# Patient Record
Sex: MALE | Race: BLACK OR AFRICAN AMERICAN | NOT HISPANIC OR LATINO | Employment: FULL TIME | URBAN - METROPOLITAN AREA
[De-identification: names, ages, dates, MRNs, and addresses within clinical notes are randomized per-mention and may not be internally consistent; named-entity substitution may affect disease eponyms.]

---

## 2020-08-22 ENCOUNTER — HOSPITAL ENCOUNTER (EMERGENCY)
Facility: CLINIC | Age: 35
Discharge: HOME | End: 2020-08-22
Attending: NURSE PRACTITIONER | Admitting: NURSE PRACTITIONER

## 2020-08-22 VITALS
WEIGHT: 190 LBS | OXYGEN SATURATION: 98 % | TEMPERATURE: 99.5 F | SYSTOLIC BLOOD PRESSURE: 134 MMHG | BODY MASS INDEX: 26.6 KG/M2 | RESPIRATION RATE: 16 BRPM | HEIGHT: 71 IN | HEART RATE: 79 BPM | DIASTOLIC BLOOD PRESSURE: 98 MMHG

## 2020-08-22 DIAGNOSIS — M54.41 RIGHT-SIDED LOW BACK PAIN WITH RIGHT-SIDED SCIATICA: ICD-10-CM

## 2020-08-22 DIAGNOSIS — M79.604 PAIN OF RIGHT LEG: ICD-10-CM

## 2020-08-22 PROCEDURE — 99283 EMERGENCY DEPT VISIT LOW MDM: CPT

## 2020-08-22 RX ORDER — PREDNISONE 20 MG/1
60 TABLET ORAL DAILY
Qty: 15 TABLET | Refills: 0 | Status: SHIPPED | OUTPATIENT
Start: 2020-08-22 | End: 2020-08-27

## 2020-08-22 ASSESSMENT — MIFFLIN-ST. JEOR: SCORE: 1823.96

## 2020-08-22 ASSESSMENT — ENCOUNTER SYMPTOMS
FLANK PAIN: 1
NUMBNESS: 1
MYALGIAS: 1
ARTHRALGIAS: 1
WEAKNESS: 1
FEVER: 0
CHILLS: 0

## 2020-08-22 NOTE — ED AVS SNAPSHOT
Emergency Department  [address] [place] [address]  Phone:  [phone]  Fax:  [phone]                                    Blanca Zee   MRN: [id number]    Department:   Emergency Department   Date of Visit:  8/22/2020           After Visit Summary Signature Page    I have received my discharge instructions, and my questions have been answered. I have discussed any challenges I see with this plan with the nurse or doctor.    ..........................................................................................................................................  Patient/Patient Representative Signature      ..........................................................................................................................................  Patient Representative Print Name and Relationship to Patient    ..................................................               ................................................  Date                                   Time    ..........................................................................................................................................  Reviewed by Signature/Title    ...................................................              ..............................................  Date                                               Time          22EPIC Rev 08/18

## 2020-08-22 NOTE — ED PROVIDER NOTES
"  History     Chief Complaint:  Leg Pain    HPI   Blanca Zee is a 35 year old male who presents with right sided hip pain radiating to his right groin and leg for the past five days. He endorses associated leg weakness and numbness in the toes. He says pain medication temporarily helps for six hours. His temperature in triage was 99.5 F but was 98.4 F upon evaluation. He denies any fever, incontinence, or chills.    Allergies:  NKDA      Medications:    The patient is not currently taking any prescribed medications.    Past Medical History:    History reviewed.  No pertinent past medical history.    Past Surgical History:    The patient does not have any pertinent past surgical history.     Family History:    History reviewed. No pertinent family history.     Social History:  Smoking status: no  Smokeless tobacco: no  Alcohol use: no  Drug use: no  Marital Status:  Single [1]     Review of Systems   Constitutional: Negative for chills and fever.   Genitourinary: Positive for flank pain.   Musculoskeletal: Positive for arthralgias and myalgias.   Neurological: Positive for weakness and numbness.   All other systems reviewed and are negative.      Physical Exam     Patient Vitals for the past 24 hrs:   BP Temp Temp src Pulse Resp SpO2 Height Weight   08/22/20 1324 (!) 134/98 99.5  F (37.5  C) Oral 79 16 98 % 1.803 m (5' 11\") 86.2 kg (190 lb)       Physical Exam  Physical Exam   Constitutional: Pt appears well-developed and well-nourished. Non toxic appearing.   Head: Head moves freely with normal range of motion.   ENT: Oropharynx is clear and moist.   Eyes: Conjunctivae pink. EOMs intact.   Neck: Normal range of motion.    Cardiovascular: Regular rate and rhythm. Normal heart sounds. No concerning murmur. Intact distal pulses: radial pulses 2+ on the right, 2+ on the left. Pedal pulses 2+ on the right, 2+ on the left.   Pulmonary/Chest: No respiratory distress.  Breath sounds normal.   Abdominal: Soft. Non-tender. " No rebound, no guarding. No CVA tenderness.  Musculoskeletal: Distal capillary refill and sensation intact. Lower extremity strength 5/5. Patellar and achilles reflexes 2+. positive straight leg raise on the right and no crossover.  No lumbar spinal erythema, edema or heat to touch. Tender to palpation over the right sciatic notch.   Neurological: Oriented to person, place, and time. No focal deficits. No saddle anesthesia.   Skin: Skin is warm.     Emergency Department Course   Emergency Department Course:  Past medical records, nursing notes, and vitals reviewed.  1351: I performed an exam of the patient and obtained history, as documented above.     I reviewed the results with the Patient and answered all related questions prior to discharge.     Findings and plan explained to the Patient. Patient discharged home with instructions regarding supportive care, medications, and reasons to return. The importance of close follow-up was reviewed.   Impression & Plan     Medical Decision Making:  Blanca Zee is a 35 year old male with right lower back pain radiating down his leg. No urinary symptoms, no CVA tenderness, this is unlikely pain related to UTI, pyelonephritis or renal colic/stone. Pt has radiating pain with right SLR, no bladder or bowel dysfunction, normal reflexes, with normal motor strength and function of lower extremities. No saddle anesthesia. Pt is afebrile and non-toxic appearing with no bony tenderness, erythema or heat to touch. These findings do not correlate with diskitis, cord compression, cauda equina syndrome or epidural abscess. Symptoms and exam consistent with right sided lumbar radiculopathy.  We discussed stress steroids, reasons to return here and follow up with [facility] if not improved. He is amenable to plan.     Diagnosis:    ICD-10-CM   1. Right-sided low back pain with right-sided sciatica  M54.41   2. Pain of right leg  M79.604     Disposition:  Discharged to home.    Discharge  Medications:  New Prescriptions    PREDNISONE (DELTASONE) 20 MG TABLET    Take 3 tablets (60 mg) by mouth daily for 5 days     Li Shaw  8/22/2020    EMERGENCY DEPARTMENT    Scribe Disclosure:  I, Li Shaw, am serving as a scribe at 1:46 PM on 8/22/2020 to document services personally performed by Jasmine Ocampo CNP based on my observations and the provider's statements to me.          Jasmine Ocampo, BRIANNA CNP  08/22/20 [id number]

## 2020-08-22 NOTE — DISCHARGE INSTRUCTIONS
Take your first dose of Prednisone 60mg (3 tabs) as soon as you get it filled today. Take your next dose tomorrow morning and once a day in the mornings. You will be on this for 5 days.

## 2020-08-23 ENCOUNTER — HOSPITAL ENCOUNTER (EMERGENCY)
Facility: CLINIC | Age: 35
Discharge: HOME | End: 2020-08-23
Attending: EMERGENCY MEDICINE | Admitting: EMERGENCY MEDICINE

## 2020-08-23 VITALS
RESPIRATION RATE: 18 BRPM | DIASTOLIC BLOOD PRESSURE: 92 MMHG | SYSTOLIC BLOOD PRESSURE: 139 MMHG | OXYGEN SATURATION: 98 % | TEMPERATURE: 97.8 F | HEART RATE: 111 BPM

## 2020-08-23 DIAGNOSIS — M54.41 ACUTE RIGHT-SIDED LOW BACK PAIN WITH RIGHT-SIDED SCIATICA: ICD-10-CM

## 2020-08-23 PROCEDURE — 99283 EMERGENCY DEPT VISIT LOW MDM: CPT

## 2020-08-23 PROCEDURE — 25000132 ZZH RX MED GY IP 250 OP 250 PS 637: Performed by: EMERGENCY MEDICINE

## 2020-08-23 RX ORDER — CYCLOBENZAPRINE HCL 10 MG
10 TABLET ORAL ONCE
Status: COMPLETED | OUTPATIENT
Start: 2020-08-23 | End: 2020-08-23

## 2020-08-23 RX ORDER — LIDOCAINE 4 G/G
1 PATCH TOPICAL ONCE
Status: DISCONTINUED | OUTPATIENT
Start: 2020-08-23 | End: 2020-08-23 | Stop reason: HOSPADM

## 2020-08-23 RX ORDER — HYDROCODONE BITARTRATE AND ACETAMINOPHEN 5; 325 MG/1; MG/1
1-2 TABLET ORAL EVERY 4 HOURS PRN
Qty: 15 TABLET | Refills: 0 | Status: SHIPPED | OUTPATIENT
Start: 2020-08-23 | End: 2021-03-01

## 2020-08-23 RX ORDER — CYCLOBENZAPRINE HCL 10 MG
10 TABLET ORAL 3 TIMES DAILY PRN
Qty: 20 TABLET | Refills: 0 | Status: SHIPPED | OUTPATIENT
Start: 2020-08-23 | End: 2020-08-30

## 2020-08-23 RX ORDER — HYDROCODONE BITARTRATE AND ACETAMINOPHEN 5; 325 MG/1; MG/1
2 TABLET ORAL ONCE
Status: COMPLETED | OUTPATIENT
Start: 2020-08-23 | End: 2020-08-23

## 2020-08-23 RX ADMIN — HYDROCODONE BITARTRATE AND ACETAMINOPHEN 2 TABLET: 5; 325 TABLET ORAL at 12:03

## 2020-08-23 RX ADMIN — LIDOCAINE 1 PATCH: 560 PATCH PERCUTANEOUS; TOPICAL; TRANSDERMAL at 12:03

## 2020-08-23 RX ADMIN — CYCLOBENZAPRINE 10 MG: 10 TABLET, FILM COATED ORAL at 12:03

## 2020-08-23 ASSESSMENT — ENCOUNTER SYMPTOMS
FEVER: 0
MYALGIAS: 1
BACK PAIN: 1

## 2020-08-23 NOTE — ED AVS SNAPSHOT
Emergency Department  [address] [place] [address]  Phone:  [phone]  Fax:  [phone]                                    Blanca Zee   MRN: [id number]    Department:   Emergency Department   Date of Visit:  8/23/2020           After Visit Summary Signature Page    I have received my discharge instructions, and my questions have been answered. I have discussed any challenges I see with this plan with the nurse or doctor.    ..........................................................................................................................................  Patient/Patient Representative Signature      ..........................................................................................................................................  Patient Representative Print Name and Relationship to Patient    ..................................................               ................................................  Date                                   Time    ..........................................................................................................................................  Reviewed by Signature/Title    ...................................................              ..............................................  Date                                               Time          22EPIC Rev 08/18

## 2020-08-23 NOTE — DISCHARGE INSTRUCTIONS
You should also use ibuprofen and Tylenol when you are not taking the Norco that you are prescribed, as that has Tylenol in it.  You should also use over-the-counter Lidoderm patches.  You are also being provided the contact information for a spine specialist at [facility] as well as the contact information for a family practice clinic.

## 2020-08-23 NOTE — ED PROVIDER NOTES
History     Chief Complaint:  Back Pain     HPI   Blanca Zee is a 35 year old male who presents with his friend/roommate to the ED for evaluation of right lower back pain. On review of patient s record, he was seen at [facility] ED yesterday for right lower back pain radiating down into his groin and leg for the past 5 days. Patient was discharged home with a 5-day course of 20mg Prednisone. Upon evaluation, the patient notes the pain has worsened. Standing and ambulating worsens the pain. He took Prednisone without alleviation of pain. He has been taking Ibuprofen and Tylenol as well. The patient denies any fever, urinary/bowel incontinence, or other symptoms/complaints.       Allergies:  No known drug allergies    Medications:    Prednisone     Past Medical History:    The patient does not have any past pertinent medical history.    Past Surgical History:    History reviewed. No pertinent surgical history.    Family History:    History reviewed. No pertinent family history.     Social History:  Smoking status: Never smoker    Substance use: Not currently   Alcohol use: Not currently  Presents to ED with friend/roommate    Marital Status:  Single [1]     Review of Systems   Constitutional: Negative for fever.   Musculoskeletal: Positive for back pain and myalgias.   All other systems reviewed and are negative.    Physical Exam     Patient Vitals for the past 24 hrs:   BP Temp Temp src Pulse Resp SpO2   08/23/20 1109 (!) 139/92 97.8  F (36.6  C) Temporal 111 18 98 %     Physical Exam  Nursing note and vitals reviewed.  Constitutional:  Oriented to person, place, and time. Cooperative. Appears uncomfortable.   HENT:   Nose:    Nose normal.   Mouth/Throat:   Mucous membranes are normal.   Eyes:    Conjunctivae normal and EOM are normal.      Pupils are equal, round, and reactive to light.   Neck:    Trachea normal.   Cardiovascular:  Normal rate, regular rhythm, normal heart sounds and normal pulses. No murmur  heard.  Pulmonary/Chest:  Effort normal and breath sounds normal.   Abdominal:   Soft. Normal appearance and bowel sounds are normal.      There is no tenderness.      There is no rebound and no CVA tenderness.   Musculoskeletal:  Extremities atraumatic x 4. Tenderness to palpation in the right low back region.  Lymphadenopathy:  No cervical adenopathy.   Neurological:   Alert and oriented to person, place, and time. Normal strength.      No cranial nerve deficit or sensory deficit. GCS eye subscore is 4. GCS verbal subscore is 5. GCS motor subscore is 6. 5/5 strength in all muscle groups of the lower extremities. DTRs equal and symmetric in the lower extremities. Subjective decreased sensation to light touch to right lower leg. Straight leg raise reproduces pain.   Skin:    Skin is intact. No rash noted.   Psychiatric:   Normal mood and affect.    Emergency Department Course     Interventions:  1203: Lidocaine 4% 1 patch Transdermal  1203: Norco 5-325mg 2 tablets PO  1203: Flexeril 10mg PO    Emergency Department Course:  Past medical records, nursing notes, and vitals reviewed.    1129: I performed an exam of the patient as documented above.     1203: Patient was provided medications as noted above.    Findings and plan explained to the patient. Patient discharged home with instructions regarding supportive care, medications, and reasons to return. The importance of close follow-up was reviewed. The patient was prescribed Norco and Flexeril.     I personally reviewed the laboratory results with the patient and answered all related questions prior to discharge.     Impression & Plan      Medical Decision Making:  This is a 35-year-old male who came in for further evaluation of ongoing and somewhat worsening radicular pain in the right low back region.  He does not have any worrisome symptoms or exam findings for cauda equina syndrome or discitis, and therefore I do not feel that he warrants emergent imaging with CT  or MRI at this time.  However he clearly is quite uncomfortable.  Therefore he was provided the above interventions here, and I will send him home with prescriptions for Flexeril and Norco as well.  I also recommended he buy some over-the-counter Lidoderm patches.  He has been provided follow-up with [facility] again, as he did not realize that was provided to him yesterday.  He should return with any concerns or worsening symptoms as well.    Diagnosis:    ICD-10-CM   1. Acute right-sided low back pain with right-sided sciatica  M54.41     Disposition: Patient discharged to home with friend/roommate     Discharge Medications:   Details   cyclobenzaprine (FLEXERIL) 10 MG tablet Take 1 tablet (10 mg) by mouth 3 times daily as needed for muscle spasms, Disp-20 tablet,R-0, Local Print      HYDROcodone-acetaminophen (NORCO) 5-325 MG tablet Take 1-2 tablets by mouth every 4 hours as needed for pain, Disp-15 tablet,R-0, Local Print     Anastacia Mccormick  8/23/2020    EMERGENCY DEPARTMENT    Scribe Disclosure:  I, Anastacia Mccormick, am serving as a scribe at 11:29 AM on 8/23/2020 to document services personally performed by Alton Nesbitt MD based on my observations and the provider's statements to me.        Alton Nesbitt MD  08/23/20 [id number]

## 2021-03-01 ENCOUNTER — OFFICE VISIT (OUTPATIENT)
Dept: FAMILY MEDICINE | Facility: CLINIC | Age: 36
End: 2021-03-01
Payer: COMMERCIAL

## 2021-03-01 VITALS
HEART RATE: 90 BPM | WEIGHT: 195 LBS | BODY MASS INDEX: 27.3 KG/M2 | SYSTOLIC BLOOD PRESSURE: 122 MMHG | TEMPERATURE: 97.2 F | DIASTOLIC BLOOD PRESSURE: 78 MMHG | OXYGEN SATURATION: 99 % | HEIGHT: 71 IN

## 2021-03-01 DIAGNOSIS — Z23 NEED FOR PROPHYLACTIC VACCINATION AND INOCULATION AGAINST INFLUENZA: ICD-10-CM

## 2021-03-01 DIAGNOSIS — R74.8 ELEVATED ALKALINE PHOSPHATASE LEVEL: ICD-10-CM

## 2021-03-01 DIAGNOSIS — Z13.220 SCREENING FOR LIPID DISORDERS: ICD-10-CM

## 2021-03-01 DIAGNOSIS — Z13.1 SCREENING FOR DIABETES MELLITUS: ICD-10-CM

## 2021-03-01 DIAGNOSIS — Z00.00 ENCOUNTER FOR ROUTINE ADULT HEALTH EXAMINATION WITHOUT ABNORMAL FINDINGS: Primary | ICD-10-CM

## 2021-03-01 DIAGNOSIS — R73.01 ELEVATED FASTING GLUCOSE: ICD-10-CM

## 2021-03-01 PROCEDURE — 90471 IMMUNIZATION ADMIN: CPT | Performed by: NURSE PRACTITIONER

## 2021-03-01 PROCEDURE — 36415 COLL VENOUS BLD VENIPUNCTURE: CPT | Performed by: NURSE PRACTITIONER

## 2021-03-01 PROCEDURE — 99385 PREV VISIT NEW AGE 18-39: CPT | Mod: 25 | Performed by: NURSE PRACTITIONER

## 2021-03-01 PROCEDURE — 90686 IIV4 VACC NO PRSV 0.5 ML IM: CPT | Performed by: NURSE PRACTITIONER

## 2021-03-01 PROCEDURE — 80053 COMPREHEN METABOLIC PANEL: CPT | Performed by: NURSE PRACTITIONER

## 2021-03-01 PROCEDURE — 80061 LIPID PANEL: CPT | Performed by: NURSE PRACTITIONER

## 2021-03-01 ASSESSMENT — MIFFLIN-ST. JEOR: SCORE: 1841.64

## 2021-03-01 NOTE — PROGRESS NOTES
SUBJECTIVE:   CC: Blanca Zee is an 36 year old male who presents for preventative health visit.       Patient has been advised of split billing requirements and indicates understanding: Yes  Healthy Habits:    Getting at least 3 servings of Calcium per day:  Yes    Bi-annual eye exam:  NO    Dental care twice a year:  Yes    Sleep apnea or symptoms of sleep apnea:  None    Diet:  Regular (no restrictions)    Frequency of exercise:  2-3 days/week    Duration of exercise:  30-45 minutes    Taking medications regularly:  Not Applicable    Barriers to taking medications:  Not applicable    Medication side effects:  Not applicable    PHQ-2 Total Score:    Additional concerns today:  No      Today's PHQ-2 Score:   PHQ-2 ( 1999 Pfizer) 3/1/2021   Q1: Little interest or pleasure in doing things 0   Q2: Feeling down, depressed or hopeless 0   PHQ-2 Score 0       Abuse: Current or Past(Physical, Sexual or Emotional)- No  Do you feel safe in your environment? Yes    Have you ever done Advance Care Planning? (For example, a Health Directive, POLST, or a discussion with a medical provider or your loved ones about your wishes): No, advance care planning information given to patient to review.  Patient declined advance care planning discussion at this time.    Social History     Tobacco Use     Smoking status: Never Smoker     Smokeless tobacco: Never Used   Substance Use Topics     Alcohol use: Not Currently     If you drink alcohol do you typically have >3 drinks per day or >7 drinks per week? No    Alcohol Use 3/1/2021   Prescreen: >3 drinks/day or >7 drinks/week? No       Last PSA: No results found for: PSA    Reviewed orders with patient. Reviewed health maintenance and updated orders accordingly - Yes  Lab work is in process    Reviewed and updated as needed this visit by clinical staff  Tobacco  Allergies  Meds  Problems  Med Hx  Surg Hx  Fam Hx          Reviewed and updated as needed this visit by  "Provider  Tobacco  Allergies  Meds  Problems  Med Hx  Surg Hx  Fam Hx             Review of Systems  CONSTITUTIONAL: NEGATIVE for fever, chills, change in weight  INTEGUMENTARY/SKIN: NEGATIVE for worrisome rashes, moles or lesions  EYES: NEGATIVE for vision changes or irritation  ENT: NEGATIVE for ear, mouth and throat problems  RESP: NEGATIVE for significant cough or SOB  CV: NEGATIVE for chest pain, palpitations or peripheral edema  GI: NEGATIVE for nausea, abdominal pain, heartburn, or change in bowel habits   male: negative for dysuria, hematuria, decreased urinary stream, erectile dysfunction, urethral discharge  MUSCULOSKELETAL: NEGATIVE for significant arthralgias or myalgia  NEURO: NEGATIVE for weakness, dizziness or paresthesias  PSYCHIATRIC: NEGATIVE for changes in mood or affect    OBJECTIVE:   /78 (BP Location: Right arm, Cuff Size: Adult Regular)   Pulse 90   Temp 97.2  F (36.2  C) (Tympanic)   Ht 1.803 m (5' 11\")   Wt 88.5 kg (195 lb)   SpO2 99%   BMI 27.20 kg/m      Physical Exam  GENERAL: healthy, alert and no distress  EYES: Eyes grossly normal to inspection, PERRL and conjunctivae and sclerae normal  HENT: ear canals and TM's normal, nose and mouth without ulcers or lesions  NECK: no adenopathy, no asymmetry, masses, or scars and thyroid normal to palpation  RESP: lungs clear to auscultation - no rales, rhonchi or wheezes  CV: regular rate and rhythm, normal S1 S2, no S3 or S4, no murmur, click or rub, no peripheral edema and peripheral pulses strong  ABDOMEN: soft, nontender, no hepatosplenomegaly, no masses and bowel sounds normal  MS: no gross musculoskeletal defects noted, no edema  SKIN: no suspicious lesions or rashes  NEURO: Normal strength and tone, mentation intact and speech normal  PSYCH: mentation appears normal, affect normal/bright    Diagnostic Test Results:  Labs reviewed in Epic  No results found for this or any previous visit (from the past 24 " "hour(s)).    ASSESSMENT/PLAN:   Blanca was seen today for physical and imm/inj.    Diagnoses and all orders for this visit:    Encounter for routine adult health examination without abnormal findings    Screening for diabetes mellitus  -     Comprehensive metabolic panel    Screening for lipid disorders  -     Lipid panel reflex to direct LDL Fasting    Need for prophylactic vaccination and inoculation against influenza  -     INFLUENZA VACCINE IM > 6 MONTHS VALENT IIV4 [46342]        Patient has been advised of split billing requirements and indicates understanding: Yes  COUNSELING:   Reviewed preventive health counseling, as reflected in patient instructions    Estimated body mass index is 27.2 kg/m  as calculated from the following:    Height as of this encounter: 1.803 m (5' 11\").    Weight as of this encounter: 88.5 kg (195 lb).     Weight management plan: Discussed healthy diet and exercise guidelines    He reports that he has never smoked. He has never used smokeless tobacco.      Counseling Resources:  ATP IV Guidelines  Pooled Cohorts Equation Calculator  FRAX Risk Assessment  ICSI Preventive Guidelines  Dietary Guidelines for Americans, 2010  [place]'s MyPlate  ASA Prophylaxis  Lung CA Screening    Kali Lima NP  [facility]  "

## 2021-03-02 ENCOUNTER — TELEPHONE (OUTPATIENT)
Dept: FAMILY MEDICINE | Facility: CLINIC | Age: 36
End: 2021-03-02

## 2021-03-02 LAB
ALBUMIN SERPL-MCNC: 4.2 G/DL (ref 3.4–5)
ALP SERPL-CCNC: 188 U/L (ref 40–150)
ALT SERPL W P-5'-P-CCNC: 22 U/L (ref 0–70)
ANION GAP SERPL CALCULATED.3IONS-SCNC: 6 MMOL/L (ref 3–14)
AST SERPL W P-5'-P-CCNC: 15 U/L (ref 0–45)
BILIRUB SERPL-MCNC: 0.5 MG/DL (ref 0.2–1.3)
BUN SERPL-MCNC: 10 MG/DL (ref 7–30)
CALCIUM SERPL-MCNC: 9.8 MG/DL (ref 8.5–10.1)
CHLORIDE SERPL-SCNC: 102 MMOL/L (ref 94–109)
CHOLEST SERPL-MCNC: 245 MG/DL
CO2 SERPL-SCNC: 28 MMOL/L (ref 20–32)
CREAT SERPL-MCNC: 0.84 MG/DL (ref 0.66–1.25)
GFR SERPL CREATININE-BSD FRML MDRD: >90 ML/MIN/{1.73_M2}
GLUCOSE SERPL-MCNC: 213 MG/DL (ref 70–99)
HDLC SERPL-MCNC: 31 MG/DL
LDLC SERPL CALC-MCNC: 142 MG/DL
NONHDLC SERPL-MCNC: 214 MG/DL
POTASSIUM SERPL-SCNC: 4 MMOL/L (ref 3.4–5.3)
PROT SERPL-MCNC: 8 G/DL (ref 6.8–8.8)
SODIUM SERPL-SCNC: 136 MMOL/L (ref 133–144)
TRIGL SERPL-MCNC: 360 MG/DL

## 2021-03-02 NOTE — TELEPHONE ENCOUNTER
Patient given result message from Kali Lima NP.  Chart states that patient does not need , but patient began asking questions about results indicating that he did not understand message.  Triage to call the patient back with [facility]  per patient request. My Woods RN

## 2021-03-02 NOTE — TELEPHONE ENCOUNTER
"----- Message from Kali Lima NP sent at 3/2/2021 12:34 PM CST -----  Please call Rios and relate the following:    I have reviewed your labs.     - It is important to know for sure if you were fasting for these labs. If not, you likely have diabetes. Your fasting blood sugar was very high. I will reorder a fasting blood sugar, as well as a hemoglobin A1c to confirm this. Please schedule a lab only visit to have these drawn. No food or drink (with the exception of clear water) for at least 10 hours before the labs. I will follow up after I see the results.     - Your triglycerides are a bit high. Lifestyle measures proven to assist with naturally reducing triglycerides include: Achieving and maintaining a healthy weight, increasing your activity level (at least 150 minutes of moderate-intensity exercise per week), and making sound dietary choices like consuming fewer alcoholic beverages (especially beer) and simple sugars / starches. This is because excess blood sugar is incorporated into triglycerides in the liver.     - Your total cholesterol and LDL (aka \"bad\") cholesterol are higher than what we would like to see. Over time, this could place you at higher risk for cardiovascular disease (\"heart disease\"), stroke, and peripheral vascular disease. We are not yet to the point at which a medication is indicated, but this warrants some attention and intervention in the form of lifestyle modifications. Lifestyle modifications proven to assist with lowering these parameters naturally include: Achieving and maintaining a healthy weight, increasing your activity level (at least 150 minutes of moderate-intensity exercise per week), making sound dietary choices (consuming less saturated fat, more fiber and whole grains, and fewer calories overall), and considering the use of an omega-3 fatty acid supplement (like fish oil).     - Your HDL (\"good\") cholesterol is a little low. Increasing your exercise level is one " way to increase this parameter.     - Your kidney function and electrolytes are normal.     - One of your liver enzymes was mildly elevated. This can occur for a number of reasons. I think we should recheck this when you come back for your other labs. I will put the order in. If it is still elevated, we can make a plan for further workup.     I will reach out once I see the results of these fasting labs.     Thanks,    BRIANNA Burgos, CNP

## 2021-03-03 NOTE — TELEPHONE ENCOUNTER
Called the patient with assistance Elizabeth  [phone].  Patient given entire result message with assistance of Elizabeth. Reminded patient of fasting lab appt next Tuesday.  My Woods RN

## 2021-03-09 ENCOUNTER — APPOINTMENT (OUTPATIENT)
Dept: INTERPRETER SERVICES | Facility: CLINIC | Age: 36
End: 2021-03-09
Payer: COMMERCIAL

## 2021-03-09 ENCOUNTER — TELEPHONE (OUTPATIENT)
Dept: FAMILY MEDICINE | Facility: CLINIC | Age: 36
End: 2021-03-09

## 2021-03-09 DIAGNOSIS — R74.8 ELEVATED ALKALINE PHOSPHATASE LEVEL: ICD-10-CM

## 2021-03-09 DIAGNOSIS — R73.01 ELEVATED FASTING GLUCOSE: ICD-10-CM

## 2021-03-09 LAB
ALBUMIN SERPL-MCNC: 4 G/DL (ref 3.4–5)
ALP SERPL-CCNC: 153 U/L (ref 40–150)
ALT SERPL W P-5'-P-CCNC: 29 U/L (ref 0–70)
ANION GAP SERPL CALCULATED.3IONS-SCNC: 3 MMOL/L (ref 3–14)
AST SERPL W P-5'-P-CCNC: 32 U/L (ref 0–45)
BILIRUB SERPL-MCNC: 0.4 MG/DL (ref 0.2–1.3)
BUN SERPL-MCNC: 7 MG/DL (ref 7–30)
CALCIUM SERPL-MCNC: 9.7 MG/DL (ref 8.5–10.1)
CHLORIDE SERPL-SCNC: 107 MMOL/L (ref 94–109)
CO2 SERPL-SCNC: 28 MMOL/L (ref 20–32)
CREAT SERPL-MCNC: 0.9 MG/DL (ref 0.66–1.25)
GFR SERPL CREATININE-BSD FRML MDRD: >90 ML/MIN/{1.73_M2}
GLUCOSE SERPL-MCNC: 162 MG/DL (ref 70–99)
HBA1C MFR BLD: 10.2 % (ref 0–5.6)
POTASSIUM SERPL-SCNC: 4.6 MMOL/L (ref 3.4–5.3)
PROT SERPL-MCNC: 7.9 G/DL (ref 6.8–8.8)
SODIUM SERPL-SCNC: 138 MMOL/L (ref 133–144)

## 2021-03-09 PROCEDURE — 80053 COMPREHEN METABOLIC PANEL: CPT | Performed by: NURSE PRACTITIONER

## 2021-03-09 PROCEDURE — 83036 HEMOGLOBIN GLYCOSYLATED A1C: CPT | Performed by: NURSE PRACTITIONER

## 2021-03-09 PROCEDURE — 36415 COLL VENOUS BLD VENIPUNCTURE: CPT | Performed by: NURSE PRACTITIONER

## 2021-03-09 NOTE — TELEPHONE ENCOUNTER
----- Message from Kali Lima NP sent at 3/9/2021 12:41 PM CST -----  Please call and let Rios know that his labs show he has pretty significant Type 2 Diabetes (A1c 10.2%). We should act fast to bring his blood sugar down. I would like him to schedule another visit, so we can discuss this in-person, as there will be a couple medications ordered and we will be setting him up with diabetes education.     Thanks,    Josiah

## 2021-03-09 NOTE — TELEPHONE ENCOUNTER
Patient given result message from Kali Lima NP. Scheduled the patient for this Thursday 3/11 for initiation of therapy diabetes. My Woods RN

## 2021-03-11 ENCOUNTER — OFFICE VISIT (OUTPATIENT)
Dept: FAMILY MEDICINE | Facility: CLINIC | Age: 36
End: 2021-03-11
Payer: COMMERCIAL

## 2021-03-11 VITALS
TEMPERATURE: 97.3 F | WEIGHT: 195 LBS | OXYGEN SATURATION: 97 % | BODY MASS INDEX: 27.2 KG/M2 | DIASTOLIC BLOOD PRESSURE: 74 MMHG | SYSTOLIC BLOOD PRESSURE: 120 MMHG | HEART RATE: 68 BPM

## 2021-03-11 DIAGNOSIS — E78.5 HYPERLIPIDEMIA LDL GOAL <70: ICD-10-CM

## 2021-03-11 DIAGNOSIS — E11.9 TYPE 2 DIABETES MELLITUS WITHOUT COMPLICATION, WITHOUT LONG-TERM CURRENT USE OF INSULIN (H): Primary | ICD-10-CM

## 2021-03-11 PROCEDURE — 99214 OFFICE O/P EST MOD 30 MIN: CPT | Performed by: NURSE PRACTITIONER

## 2021-03-11 PROCEDURE — 99207 PR FOOT EXAM NO CHARGE: CPT | Performed by: NURSE PRACTITIONER

## 2021-03-11 RX ORDER — METFORMIN HCL 500 MG
TABLET, EXTENDED RELEASE 24 HR ORAL
Qty: 325 TABLET | Refills: 1 | Status: SHIPPED | OUTPATIENT
Start: 2021-03-11 | End: 2021-09-01

## 2021-03-11 RX ORDER — FLURBIPROFEN SODIUM 0.3 MG/ML
SOLUTION/ DROPS OPHTHALMIC
Qty: 100 EACH | Refills: 3 | Status: SHIPPED | OUTPATIENT
Start: 2021-03-11 | End: 2021-10-20

## 2021-03-11 RX ORDER — GLUCOSAMINE HCL/CHONDROITIN SU 500-400 MG
CAPSULE ORAL
Qty: 100 EACH | Refills: 3 | Status: SHIPPED | OUTPATIENT
Start: 2021-03-11

## 2021-03-11 RX ORDER — ATORVASTATIN CALCIUM 10 MG/1
10 TABLET, FILM COATED ORAL DAILY
Qty: 90 TABLET | Refills: 3 | Status: SHIPPED | OUTPATIENT
Start: 2021-03-11 | End: 2022-04-20

## 2021-03-11 RX ORDER — LANCETS
EACH MISCELLANEOUS
Qty: 100 EACH | Refills: 6 | Status: SHIPPED | OUTPATIENT
Start: 2021-03-11

## 2021-03-11 RX ORDER — LISINOPRIL 5 MG/1
5 TABLET ORAL DAILY
Qty: 90 TABLET | Refills: 3 | Status: SHIPPED | OUTPATIENT
Start: 2021-03-11 | End: 2022-02-25

## 2021-03-11 NOTE — PROGRESS NOTES
Assessment & Plan     Type 2 diabetes mellitus without complication, without long-term current use of insulin (H)  Comment: Spent a great deal of time discussing the pathophysiology, natural history, and treatment of type 2 diabetes (with the help of an ). Ultimately, we decided to start long acting and insulin and metformin and have him check his fasting BG every morning. He will titrate his insulin dose by 3 units every three days until he hits his goal FBG of . He will also start seeing diabetes education and start working hard on diet and exercise measures aimed at reducing his hyperglycemia. Will also start statin (LDL high) and lisinopril. No evidence of classic complications at this point. Will have him return for lab recheck 3 months after his therapy is optimized (full-dose metformin and stable insulin dose).   - insulin glargine (LANTUS SOLOSTAR) 100 UNIT/ML pen  Dispense: 30 mL; Refill: 3  - insulin pen needle (ULTICARE MINI) 31G X 6 MM miscellaneous  Dispense: 100 each; Refill: 3  - alcohol swab prep pads  Dispense: 100 each; Refill: 3  - metFORMIN (GLUCOPHAGE-XR) 500 MG 24 hr tablet  Dispense: 325 tablet; Refill: 1  - AMBULATORY ADULT DIABETES EDUCATOR REFERRAL  - blood glucose monitoring (NO BRAND SPECIFIED) meter device kit  Dispense: 1 kit; Refill: 0  - blood glucose (NO BRAND SPECIFIED) test strip  Dispense: 100 strip; Refill: 6  - blood glucose calibration (NO BRAND SPECIFIED) solution  Dispense: 1 Bottle; Refill: 3  - thin (NO BRAND SPECIFIED) lancets  Dispense: 100 each; Refill: 6  - atorvastatin (LIPITOR) 10 MG tablet  Dispense: 90 tablet; Refill: 3  - Hemoglobin A1c  - Albumin Random Urine Quantitative with Creat Ratio  - lisinopril (ZESTRIL) 5 MG tablet  Dispense: 90 tablet; Refill: 3  - FOOT EXAM    Hyperlipidemia LDL goal <70  - atorvastatin (LIPITOR) 10 MG tablet  Dispense: 90 tablet; Refill: 3        See Patient Instructions    Return in about 3 months (around  6/11/2021) for Lab Work.    Kali Lima NP  [facility] KELLY Rios is a 36 year old who presents for the following health issues       Diabetes Follow-up  Recently diagnosed has not started treatment     How often are you checking your blood sugar? Not at all     Do you have any of these symptoms? (Select all that apply)  No numbness or tingling in feet.  No redness, sores or blisters on feet.  No complaints of excessive thirst.  No reports of blurry vision.  No significant changes to weight.      BP Readings from Last 2 Encounters:   03/11/21 120/74   03/01/21 122/78     Hemoglobin A1C (%)   Date Value   03/09/2021 10.2 (H)     LDL Cholesterol Calculated (mg/dL)   Date Value   03/01/2021 142 (H)         How many servings of fruits and vegetables do you eat daily?  0-1    On average, how many sweetened beverages do you drink each day (Examples: soda, juice, sweet tea, etc.  Do NOT count diet or artificially sweetened beverages)?   0    How many days per week do you exercise enough to make your heart beat faster? Life time 4 days per week    How many minutes a day do you exercise enough to make your heart beat faster? 30 - 60    How many days per week do you miss taking your medication? NA    HPI: Blanca presents today for follow up of labs drawn at his physical. His fasting blood sugar was found to be very high at that time. He was called and asked to return for repeat fasting BG and A1c check. These labs were drawn and corroborated the diagnosis of type 2 diabetes (A1c10.2%). Strangely, he endorses no symptoms. He is here today to discuss what these results mean and to start therapy.     Review of Systems   Constitutional, HEENT, cardiovascular, GI, , neuro, skin, endocrine systems are negative, except as otherwise noted.      Objective    /74   Pulse 68   Temp 97.3  F (36.3  C) (Tympanic)   Wt 88.5 kg (195 lb)   SpO2 97%   BMI 27.20 kg/m    Body mass index is  27.2 kg/m .  Physical Exam   GENERAL: healthy, alert and no distress  NECK: no adenopathy, no asymmetry, masses, or scars and thyroid normal to palpation  RESP: lungs clear to auscultation - no rales, rhonchi or wheezes  CV: regular rate and rhythm, normal S1 S2, no S3 or S4, no murmur, click or rub, no peripheral edema and peripheral pulses strong  NEURO: Normal strength and tone, mentation intact and speech normal  Diabetic foot exam: normal DP and PT pulses, no trophic changes or ulcerative lesions and normal sensory exam    Orders Only on 03/09/2021   Component Date Value Ref Range Status     Sodium 03/09/2021 138  133 - 144 mmol/L Final     Potassium 03/09/2021 4.6  3.4 - 5.3 mmol/L Final     Chloride 03/09/2021 107  94 - 109 mmol/L Final     Carbon Dioxide 03/09/2021 28  20 - 32 mmol/L Final     Anion Gap 03/09/2021 3  3 - 14 mmol/L Final     Glucose 03/09/2021 162* 70 - 99 mg/dL Final     Urea Nitrogen 03/09/2021 7  7 - 30 mg/dL Final     Creatinine 03/09/2021 0.90  0.66 - 1.25 mg/dL Final     GFR Estimate 03/09/2021 >90  >60 mL/min/[1.73_m2] Final    Comment: Non  GFR Calc  Starting 12/18/2018, serum creatinine based estimated GFR (eGFR) will be   calculated using the Chronic Kidney Disease Epidemiology Collaboration   (CKD-EPI) equation.       GFR Estimate If Black 03/09/2021 >90  >60 mL/min/[1.73_m2] Final    Comment:  GFR Calc  Starting 12/18/2018, serum creatinine based estimated GFR (eGFR) will be   calculated using the Chronic Kidney Disease Epidemiology Collaboration   (CKD-EPI) equation.       Calcium 03/09/2021 9.7  8.5 - 10.1 mg/dL Final     Bilirubin Total 03/09/2021 0.4  0.2 - 1.3 mg/dL Final     Albumin 03/09/2021 4.0  3.4 - 5.0 g/dL Final     Protein Total 03/09/2021 7.9  6.8 - 8.8 g/dL Final     Alkaline Phosphatase 03/09/2021 153* 40 - 150 U/L Final     ALT 03/09/2021 29  0 - 70 U/L Final     AST 03/09/2021 32  0 - 45 U/L Final     Hemoglobin A1C 03/09/2021  10.2* 0 - 5.6 % Final    Comment: Normal <5.7% Prediabetes 5.7-6.4%  Diabetes 6.5% or higher - adopted from ADA   consensus guidelines.

## 2021-03-11 NOTE — PATIENT INSTRUCTIONS
1. Schedule with diabetes educator (they will call you).    2. Start insulin injections and titrate this based on your morning blood sugar ( is goal)    3. Start metformin    4. Start atorvastatin and lisinopril    5. Return for labs in 3 months

## 2021-04-01 ENCOUNTER — PATIENT OUTREACH (OUTPATIENT)
Dept: EDUCATION SERVICES | Facility: CLINIC | Age: 36
End: 2021-04-01
Attending: NURSE PRACTITIONER
Payer: COMMERCIAL

## 2021-04-01 DIAGNOSIS — E11.9 TYPE 2 DIABETES MELLITUS WITHOUT COMPLICATION, WITHOUT LONG-TERM CURRENT USE OF INSULIN (H): ICD-10-CM

## 2021-04-01 PROCEDURE — G0108 DIAB MANAGE TRN  PER INDIV: HCPCS | Mod: 95

## 2021-04-01 NOTE — PROGRESS NOTES
"Diabetes Self-Management Education & Support  Type of Service: Telephone Visit    How would patient like to obtain AVS? Mail a copy    Presents for: Initial Assessment for new diagnosis    SUBJECTIVE/OBJECTIVE:  Presents for: Initial Assessment for new diagnosis  Accompanied by: Self  Diabetes education in the past 24mo: No  Focus of Visit: Healthy Eating, Taking Medication  Diabetes type: Type 2  Date of diagnosis: 3/9/2021  Disease course: Improving  How confident are you filling out medical forms by yourself:: Not Assessed  Transportation concerns: No  Difficulty affording diabetes medication?: No  Difficulty affording diabetes testing supplies?: No  Other concerns:: English as a second language, Language barrier  Cultural Influences/Ethnic Background:  [place]ian    Diabetes Symptoms & Complications:  Fatigue: No  Neuropathy: No  Polydipsia: No  Polyphagia: No  Polyuria: Yes  Visual change: No  Slow healing wounds: No       Patient Problem List and Family Medical History reviewed for relevant medical history, current medical status, and diabetes risk factors.    Vitals:  There were no vitals taken for this visit.  Estimated body mass index is 27.2 kg/m  as calculated from the following:    Height as of 3/1/21: 1.803 m (5' 11\").    Weight as of 3/11/21: 88.5 kg (195 lb).   Last 3 BP:   BP Readings from Last 3 Encounters:   03/11/21 120/74   03/01/21 122/78   08/23/20 (!) 139/92       History   Smoking Status     Never Smoker   Smokeless Tobacco     Never Used       Labs:  Lab Results   Component Value Date    A1C 10.2 03/09/2021     Lab Results   Component Value Date     03/09/2021     Lab Results   Component Value Date     03/01/2021     HDL Cholesterol   Date Value Ref Range Status   03/01/2021 31 (L) >39 mg/dL Final   ]  GFR Estimate   Date Value Ref Range Status   03/09/2021 >90 >60 mL/min/[1.73_m2] Final     Comment:     Non  GFR Calc  Starting 12/18/2018, serum creatinine based " estimated GFR (eGFR) will be   calculated using the Chronic Kidney Disease Epidemiology Collaboration   (CKD-EPI) equation.       GFR Estimate If Black   Date Value Ref Range Status   03/09/2021 >90 >60 mL/min/[1.73_m2] Final     Comment:      GFR Calc  Starting 12/18/2018, serum creatinine based estimated GFR (eGFR) will be   calculated using the Chronic Kidney Disease Epidemiology Collaboration   (CKD-EPI) equation.       Lab Results   Component Value Date    CR 0.90 03/09/2021     No results found for: MICROALBUMIN    Healthy Eating:  Healthy Eating Assessed Today: Yes  Cultural/Sabianism diet restrictions?: Yes(Plans to participate in Ramadan 4/13-5/13)  Meal planning/habits: Avoiding sweets  Meals include: Breakfast, Lunch, Evening Snack  Breakfast: cereal (oatmeal)cookes 1/2c est- has with milk -  Lunch: rice (3 big spoons) with fish, spinach and beets, and fruits like banana and apple blend and drink -  Dinner: sometime skips or small meal- Chips OR milk OR 1 pc bread  Beverages: Water, Tea  Has patient met with a dietitian in the past?: No    Was having injera for breakfast before    Camel meat, goat, cow, fish, chicken - ate frequently before  Beverages- once per day- any juice, tea a lot with sugar, coke and during the day-     Will celebrate Ramadan this year April 12-5/12     Currently taking 18-22 units Lantus - taking it in the morning     Being Active:  Being Active Assessed Today: No    Monitoring:  Monitoring Assessed Today: Yes  Times checking blood sugar at home (number): 2  Times checking blood sugar at home (per): Day  Blood glucose trend: Decreasing    fasting and after breakfast or lunch    Fasting 120    After 140-150 - highest has [id number]      Taking Medications:  Diabetes Medication(s)     Biguanides       metFORMIN (GLUCOPHAGE-XR) 500 MG 24 hr tablet    Take 1 tablet (500 mg) by mouth daily (with dinner) for 7 days, THEN 1 tablet (500 mg) 2 times daily (with meals) for 7  days, THEN 2 tablets (1,000 mg) 2 times daily (with meals).    Insulin       insulin glargine (LANTUS SOLOSTAR) 100 UNIT/ML pen    Inject 16 units at bedtime. Increase dose by 3 units every 3 days until morning / fasting blood sugar is . Call if needing more than 30 units.          Taking Medication Assessed Today: Yes  Current Treatments: Insulin Injections, Oral Medication (taken by mouth)  Dose schedule: Pre-breakfast  Given by: Patient  Injection/Infusion sites: Abdomen  Problems taking diabetes medications regularly?: No  Diabetes medication side effects?: No    Problem Solving:  Problem Solving Assessed Today: No  Is the patient at risk for hypoglycemia?: Yes(especially during Ramadan)    Reducing Risks:  CAD Risks: Diabetes Mellitus, Male sex    Healthy Coping:  Emotional response to diabetes: Ready to learn, Concern for health and well-being, Confidence diabetes can be controlled  Informal Support system:: Friends  Stage of change: ACTION (Actively working towards change)  Support resources: None, Websites  Patient Activation Measure Survey Score:  No flowsheet data found.    Diabetes knowledge and skills assessment:   Patient is knowledgeable in diabetes management concepts related to: Monitoring    Patient needs further education on the following diabetes management concepts: Healthy Eating, Taking Medication and Planning for Ramadan    Based on learning assessment above, most appropriate setting for further diabetes education would be: Individual setting.      INTERVENTIONS:    Education provided today on:  AADE Self-Care Behaviors:  Healthy Eating: carbohydrate counting, consistency in amount, composition, and timing of food intake, portion control, plate planning method, label reading and example meals with 60g total carbohydrate.     Monitoring: log and interpret results, individual blood glucose targets and frequency of monitoring  Taking Medication: action of prescribed medication, drawing up,  administering and storing injectable diabetes medications, side effects of prescribed medications and discussed how it will impact Ramadan.  Recommended taking Lantus at evening meal during Ramadan vs. In the morning.  Discussed risks of lows with insulin during the day.  Requested patient write down readings for 1 week then follow up to discuss estimated insulin plan for Ramadan.     Opportunities for ongoing education and support in diabetes-self management were discussed.    Pt verbalized understanding of concepts discussed and recommendations provided today.       Education Materials Provided:  BG Log Sheet, My Plate Planner and [place] Carb counting guide      ASSESSMENT:  Patient's blood sugars are lower.  He is taking 18-22 units of Lantus in the morning, which likely works well now.  On 4/13 he will start Ramadan, and fast during the day eating only AM and PM.  Patient will be at risk of low blood sugars, and with metformin doses increasing he my need less insulin.    He was previously drinking many sugared beverages and had large portions of carbohydrates at meals.  He will likely be able to reduce portions once Ramadan is over.     Patient's most recent   Lab Results   Component Value Date    A1C 10.2 03/09/2021    is not meeting goal of <7.0    PLAN  See Patient Instructions for co-developed, patient-stated behavior change goals.  AVS printed and provided to patient today. See Follow-Up section for recommended follow-up.    CDE phone call 4/9 to review blood sugars and provide insulin dose advice going into Ramadan.    Mckenzie Ramirez MS, RD, LD, CDE    Time Spent: 60 minutes  Encounter Type: Individual    Any diabetes medication dose changes were made via the CDE Protocol and Collaborative Practice Agreement with the patient's primary care provider. A copy of this encounter was shared with the provider.

## 2021-04-09 ENCOUNTER — VIRTUAL VISIT (OUTPATIENT)
Dept: EDUCATION SERVICES | Facility: CLINIC | Age: 36
End: 2021-04-09
Payer: COMMERCIAL

## 2021-04-09 DIAGNOSIS — E11.9 TYPE 2 DIABETES MELLITUS WITHOUT COMPLICATION, WITHOUT LONG-TERM CURRENT USE OF INSULIN (H): Primary | ICD-10-CM

## 2021-04-09 PROCEDURE — 98968 PH1 ASSMT&MGMT NQHP 21-30: CPT | Mod: 95

## 2021-04-09 NOTE — LETTER
4/9/2021         RE: Blanca Zee  [id number]  [place] [address]        Dear Colleague,    Thank you for referring your patient, Blanca Zee, to the [facility]. Please see a copy of my visit note below.    Diabetes Follow-up  Type of Service: Telephone Visit    How would patient like to obtain AVS?none    Subjective/Objective:    CDE called Blanca Zee to review blood sugars prior to start of Ramadan. Last date of communication was: 4/1/21.    Called out to patient with .    Diabetes is being managed with   Diabetes Medications   Diabetes Medication(s)     Biguanides       metFORMIN (GLUCOPHAGE-XR) 500 MG 24 hr tablet    Take 1 tablet (500 mg) by mouth daily (with dinner) for 7 days, THEN 1 tablet (500 mg) 2 times daily (with meals) for 7 days, THEN 2 tablets (1,000 mg) 2 times daily (with meals).    Insulin       insulin glargine (LANTUS SOLOSTAR) 100 UNIT/ML pen    Inject 16 units at bedtime. Increase dose by 3 units every 3 days until morning / fasting blood sugar is . Call if needing more than 30 units.        Patient taking 18-0-0-0    BG/Food Log:   Date Breakfast     Before After   4/5 115 140   4/6 120 138   4/7 120 140   4/8 123 143   4/9 109      One after lunch reading of 112.    Assessment:  Blood sugars in target at current insulin dose, no change.  Recommend moving injection to before dinner once Ramadan starts to prevent daytime lows.  He may need a slightly lower dose if he has afternoon lows mid day with fasting.     Fasting blood glucose: 100% in target.  After breakfast glucose: 100% in target.    Plan/Response:  Discussed moving Lantus to dinner/evening vs. Breakfast.  Explained why and how when a low is likely to occur.  Advised patient to carry his meter and low blood sugar treatment on him during Ramadan.  Advised him he may need to take 0-0-0-16 units if mid day lows continue.     Answered patients questions about  symptoms of highs and lows, if diabetes can be cured, and how to tell if his pains are diabetes or non diabetes related.    Provided CDE triage line for questions.    CDE follow up call 21    Recommend moving Lantus timin-0-0-0 --> 0-0-0-18      Mckenzie Ramirez MS, RD, LD, CDE  Total Time: 25 minutes    Any diabetes medication dose changes were made via the CDE Protocol and Collaborative Practice Agreement with the patient's primary care provider. A copy of this encounter was shared with the provider.

## 2021-04-09 NOTE — PROGRESS NOTES
Diabetes Follow-up  Type of Service: Telephone Visit    How would patient like to obtain AVS?none    Subjective/Objective:    CDE called Blanca Zee to review blood sugars prior to start of adan. Last date of communication was: 21.    Called out to patient with .    Diabetes is being managed with   Diabetes Medications   Diabetes Medication(s)     Biguanides       metFORMIN (GLUCOPHAGE-XR) 500 MG 24 hr tablet    Take 1 tablet (500 mg) by mouth daily (with dinner) for 7 days, THEN 1 tablet (500 mg) 2 times daily (with meals) for 7 days, THEN 2 tablets (1,000 mg) 2 times daily (with meals).    Insulin       insulin glargine (LANTUS SOLOSTAR) 100 UNIT/ML pen    Inject 16 units at bedtime. Increase dose by 3 units every 3 days until morning / fasting blood sugar is . Call if needing more than 30 units.        Patient taking 18-0-0-0    BG/Food Log:   Date Breakfast     Before After    115 140   4/6 120 138   /7 120 140   /8 123 143   /9 109      One after lunch reading of 112.    Assessment:  Blood sugars in target at current insulin dose, no change.  Recommend moving injection to before dinner once Ramadan starts to prevent daytime lows.  He may need a slightly lower dose if he has afternoon lows mid day with fasting.     Fasting blood glucose: 100% in target.  After breakfast glucose: 100% in target.    Plan/Response:  Discussed moving Lantus to dinner/evening vs. Breakfast.  Explained why and how when a low is likely to occur.  Advised patient to carry his meter and low blood sugar treatment on him during Ramadan.  Advised him he may need to take 0-0-0-16 units if mid day lows continue.     Answered patients questions about symptoms of highs and lows, if diabetes can be cured, and how to tell if his pains are diabetes or non diabetes related.    Provided CDE triage line for questions.    CDE follow up call 21    Recommend moving Lantus timin-0-0-0 --> 0-0-0-18      Mckenzie  James MCDANIEL, RD, LD, CDE  Total Time: 25 minutes    Any diabetes medication dose changes were made via the CDE Protocol and Collaborative Practice Agreement with the patient's primary care provider. A copy of this encounter was shared with the provider.

## 2021-06-23 ENCOUNTER — VIRTUAL VISIT (OUTPATIENT)
Dept: EDUCATION SERVICES | Facility: CLINIC | Age: 36
End: 2021-06-23
Payer: COMMERCIAL

## 2021-06-23 DIAGNOSIS — E11.9 TYPE 2 DIABETES MELLITUS WITHOUT COMPLICATION, WITHOUT LONG-TERM CURRENT USE OF INSULIN (H): Primary | ICD-10-CM

## 2021-06-23 PROCEDURE — G0108 DIAB MANAGE TRN  PER INDIV: HCPCS | Mod: 95

## 2021-06-23 NOTE — PROGRESS NOTES
[id number]    Reports he is doing well.    When I drink a lot of water then it goes low    Taking diabetes medications?   yes:     Diabetes Medication(s)     Biguanides       metFORMIN (GLUCOPHAGE-XR) 500 MG 24 hr tablet    Take 1 tablet (500 mg) by mouth daily (with dinner) for 7 days, THEN 1 tablet (500 mg) 2 times daily (with meals) for 7 days, THEN 2 tablets (1,000 mg) 2 times daily (with meals).    Insulin       insulin glargine (LANTUS SOLOSTAR) 100 UNIT/ML pen    Inject 16 units at bedtime. Increase dose by 3 units every 3 days until morning / fasting blood sugar is . Call if needing more than 30 units.        3-4    Fasting- 120 130 lowest 111  Other tests during the day- before meals usually- 125-127- then if I eat food and check after- 97-98- onetime it was 87 - when I feel it is getting low then I eat something sweet     Wt Readings from Last 10 Encounters:   03/11/21 88.5 kg (195 lb)   03/01/21 88.5 kg (195 lb)   08/22/20 86.2 kg (190 lb)   02/03/11 82.6 kg (182 lb)   05/28/10 80.7 kg (178 lb)   05/26/10 81.2 kg (179 lb)   05/21/10 81.6 kg (179 lb 12.8 oz)   04/13/10 80.3 kg (177 lb)

## 2021-06-23 NOTE — PROGRESS NOTES
"  Used  [address]    Type of Service: Telephone Visit    How would patient like to obtain AVS? Jong    Diabetes Self-Management Education & Support    Presents for: Follow-up    SUBJECTIVE/OBJECTIVE:  Presents for: Follow-up  Accompanied by: Self  Diabetes education in the past 24mo: Yes  Focus of Visit: Monitoring, Taking Medication  Diabetes type: Type 2  Date of diagnosis: 2020  Disease course: Improving  How confident are you filling out medical forms by yourself:: Not Assessed  Diabetes management related comments/concerns: I stopped the insulin because I was getting low  Transportation concerns: No  Difficulty affording diabetes medication?: No  Difficulty affording diabetes testing supplies?: No  Other concerns:: Language barrier  Cultural Influences/Ethnic Background:  [place]    Diabetes Symptoms & Complications:  Fatigue: No  Neuropathy: No  Polydipsia: No  Polyphagia: No  Polyuria: No  Visual change: No  Slow healing wounds: No       Patient Problem List and Family Medical History reviewed for relevant medical history, current medical status, and diabetes risk factors.    Vitals:  There were no vitals taken for this visit.  Estimated body mass index is 27.2 kg/m  as calculated from the following:    Height as of 3/1/21: 1.803 m (5' 11\").    Weight as of 3/11/21: 88.5 kg (195 lb).   Last 3 BP:   BP Readings from Last 3 Encounters:   03/11/21 120/74   03/01/21 122/78   08/23/20 (!) 139/92       History   Smoking Status     Never Smoker   Smokeless Tobacco     Never Used       Labs:  Lab Results   Component Value Date    A1C 10.2 03/09/2021     Lab Results   Component Value Date     03/09/2021     Lab Results   Component Value Date     03/01/2021     HDL Cholesterol   Date Value Ref Range Status   03/01/2021 31 (L) >39 mg/dL Final   ]  GFR Estimate   Date Value Ref Range Status   03/09/2021 >90 >60 mL/min/[1.73_m2] Final     Comment:     Non  GFR Calc  Starting " 12/18/2018, serum creatinine based estimated GFR (eGFR) will be   calculated using the Chronic Kidney Disease Epidemiology Collaboration   (CKD-EPI) equation.       GFR Estimate If Black   Date Value Ref Range Status   03/09/2021 >90 >60 mL/min/[1.73_m2] Final     Comment:      GFR Calc  Starting 12/18/2018, serum creatinine based estimated GFR (eGFR) will be   calculated using the Chronic Kidney Disease Epidemiology Collaboration   (CKD-EPI) equation.       Lab Results   Component Value Date    CR 0.90 03/09/2021     No results found for: MICROALBUMIN    Healthy Eating:  Healthy Eating Assessed Today: Yes  Cultural/Confucianism diet restrictions?: No  Meal planning/habits: Smaller portions, Low carb, Avoiding sweets  Meals include: Breakfast, Lunch, Dinner  Beverages: Water  Has patient met with a dietitian in the past?: Yes    Being Active:  Being Active Assessed Today: No    Monitoring:  Monitoring Assessed Today: Yes  Did patient bring glucose meter to appointment? : No  Times checking blood sugar at home (number): 2  Times checking blood sugar at home (per): Day  Blood glucose trend: Decreasing    Testing 3-4 times per day.     Fasting- 120- 130 lowest 111  Other tests during the day- before meals usually- 125-127- then if I eat food and check after- 97-98- onetime it was 87 - when I feel it is getting low then I eat something sweet     Taking Medications:  Diabetes Medication(s)     Biguanides       metFORMIN (GLUCOPHAGE-XR) 500 MG 24 hr tablet    Take 1 tablet (500 mg) by mouth daily (with dinner) for 7 days, THEN 1 tablet (500 mg) 2 times daily (with meals) for 7 days, THEN 2 tablets (1,000 mg) 2 times daily (with meals).    Insulin       insulin glargine (LANTUS SOLOSTAR) 100 UNIT/ML pen    Inject 16 units at bedtime. Increase dose by 3 units every 3 days until morning / fasting blood sugar is . Call if needing more than 30 units.    Has stopped insulin.          Taking Medication  Assessed Today: Yes  Current Treatments: Oral Medication (taken by mouth)  Problems taking diabetes medications regularly?: No  Diabetes medication side effects?: Yes(was feeling low with insulin)    Problem Solving:  Problem Solving Assessed Today: Yes  Is the patient at risk for hypoglycemia?: No(now that he has stopped insulin)    Reducing Risks:  Has dilated eye exam at least once a year?: No  Sees dentist every 6 months?: No  Feet checked by healthcare provider in the last year?: No    Healthy Coping:  Emotional response to diabetes: Ready to learn  Informal Support system:: Family  Stage of change: MAINTENANCE (Working to maintain change, with risk of relapse)  Support resources: Websites  Patient Activation Measure Survey Score:  No flowsheet data found.    Diabetes knowledge and skills assessment:   Patient is knowledgeable in diabetes management concepts related to: Healthy Eating and Being Active    Patient needs further education on the following diabetes management concepts: Monitoring, Taking Medication and Reducing Risks    Based on learning assessment above, most appropriate setting for further diabetes education would be: Individual setting.      INTERVENTIONS:    Education provided today on:  AADE Self-Care Behaviors:  Diabetes Pathophysiology  Healthy Eating: consistency in amount, composition, and timing of food intake and trying to drink electrolyte beverages OR have some salty food when weather is hot vs just drinking water.  Praised patient for food changes so far lifestyle changes are sustainable.    Monitoring: log and interpret results, individual blood glucose targets, frequency of monitoring and discussed decreased risk of lows now that he is off insulin.     Taking Medication: action of prescribed medication and confirmed stopping insulin was excellent choice.     Reducing Risks: major complications of diabetes, prevention, early diagnostic measures and treatment of complications, foot  care, appropriate dental care, annual eye exam, aspirin therapy and A1C - goals, relating to blood glucose levels, how often to check    Opportunities for ongoing education and support in diabetes-self management were discussed.    Pt verbalized understanding of concepts discussed and recommendations provided today.       Education Materials Provided:  No new materials provided today      ASSESSMENT:  Reports he is doing well, he sometimes will feel low when he drinks too much water.  Stopped the insulin (3 weeks ago)- due to feeling shaky and was feeling weak-still taking max metformin.  Reports really changing lifestyle and eating much healthier, and eliminated sweet beverages.  Current blood sugars are within target range    He is due for labs and pcp follow up.      Patient's most recent   Lab Results   Component Value Date    A1C 10.2 03/09/2021    is not meeting goal of <7.0    PLAN  See Patient Instructions for co-developed, patient-stated behavior change goals.  AVS printed and provided to patient today. See Follow-Up section for recommended follow-up.    Continue testing blood sugars and taking metformin.    Patient is to call to schedule lab appointment and follow up with PCP.    Recommended eye and dental exam.  Encouraged brushing and flossing daily.    Mckenzie Ramirez MS, RD, LD, CDE  Time Spent: 38 minutes  Encounter Type: Individual    Any diabetes medication dose changes were made via the CDE Protocol and Collaborative Practice Agreement with the patient's primary care provider. A copy of this encounter was shared with the provider.

## 2021-07-22 ENCOUNTER — TRANSFERRED RECORDS (OUTPATIENT)
Dept: HEALTH INFORMATION MANAGEMENT | Facility: CLINIC | Age: 36
End: 2021-07-22

## 2021-07-22 LAB — RETINOPATHY: NEGATIVE

## 2021-07-27 ENCOUNTER — LAB (OUTPATIENT)
Dept: LAB | Facility: CLINIC | Age: 36
End: 2021-07-27
Payer: COMMERCIAL

## 2021-07-27 DIAGNOSIS — E11.9 TYPE 2 DIABETES MELLITUS WITHOUT COMPLICATION, WITHOUT LONG-TERM CURRENT USE OF INSULIN (H): ICD-10-CM

## 2021-07-27 LAB — HBA1C MFR BLD: 7 % (ref 0–5.6)

## 2021-07-27 PROCEDURE — 36415 COLL VENOUS BLD VENIPUNCTURE: CPT

## 2021-07-27 PROCEDURE — 82043 UR ALBUMIN QUANTITATIVE: CPT

## 2021-07-27 PROCEDURE — 83036 HEMOGLOBIN GLYCOSYLATED A1C: CPT

## 2021-07-28 ENCOUNTER — TELEPHONE (OUTPATIENT)
Dept: FAMILY MEDICINE | Facility: CLINIC | Age: 36
End: 2021-07-28

## 2021-07-28 LAB
CREAT UR-MCNC: 44 MG/DL
MICROALBUMIN UR-MCNC: <5 MG/DL
MICROALBUMIN/CREAT UR: NORMAL MG/G{CREAT}

## 2021-07-28 NOTE — TELEPHONE ENCOUNTER
Called patient with [facility] .     Left non detailed voice message for pt to call back and speak with RN.   Gabi Cheatham RN

## 2021-07-28 NOTE — TELEPHONE ENCOUNTER
Call back from patient. Connected to [facility]  (ID# [id number]). Relayed results below. He verbalizes understanding.    He questions when he should follow up for office visit and/or labs? Routing to provider to review/advise.

## 2021-07-28 NOTE — TELEPHONE ENCOUNTER
----- Message from Kali Lima NP sent at 7/28/2021  2:37 PM CDT -----  Please call Rios and let him know that his A1c came down beautifully with his diet and exercise changes and his metformin regimen (I see that he stopped his long acting insulin awhile back). At this point, he is still a little above where we would want him to be as far as A1c, so let's keep everything the same (1000 mg metformin twice a day).     Thanks,    Josiah

## 2021-07-29 NOTE — TELEPHONE ENCOUNTER
Please call Rios back and let him know that we can see him in the office in 3 months, and we can check his A1c again at that time. I will refill his metformin (2 tablets twice a day). Continue seeing diabetes education as before.    Thanks,    Josiah

## 2021-08-30 DIAGNOSIS — E11.9 TYPE 2 DIABETES MELLITUS WITHOUT COMPLICATION, WITHOUT LONG-TERM CURRENT USE OF INSULIN (H): ICD-10-CM

## 2021-09-01 RX ORDER — METFORMIN HCL 500 MG
TABLET, EXTENDED RELEASE 24 HR ORAL
Qty: 360 TABLET | Refills: 0 | Status: SHIPPED | OUTPATIENT
Start: 2021-09-01 | End: 2022-01-31

## 2021-10-20 ENCOUNTER — OFFICE VISIT (OUTPATIENT)
Dept: FAMILY MEDICINE | Facility: CLINIC | Age: 36
End: 2021-10-20
Payer: COMMERCIAL

## 2021-10-20 VITALS
BODY MASS INDEX: 26.46 KG/M2 | RESPIRATION RATE: 10 BRPM | WEIGHT: 189 LBS | SYSTOLIC BLOOD PRESSURE: 132 MMHG | TEMPERATURE: 98.1 F | DIASTOLIC BLOOD PRESSURE: 84 MMHG | HEIGHT: 71 IN | HEART RATE: 72 BPM | OXYGEN SATURATION: 100 %

## 2021-10-20 DIAGNOSIS — Z11.59 NEED FOR HEPATITIS C SCREENING TEST: ICD-10-CM

## 2021-10-20 DIAGNOSIS — Z23 NEED FOR VACCINATION: ICD-10-CM

## 2021-10-20 DIAGNOSIS — Z23 NEED FOR PROPHYLACTIC VACCINATION AND INOCULATION AGAINST INFLUENZA: ICD-10-CM

## 2021-10-20 DIAGNOSIS — Z11.4 SCREENING FOR HIV (HUMAN IMMUNODEFICIENCY VIRUS): ICD-10-CM

## 2021-10-20 DIAGNOSIS — K14.5 TONGUE, FISSURED: ICD-10-CM

## 2021-10-20 DIAGNOSIS — E11.9 TYPE 2 DIABETES MELLITUS WITHOUT COMPLICATION, WITHOUT LONG-TERM CURRENT USE OF INSULIN (H): Primary | ICD-10-CM

## 2021-10-20 LAB
FOLATE SERPL-MCNC: 11.4 NG/ML
HBA1C MFR BLD: 7.3 % (ref 0–5.6)
HCV AB SERPL QL IA: NONREACTIVE
HIV 1+2 AB+HIV1 P24 AG SERPL QL IA: NONREACTIVE
VIT B12 SERPL-MCNC: 363 PG/ML (ref 193–986)

## 2021-10-20 PROCEDURE — 36415 COLL VENOUS BLD VENIPUNCTURE: CPT | Performed by: INTERNAL MEDICINE

## 2021-10-20 PROCEDURE — 86803 HEPATITIS C AB TEST: CPT | Performed by: INTERNAL MEDICINE

## 2021-10-20 PROCEDURE — 90471 IMMUNIZATION ADMIN: CPT | Performed by: INTERNAL MEDICINE

## 2021-10-20 PROCEDURE — 90686 IIV4 VACC NO PRSV 0.5 ML IM: CPT | Performed by: INTERNAL MEDICINE

## 2021-10-20 PROCEDURE — 90732 PPSV23 VACC 2 YRS+ SUBQ/IM: CPT | Performed by: INTERNAL MEDICINE

## 2021-10-20 PROCEDURE — 83036 HEMOGLOBIN GLYCOSYLATED A1C: CPT | Performed by: INTERNAL MEDICINE

## 2021-10-20 PROCEDURE — 82746 ASSAY OF FOLIC ACID SERUM: CPT | Performed by: INTERNAL MEDICINE

## 2021-10-20 PROCEDURE — 87389 HIV-1 AG W/HIV-1&-2 AB AG IA: CPT | Performed by: INTERNAL MEDICINE

## 2021-10-20 PROCEDURE — 90714 TD VACC NO PRESV 7 YRS+ IM: CPT | Performed by: INTERNAL MEDICINE

## 2021-10-20 PROCEDURE — 90472 IMMUNIZATION ADMIN EACH ADD: CPT | Performed by: INTERNAL MEDICINE

## 2021-10-20 PROCEDURE — 99214 OFFICE O/P EST MOD 30 MIN: CPT | Mod: 25 | Performed by: INTERNAL MEDICINE

## 2021-10-20 PROCEDURE — 82607 VITAMIN B-12: CPT | Performed by: INTERNAL MEDICINE

## 2021-10-20 ASSESSMENT — MIFFLIN-ST. JEOR: SCORE: 1814.43

## 2021-10-20 ASSESSMENT — PAIN SCALES - GENERAL: PAINLEVEL: NO PAIN (0)

## 2021-10-20 NOTE — PATIENT INSTRUCTIONS
"As discussed, will check your labs and do further recommendations.     ==================    American Diabetes Association (ADA) nutritional guidelines, which do not give specific total dietary compositional targets except for the following recommendations [1] that are in large part similar to the recommendations for the general population (see \"Healthy diet in adults\"):  ?A diet that includes carbohydrates from fruits, vegetables, whole grains, legumes, and low-fat milk is encouraged.  The ideal amount of carbohydrate intake is uncertain. However, monitoring carbohydrate intake (carbohydrate counting or experience-based estimation) is important in patients with diabetes, as carbohydrate intake directly determines postprandial blood glucose, and appropriate insulin adjustment for identified quantities of carbohydrate is one of the most important factors that can improve glycemic control.  ?A variety of eating patterns (Mediterranean, low fat, low carbohydrate, vegetarian) are acceptable.  ?Fat quality is more important than fat quantity. Trans fats contribute to coronary heart disease, while mono- and polyunsaturated fats (eg, those found in fish, olive oil, nuts) are relatively protective. Saturated fatty acids and different food sources of saturated fat have divergent effects on cardiovascular and metabolic health. Trans fatty acid consumption should be kept as low as possible.  ?Protein intake goals should be individualized but not lower than 0.8 g/kg body weight per day (the recommended daily allowance). Patients should be encouraged to substitute lean meats, fish, eggs, beans, peas, soy products, and nuts and seeds for red meat.  An automatic reduction of dietary protein intake (eg, 15 to 19 percent of calories) below usual protein intake in patients who develop diabetic kidney disease is not recommended. The role of dietary protein restriction is uncertain, particularly in view of problems with compliance in " patients already being treated with saturated fat and simple carbohydrate restriction. Furthermore, it is uncertain if a low-protein diet is significantly additive to other measures aimed at reducing cardiovascular risk and preserving renal function, such as angiotensin-converting enzyme (ACE) inhibition and aggressive control of blood pressure and blood glucose.  The usual daily intake of protein should be approximately 10 to 20 percent of total caloric intake. Higher levels of dietary protein intake (>20 percent of calories from protein or >1.3 g/kg/day) have been associated with increased albuminuria, more rapid kidney function loss, and cardiovascular disease (CVD) mortality and therefore should be avoided [69].  ?Fiber intake should be at least 14 grams per 1000 calories daily; higher fiber intake may improve glycemic control.  ?A reduced sodium intake of 2300 mg per day with a diet high in fruits, vegetables, and low-fat dairy products is prudent and has demonstrated beneficial effects on blood pressure.  ?Sugar-sweetened beverages should be avoided in order to control glycemia, weight, and reduce risk for CVD and fatty liver. Consumption of foods with added sugar that have the capacity to displace healthier, more nutrient-dense food choices should be minimized. Care should be taken to avoid excess calories from sucrose; however, foods containing sucrose may be substituted for other carbohydrates or covered with insulin or insulin secretagogue medications.  ?Sugar alcohols and non-nutritive sweeteners are safe when consumed within daily levels established by the US Food and Drug Administration (FDA). When calculating carbohydrate content of foods, one-half of the sugar alcohol content can be counted in the total carbohydrate content of the food. Use of sugar alcohols needs to be balanced with their potential to cause gastrointestinal side effects in sensitive individuals.        AVOID THESE FOODS WITH HIGH  GLYCEMIC INDEX      Dietary glycemic indices and glycemic load for the top 20 carbohydrate-contributing foods in the Nurses' Health Study in 1984  Foods Glycemic index*, % Carbohydrate per serving, g Glycemic load per serving   1. Cooked potatoes (mashed or baked)  102 37 38   2. White bread 100 13 13   3. Cold breakfast cereal Varies by cereal Varies by cereal Varies by cereal   4. Dark bread 102 12 12   5. Orange juice 75 20 15   6. Banana 88 27 24   7. White rice 102 45 46   8. Pizza 86 78 68   9. Pasta 71 40 28   10. English muffins 84 26 22   11. Fruit punch 95 44 42   12. Cola 90 39 35   13. Apple 55 21 12   14. Skim milk 46 11 5   15. Pancake 119 56 67   16. Table sugar 84 4 3   17. Jam 91 13 12   18. Cranberry juice 105 19 20   19. French fries 95 35 33   20. Candy 99 28 28

## 2021-10-20 NOTE — PROGRESS NOTES
"  Assessment and Plan  1. Type 2 diabetes mellitus without complication, without long-term current use of insulin (H)  Improving, Last A1C in 7/2021 at 7% improved from 3/2021 at 10.2. Currently on medications Metformin XR 1000 mg BID as seen in 3/2021. Due for HM . Pt checks  to 154 which was highest averaging 130's .Previously on Insulin Lantus 16 units which patient has not been using since 5/2021 due to hypoglycemia and diabetic education after which he is only on Metformin. ( he does endorses that he was currently taking only once daily sometimes when his BG remaining low on some days )   - HEMOGLOBIN A1C; Future    2. Tongue, fissured  New problem, no pain or tenderness . Will check Vitamin levels and treat if abnormal. To take OTC Multivitamin emphasized.   - Vitamin B12; Future  - Folate; Future    3. Screening for HIV (human immunodeficiency virus)  - HIV Antigen Antibody Combo; Future    4. Need for hepatitis C screening test  - Hepatitis C Screen Reflex to HCV RNA Quant and Genotype; Future    5. Need for prophylactic vaccination and inoculation against influenza  - INFLUENZA VACCINE IM > 6 MONTHS VALENT IIV4 (AFLURIA/FLUZONE)    6. Need for vaccination  - Pneumococcal vaccine 23 valent PPSV23  (Pneumovax) [49494]  - TD PRESERV FREE, IM (7+ YRS) (DECAVAC/TENIVAC)       Patient Instructions     As discussed, will check your labs and do further recommendations.     ==================    American Diabetes Association (ADA) nutritional guidelines, which do not give specific total dietary compositional targets except for the following recommendations [1] that are in large part similar to the recommendations for the general population (see \"Healthy diet in adults\"):  ?A diet that includes carbohydrates from fruits, vegetables, whole grains, legumes, and low-fat milk is encouraged.  The ideal amount of carbohydrate intake is uncertain. However, monitoring carbohydrate intake (carbohydrate counting or " experience-based estimation) is important in patients with diabetes, as carbohydrate intake directly determines postprandial blood glucose, and appropriate insulin adjustment for identified quantities of carbohydrate is one of the most important factors that can improve glycemic control.  ?A variety of eating patterns (Mediterranean, low fat, low carbohydrate, vegetarian) are acceptable.  ?Fat quality is more important than fat quantity. Trans fats contribute to coronary heart disease, while mono- and polyunsaturated fats (eg, those found in fish, olive oil, nuts) are relatively protective. Saturated fatty acids and different food sources of saturated fat have divergent effects on cardiovascular and metabolic health. Trans fatty acid consumption should be kept as low as possible.  ?Protein intake goals should be individualized but not lower than 0.8 g/kg body weight per day (the recommended daily allowance). Patients should be encouraged to substitute lean meats, fish, eggs, beans, peas, soy products, and nuts and seeds for red meat.  An automatic reduction of dietary protein intake (eg, 15 to 19 percent of calories) below usual protein intake in patients who develop diabetic kidney disease is not recommended. The role of dietary protein restriction is uncertain, particularly in view of problems with compliance in patients already being treated with saturated fat and simple carbohydrate restriction. Furthermore, it is uncertain if a low-protein diet is significantly additive to other measures aimed at reducing cardiovascular risk and preserving renal function, such as angiotensin-converting enzyme (ACE) inhibition and aggressive control of blood pressure and blood glucose.  The usual daily intake of protein should be approximately 10 to 20 percent of total caloric intake. Higher levels of dietary protein intake (>20 percent of calories from protein or >1.3 g/kg/day) have been associated with increased albuminuria,  more rapid kidney function loss, and cardiovascular disease (CVD) mortality and therefore should be avoided [69].  ?Fiber intake should be at least 14 grams per 1000 calories daily; higher fiber intake may improve glycemic control.  ?A reduced sodium intake of 2300 mg per day with a diet high in fruits, vegetables, and low-fat dairy products is prudent and has demonstrated beneficial effects on blood pressure.  ?Sugar-sweetened beverages should be avoided in order to control glycemia, weight, and reduce risk for CVD and fatty liver. Consumption of foods with added sugar that have the capacity to displace healthier, more nutrient-dense food choices should be minimized. Care should be taken to avoid excess calories from sucrose; however, foods containing sucrose may be substituted for other carbohydrates or covered with insulin or insulin secretagogue medications.  ?Sugar alcohols and non-nutritive sweeteners are safe when consumed within daily levels established by the US Food and Drug Administration (FDA). When calculating carbohydrate content of foods, one-half of the sugar alcohol content can be counted in the total carbohydrate content of the food. Use of sugar alcohols needs to be balanced with their potential to cause gastrointestinal side effects in sensitive individuals.        AVOID THESE FOODS WITH HIGH GLYCEMIC INDEX      Dietary glycemic indices and glycemic load for the top 20 carbohydrate-contributing foods in the Nurses' Health Study in 1984  Foods Glycemic index*, % Carbohydrate per serving, g Glycemic load per serving   1. Cooked potatoes (mashed or baked)  102 37 38   2. White bread 100 13 13   3. Cold breakfast cereal Varies by cereal Varies by cereal Varies by cereal   4. Dark bread 102 12 12   5. Orange juice 75 20 15   6. Banana 88 27 24   7. White rice 102 45 46   8. Pizza 86 78 68   9. Pasta 71 40 28   10. English muffins 84 26 22   11. Fruit punch 95 44 42   12. Cola 90 39 35   13. Apple 55  21 12   14. Skim milk 46 11 5   15. Pancake 119 56 67   16. Table sugar 84 4 3   17. Jam 91 13 12   18. Cranberry juice 105 19 20   19. French fries 95 35 33   20. Candy 99 28 28           Return in about 3 months (around 1/20/2022), or if symptoms worsen or fail to improve, for diabetes.    Melissa Lam MD  [facility] KELLY Rios is a 36 year old who presents for the following health issues       HPI     Diabetes Follow-up      How often are you checking your blood sugar? 2-3 times daily before meals    What concerns do you have today about your diabetes? None     Do you have any of these symptoms? (Select all that apply)  No numbness or tingling in feet.  No redness, sores or blisters on feet.  No complaints of excessive thirst.  No reports of blurry vision.  No significant changes to weight.    Patient wanted to note there is a sore like feeling on center of tongue.       BP Readings from Last 2 Encounters:   10/20/21 132/84   03/11/21 120/74     Hemoglobin A1C (%)   Date Value   07/27/2021 7.0 (H)   03/09/2021 10.2 (H)     LDL Cholesterol Calculated (mg/dL)   Date Value   03/01/2021 142 (H)                 How many servings of fruits and vegetables do you eat daily?  2-3    On average, how many sweetened beverages do you drink each day (Examples: soda, juice, sweet tea, etc.  Do NOT count diet or artificially sweetened beverages)?   0    How many days per week do you exercise enough to make your heart beat faster? 3-4 times     How many minutes a day do you exercise enough to make your heart beat faster? 1 hr    How many days per week do you miss taking your medication? 0      Pt is new to me, last seen PCP Clarence in 3/2021 for diabetes follow up.     No Known Allergies     History reviewed. No pertinent past medical history.    History reviewed. No pertinent surgical history.    History reviewed. No pertinent family history.    Social History     Tobacco Use      "Smoking status: Never Smoker     Smokeless tobacco: Never Used   Substance Use Topics     Alcohol use: Not Currently        Current Outpatient Medications   Medication     alcohol swab prep pads     atorvastatin (LIPITOR) 10 MG tablet     blood glucose (NO BRAND SPECIFIED) test strip     blood glucose calibration (NO BRAND SPECIFIED) solution     blood glucose monitoring (NO BRAND SPECIFIED) meter device kit     lisinopril (ZESTRIL) 5 MG tablet     metFORMIN (GLUCOPHAGE-XR) 500 MG 24 hr tablet     thin (NO BRAND SPECIFIED) lancets     No current facility-administered medications for this visit.        Review of Systems   Constitutional, HEENT, cardiovascular, pulmonary, GI, , musculoskeletal, neuro, skin, endocrine and psych systems are negative, except as otherwise noted.      Objective    /84   Pulse 72   Temp 98.1  F (36.7  C) (Tympanic)   Resp 10   Ht 1.803 m (5' 11\")   Wt 85.7 kg (189 lb)   SpO2 100%   BMI 26.36 kg/m    Body mass index is 26.36 kg/m .  Physical Exam   GENERAL: healthy, alert and no distress  NECK: no adenopathy, no asymmetry, masses, or scars and thyroid normal to palpation  RESP: lungs clear to auscultation - no rales, rhonchi or wheezes  CV: regular rate and rhythm, normal S1 S2, no S3 or S4, no murmur, click or rub, no peripheral edema and peripheral pulses strong  ABDOMEN: soft, nontender, no hepatosplenomegaly, no masses and bowel sounds normal  MS: no gross musculoskeletal defects noted, no edema      "

## 2021-10-21 ENCOUNTER — TELEPHONE (OUTPATIENT)
Dept: FAMILY MEDICINE | Facility: CLINIC | Age: 36
End: 2021-10-21

## 2021-10-21 ENCOUNTER — APPOINTMENT (OUTPATIENT)
Dept: INTERPRETER SERVICES | Facility: CLINIC | Age: 36
End: 2021-10-21
Payer: COMMERCIAL

## 2021-12-10 NOTE — TELEPHONE ENCOUNTER
----- Message from Melissa Lam MD sent at 10/20/2021 10:42 PM CDT -----  Your A1C has mildly worsened compared to the past. Please make sure to take Metformin twice a day as prescribed instead of once daily which you are taking sometimes. Will recheck in 3 months again and add new medication if its still abnormal at that time.     All your other labs normal, you may see some highlighted which do not have Clinical significance.     Please let me know if you have any questions.  Melissa Lam MD on 10/20/2021 at 10:36 PM   
Called the patient with assistance of [facility] .  Patient given result message from Dr. Lam.  Patient states that he has cracks in his tongue. Patient does not have any pain.    Patient is asking if Dr. Lam ordered any vitamins? Informed that none ordered.   My Woods RN        
spontaneous

## 2022-02-25 DIAGNOSIS — E11.9 TYPE 2 DIABETES MELLITUS WITHOUT COMPLICATION, WITHOUT LONG-TERM CURRENT USE OF INSULIN (H): ICD-10-CM

## 2022-02-25 RX ORDER — LISINOPRIL 5 MG/1
5 TABLET ORAL DAILY
Qty: 90 TABLET | Refills: 2 | Status: SHIPPED | OUTPATIENT
Start: 2022-02-25 | End: 2023-02-14

## 2022-03-21 DIAGNOSIS — E11.9 TYPE 2 DIABETES MELLITUS WITHOUT COMPLICATION, WITHOUT LONG-TERM CURRENT USE OF INSULIN (H): ICD-10-CM

## 2022-03-23 NOTE — TELEPHONE ENCOUNTER
Prescription approved per [facility] Refill Protocol.    Charlotte CESPEDES RN  EP Triage

## 2022-04-19 DIAGNOSIS — E78.5 HYPERLIPIDEMIA LDL GOAL <70: ICD-10-CM

## 2022-04-19 DIAGNOSIS — E11.9 TYPE 2 DIABETES MELLITUS WITHOUT COMPLICATION, WITHOUT LONG-TERM CURRENT USE OF INSULIN (H): ICD-10-CM

## 2022-04-19 NOTE — TELEPHONE ENCOUNTER
Routing refill request to provider for review/approval because:  Labs not current:  lipids  Jasmine Marie BSN, RN

## 2022-04-20 RX ORDER — ATORVASTATIN CALCIUM 10 MG/1
10 TABLET, FILM COATED ORAL DAILY
Qty: 90 TABLET | Refills: 3 | Status: SHIPPED | OUTPATIENT
Start: 2022-04-20 | End: 2022-09-01

## 2022-07-18 DIAGNOSIS — E11.9 TYPE 2 DIABETES MELLITUS WITHOUT COMPLICATION, WITHOUT LONG-TERM CURRENT USE OF INSULIN (H): ICD-10-CM

## 2022-07-20 RX ORDER — METFORMIN HCL 500 MG
1000 TABLET, EXTENDED RELEASE 24 HR ORAL 2 TIMES DAILY WITH MEALS
Qty: 360 TABLET | Refills: 0 | Status: SHIPPED | OUTPATIENT
Start: 2022-07-20 | End: 2022-10-19

## 2022-07-20 NOTE — TELEPHONE ENCOUNTER
Due for office visit, only 90-day supply submitted.      Please call and inform pt to schedule a/an an annual exam with PCP.

## 2022-07-20 NOTE — TELEPHONE ENCOUNTER
Routing refill request to provider for review/approval because:    Creatinine   Date Value Ref Range Status   03/09/2021 0.90 0.66 - 1.25 mg/dL Final     .  Lab Results   Component Value Date    A1C 7.3 10/20/2021    A1C 7.0 07/27/2021    A1C 10.2 03/09/2021       Last office vist: 10/21/21    Pended 90 day supply & 0 refills. Please Review.    Next office visit: none- routing to TC to assist with scheduling.       Patrick Romo RN  [facility]

## 2022-08-30 ENCOUNTER — OFFICE VISIT (OUTPATIENT)
Dept: FAMILY MEDICINE | Facility: CLINIC | Age: 37
End: 2022-08-30
Payer: COMMERCIAL

## 2022-08-30 VITALS
RESPIRATION RATE: 16 BRPM | BODY MASS INDEX: 26.14 KG/M2 | TEMPERATURE: 97.4 F | HEART RATE: 90 BPM | HEIGHT: 72 IN | OXYGEN SATURATION: 98 % | SYSTOLIC BLOOD PRESSURE: 110 MMHG | DIASTOLIC BLOOD PRESSURE: 72 MMHG | WEIGHT: 193 LBS

## 2022-08-30 DIAGNOSIS — Z00.00 ROUTINE GENERAL MEDICAL EXAMINATION AT A HEALTH CARE FACILITY: Primary | ICD-10-CM

## 2022-08-30 DIAGNOSIS — Z13.220 SCREENING FOR HYPERLIPIDEMIA: ICD-10-CM

## 2022-08-30 DIAGNOSIS — E11.9 TYPE 2 DIABETES MELLITUS WITHOUT COMPLICATION, WITHOUT LONG-TERM CURRENT USE OF INSULIN (H): ICD-10-CM

## 2022-08-30 DIAGNOSIS — Z23 HIGH PRIORITY FOR 2019-NCOV VACCINE: ICD-10-CM

## 2022-08-30 LAB — HBA1C MFR BLD: 7.5 % (ref 0–5.6)

## 2022-08-30 PROCEDURE — 80061 LIPID PANEL: CPT | Performed by: INTERNAL MEDICINE

## 2022-08-30 PROCEDURE — 82043 UR ALBUMIN QUANTITATIVE: CPT | Performed by: INTERNAL MEDICINE

## 2022-08-30 PROCEDURE — 91305 COVID-19,PF,PFIZER (12+ YRS): CPT | Performed by: INTERNAL MEDICINE

## 2022-08-30 PROCEDURE — 99395 PREV VISIT EST AGE 18-39: CPT | Mod: 25 | Performed by: INTERNAL MEDICINE

## 2022-08-30 PROCEDURE — 36415 COLL VENOUS BLD VENIPUNCTURE: CPT | Performed by: INTERNAL MEDICINE

## 2022-08-30 PROCEDURE — 0054A COVID-19,PF,PFIZER (12+ YRS): CPT | Performed by: INTERNAL MEDICINE

## 2022-08-30 PROCEDURE — 99214 OFFICE O/P EST MOD 30 MIN: CPT | Mod: 25 | Performed by: INTERNAL MEDICINE

## 2022-08-30 PROCEDURE — 83036 HEMOGLOBIN GLYCOSYLATED A1C: CPT | Performed by: INTERNAL MEDICINE

## 2022-08-30 PROCEDURE — 80053 COMPREHEN METABOLIC PANEL: CPT | Performed by: INTERNAL MEDICINE

## 2022-08-30 ASSESSMENT — ENCOUNTER SYMPTOMS
ABDOMINAL PAIN: 0
HEMATURIA: 0
ARTHRALGIAS: 0
WEAKNESS: 0
MYALGIAS: 0
SORE THROAT: 0
NERVOUS/ANXIOUS: 0
FREQUENCY: 0
DIZZINESS: 0
HEARTBURN: 0
SHORTNESS OF BREATH: 0
PARESTHESIAS: 0
HEMATOCHEZIA: 0
DIARRHEA: 0
COUGH: 0
PALPITATIONS: 0
NAUSEA: 0
DYSURIA: 0
EYE PAIN: 0
CHILLS: 0
JOINT SWELLING: 0
CONSTIPATION: 0
HEADACHES: 0
FEVER: 0

## 2022-08-30 ASSESSMENT — PAIN SCALES - GENERAL: PAINLEVEL: NO PAIN (0)

## 2022-08-30 ASSESSMENT — PATIENT HEALTH QUESTIONNAIRE - PHQ9
SUM OF ALL RESPONSES TO PHQ QUESTIONS 1-9: 0
SUM OF ALL RESPONSES TO PHQ QUESTIONS 1-9: 0
10. IF YOU CHECKED OFF ANY PROBLEMS, HOW DIFFICULT HAVE THESE PROBLEMS MADE IT FOR YOU TO DO YOUR WORK, TAKE CARE OF THINGS AT HOME, OR GET ALONG WITH OTHER PEOPLE: NOT DIFFICULT AT ALL

## 2022-08-30 NOTE — PROGRESS NOTES
SUBJECTIVE:   CC: Blanca Zee is an 37 year old male who presents for preventative health visit.       Patient has been advised of split billing requirements and indicates understanding: Yes  Healthy Habits:     Getting at least 3 servings of Calcium per day:  Yes    Bi-annual eye exam:  Yes    Dental care twice a year:  Yes    Sleep apnea or symptoms of sleep apnea:  None    Diet:  Low fat/cholesterol, Diabetic and Vegetarian/vegan    Frequency of exercise:  2-3 days/week    Duration of exercise:  45-60 minutes    Taking medications regularly:  Yes    Medication side effects:  None    PHQ-2 Total Score: 0    Additional concerns today:  No      Today's PHQ-2 Score:   PHQ-2 ( 1999 [employer]) 8/30/2022   Q1: Little interest or pleasure in doing things 0   Q2: Feeling down, depressed or hopeless 0   PHQ-2 Score 0   PHQ-2 Total Score (12-17 Years)- Positive if 3 or more points; Administer PHQ-A if positive -   Q1: Little interest or pleasure in doing things Nearly every day   Q2: Feeling down, depressed or hopeless Nearly every day   PHQ-2 Score 6       Abuse: Current or Past(Physical, Sexual or Emotional)- No  Do you feel safe in your environment? Yes    Social History     Tobacco Use     Smoking status: Never Smoker     Smokeless tobacco: Never Used   Substance Use Topics     Alcohol use: Not Currently         Alcohol Use 8/30/2022   Prescreen: >3 drinks/day or >7 drinks/week? No   Prescreen: >3 drinks/day or >7 drinks/week? -       Last PSA: No results found for: PSA    Reviewed orders with patient. Reviewed health maintenance and updated orders accordingly - Yes  Lab work is in process  Labs reviewed in EPIC    Reviewed and updated as needed this visit by clinical staff   Tobacco  Allergies  Meds  Problems  Med Hx  Surg Hx  Fam Hx            Reviewed and updated as needed this visit by Provider   Tobacco  Allergies  Meds  Problems  Med Hx  Surg Hx  Fam Hx           History reviewed. No pertinent past  medical history.   History reviewed. No pertinent surgical history.    Review of Systems   Constitutional: Negative for chills and fever.   HENT: Negative for congestion, ear pain, hearing loss and sore throat.    Eyes: Negative for pain and visual disturbance.   Respiratory: Negative for cough and shortness of breath.    Cardiovascular: Negative for chest pain, palpitations and peripheral edema.   Gastrointestinal: Negative for abdominal pain, constipation, diarrhea, heartburn, hematochezia and nausea.   Genitourinary: Negative for dysuria, frequency, genital sores, hematuria, impotence, penile discharge and urgency.   Musculoskeletal: Negative for arthralgias, joint swelling and myalgias.   Skin: Negative for rash.   Neurological: Negative for dizziness, weakness, headaches and paresthesias.   Psychiatric/Behavioral: Negative for mood changes. The patient is not nervous/anxious.      CONSTITUTIONAL: NEGATIVE for fever, chills, change in weight  INTEGUMENTARY/SKIN: NEGATIVE for worrisome rashes, moles or lesions  EYES: NEGATIVE for vision changes or irritation  ENT: NEGATIVE for ear, mouth and throat problems  RESP: NEGATIVE for significant cough or SOB  CV: NEGATIVE for chest pain, palpitations or peripheral edema  GI: NEGATIVE for nausea, abdominal pain, heartburn, or change in bowel habits   male: negative for dysuria, hematuria, decreased urinary stream, erectile dysfunction, urethral discharge  MUSCULOSKELETAL: NEGATIVE for significant arthralgias or myalgia  NEURO: NEGATIVE for weakness, dizziness or paresthesias  PSYCHIATRIC: NEGATIVE for changes in mood or affect    OBJECTIVE:   /72 (BP Location: Right arm, Patient Position: Sitting, Cuff Size: Adult Regular)   Pulse 90   Temp 97.4  F (36.3  C) (Tympanic)   Resp 16   Ht 1.829 m (6')   Wt 87.5 kg (193 lb)   SpO2 98%   BMI 26.18 kg/m      Physical Exam  GENERAL: healthy, alert and no distress  EYES: Eyes grossly normal to inspection, PERRL and  conjunctivae and sclerae normal  HENT: ear canals and TM's normal, nose and mouth without ulcers or lesions  NECK: no adenopathy, no asymmetry, masses, or scars and thyroid normal to palpation  RESP: lungs clear to auscultation - no rales, rhonchi or wheezes  CV: regular rate and rhythm, normal S1 S2, no S3 or S4, no murmur, click or rub, no peripheral edema and peripheral pulses strong  ABDOMEN: soft, nontender, no hepatosplenomegaly, no masses and bowel sounds normal  MS: no gross musculoskeletal defects noted, no edema  SKIN: no suspicious lesions or rashes  NEURO: Normal strength and tone, mentation intact and speech normal  PSYCH: mentation appears normal, affect normal/bright    Diagnostic Test Results:  Labs reviewed in Epic    ASSESSMENT/PLAN:     Assessment and Plan  1. Routine general medical examination at a health care facility  Last seen pt in 10/2021 for diabetes follow up. He is here for physical. Last A1C at that time 7.3% on on medications Metformin XR 1000 mg BID . Pt does endorses that he has been using his left over Lantus given by previous providers in the past around once a month when the BG is high at home. Discussed on using only the prescribed medications at this time for better management as we have already discontinued Lantus which was in the past given patient borderline Diabetes and was never started on PO diabetes medications at all.   - Comprehensive metabolic panel (BMP + Alb, Alk Phos, ALT, AST, Total. Bili, TP); Future  - HEMOGLOBIN A1C; Future  - Adult Eye  Referral; Future  - Albumin Random Urine Quantitative with Creat Ratio; Future  - Comprehensive metabolic panel (BMP + Alb, Alk Phos, ALT, AST, Total. Bili, TP); Future    2. Type 2 diabetes mellitus without complication, without long-term current use of insulin (H)  Discussed on adding second oral medication if A1C above goal. Placed referral to Ophthalmology for diabetes eye exam.   UPDATE - A1C is mildly worsened  compared to the past. I have added low dose Glipizide as discussed  - HEMOGLOBIN A1C; Future  - Adult Eye  Referral; Future  - Albumin Random Urine Quantitative with Creat Ratio; Future  - Comprehensive metabolic panel (BMP + Alb, Alk Phos, ALT, AST, Total. Bili, TP); Future  - glipiZIDE (GLUCOTROL XL) 2.5 MG 24 hr tablet; Take 1 tablet (2.5 mg) by mouth daily  Dispense: 90 tablet; Refill: 1    3. Screening for hyperlipidemia  - Lipid panel reflex to direct LDL Non-fasting; Future    4. High priority for 2019-nCoV vaccine  - COVID-19,PF,PFIZER (12+ Yrs GRAY LABEL)     Patient Instructions   As discussed , please do fasting lab work ordered.   Please use Metformin as prescribed and we can always additional oral medications for improvement.     ========    Preventive Health Recommendations  Male Ages 26 - 39    Yearly exam:             See your health care provider every year in order to  o   Review health changes.   o   Discuss preventive care.    o   Review your medicines if your doctor has prescribed any.    You should be tested each year for STDs (sexually transmitted diseases), if you re at risk.     After age 35, talk to your provider about cholesterol testing. If you are at risk for heart disease, have your cholesterol tested at least every 5 years.     If you are at risk for diabetes, you should have a diabetes test (fasting glucose).  Shots: Get a flu shot each year. Get a tetanus shot every 10 years.     Nutrition:    Eat at least 5 servings of fruits and vegetables daily.     Eat whole-grain bread, whole-wheat pasta and brown rice instead of white grains and rice.     Get adequate Calcium and Vitamin D.     Lifestyle    Exercise for at least 150 minutes a week (30 minutes a day, 5 days a week). This will help you control your weight and prevent disease.     Limit alcohol to one drink per day.     No smoking.     Wear sunscreen to prevent skin cancer.     See your dentist every six months for an  exam and cleaning.       Return in about 6 months (around 2/28/2023), or if symptoms worsen or fail to improve, for If symptoms persist, Follow up of last visit, diabetes.    Melissa Lam MD  [facility]      Patient has been advised of split billing requirements and indicates understanding: Yes    COUNSELING:   Reviewed preventive health counseling, as reflected in patient instructions  Special attention given to:        Regular exercise       Healthy diet/nutrition       Vision screening       Hearing screening       Alcohol Use     Estimated body mass index is 26.18 kg/m  as calculated from the following:    Height as of this encounter: 1.829 m (6').    Weight as of this encounter: 87.5 kg (193 lb).     Weight management plan: Discussed healthy diet and exercise guidelines    He reports that he has never smoked. He has never used smokeless tobacco.      Counseling Resources:  ATP IV Guidelines  Pooled Cohorts Equation Calculator  FRAX Risk Assessment  ICSI Preventive Guidelines  Dietary Guidelines for Americans, 2010  USDA's MyPlate  ASA Prophylaxis  Lung CA Screening    Melissa Lam MD  [facility]  Answers for HPI/ROS submitted by the patient on 8/30/2022  If you checked off any problems, how difficult have these problems made it for you to do your work, take care of things at home, or get along with other people?: Not difficult at all  PHQ9 TOTAL SCORE: 0

## 2022-08-30 NOTE — PATIENT INSTRUCTIONS
As discussed , please do fasting lab work ordered.   Please use Metformin as prescribed and we can always additional oral medications for improvement.     ========    Preventive Health Recommendations  Male Ages 26 - 39    Yearly exam:             See your health care provider every year in order to  o   Review health changes.   o   Discuss preventive care.    o   Review your medicines if your doctor has prescribed any.  You should be tested each year for STDs (sexually transmitted diseases), if you re at risk.   After age 35, talk to your provider about cholesterol testing. If you are at risk for heart disease, have your cholesterol tested at least every 5 years.   If you are at risk for diabetes, you should have a diabetes test (fasting glucose).  Shots: Get a flu shot each year. Get a tetanus shot every 10 years.     Nutrition:  Eat at least 5 servings of fruits and vegetables daily.   Eat whole-grain bread, whole-wheat pasta and brown rice instead of white grains and rice.   Get adequate Calcium and Vitamin D.     Lifestyle  Exercise for at least 150 minutes a week (30 minutes a day, 5 days a week). This will help you control your weight and prevent disease.   Limit alcohol to one drink per day.   No smoking.   Wear sunscreen to prevent skin cancer.   See your dentist every six months for an exam and cleaning.

## 2022-08-31 ENCOUNTER — TELEPHONE (OUTPATIENT)
Dept: FAMILY MEDICINE | Facility: CLINIC | Age: 37
End: 2022-08-31

## 2022-08-31 LAB
ALBUMIN SERPL-MCNC: 4.3 G/DL (ref 3.4–5)
ALP SERPL-CCNC: 123 U/L (ref 40–150)
ALT SERPL W P-5'-P-CCNC: 25 U/L (ref 0–70)
ANION GAP SERPL CALCULATED.3IONS-SCNC: 11 MMOL/L (ref 3–14)
AST SERPL W P-5'-P-CCNC: 23 U/L (ref 0–45)
BILIRUB SERPL-MCNC: 0.6 MG/DL (ref 0.2–1.3)
BUN SERPL-MCNC: 6 MG/DL (ref 7–30)
CALCIUM SERPL-MCNC: 9.5 MG/DL (ref 8.5–10.1)
CHLORIDE BLD-SCNC: 102 MMOL/L (ref 94–109)
CHOLEST SERPL-MCNC: 160 MG/DL
CO2 SERPL-SCNC: 24 MMOL/L (ref 20–32)
CREAT SERPL-MCNC: 0.92 MG/DL (ref 0.66–1.25)
CREAT UR-MCNC: 82 MG/DL
FASTING STATUS PATIENT QL REPORTED: YES
GFR SERPL CREATININE-BSD FRML MDRD: >90 ML/MIN/1.73M2
GLUCOSE BLD-MCNC: 128 MG/DL (ref 70–99)
HDLC SERPL-MCNC: 33 MG/DL
LDLC SERPL CALC-MCNC: 85 MG/DL
MICROALBUMIN UR-MCNC: 5 MG/L
MICROALBUMIN/CREAT UR: 6.1 MG/G CR (ref 0–17)
NONHDLC SERPL-MCNC: 127 MG/DL
POTASSIUM BLD-SCNC: 4.4 MMOL/L (ref 3.4–5.3)
PROT SERPL-MCNC: 7.8 G/DL (ref 6.8–8.8)
SODIUM SERPL-SCNC: 137 MMOL/L (ref 133–144)
TRIGL SERPL-MCNC: 209 MG/DL

## 2022-08-31 RX ORDER — GLIPIZIDE 2.5 MG/1
2.5 TABLET, EXTENDED RELEASE ORAL DAILY
Qty: 90 TABLET | Refills: 1 | Status: SHIPPED | OUTPATIENT
Start: 2022-08-31 | End: 2023-04-10

## 2022-08-31 NOTE — TELEPHONE ENCOUNTER
----- Message from Melissa Lam MD sent at 8/31/2022 12:21 AM CDT -----  Your A1C is mildly worsened compared to the past. I have added low dose Glipizide as discussed in your office visit with me.   .                                                                                                                                                                                                                                                                                                                                                                                                                                                                                                                                                                                                                                                                                                                                                                                                                                                                                                     Thank you,  Melissa Lam MD on 8/31/2022

## 2022-09-01 ENCOUNTER — TELEPHONE (OUTPATIENT)
Dept: FAMILY MEDICINE | Facility: CLINIC | Age: 37
End: 2022-09-01

## 2022-09-01 DIAGNOSIS — E78.5 HYPERLIPIDEMIA LDL GOAL <70: Primary | ICD-10-CM

## 2022-09-01 RX ORDER — ATORVASTATIN CALCIUM 20 MG/1
20 TABLET, FILM COATED ORAL DAILY
Qty: 90 TABLET | Refills: 3 | Status: SHIPPED | OUTPATIENT
Start: 2022-09-01

## 2022-09-01 NOTE — TELEPHONE ENCOUNTER
----- Message from Melissa Lam MD sent at 9/1/2022  1:27 AM CDT -----  Your Cholesterol levels are remaining mildly abnormal inspite of your current Lipitor. Recommend to increase the dose to 20 mg daily for improvement.     All your other labs normal, you may see some highlighted which do not have Clinical significance.  Melissa Lam MD on 9/1/2022

## 2022-09-01 NOTE — TELEPHONE ENCOUNTER
Patient given result message from Dr. Lam.  Patient verbalizes understanding and agrees with plan.   Advised patient to take atorvastatin 10 mg, 2 daily until current supply is gone.   Please send new rx to [facility].   My Woods RN

## 2022-09-01 NOTE — TELEPHONE ENCOUNTER
Patient given message from Dr. Lam. Patient verbalizes understanding and agrees to schedule follow up visit in 6 months. My Woods RN

## 2022-09-23 ENCOUNTER — OFFICE VISIT (OUTPATIENT)
Dept: OPHTHALMOLOGY | Facility: CLINIC | Age: 37
End: 2022-09-23
Attending: INTERNAL MEDICINE
Payer: COMMERCIAL

## 2022-09-23 DIAGNOSIS — H11.153 PINGUECULA OF BOTH EYES: ICD-10-CM

## 2022-09-23 DIAGNOSIS — H25.013 CORTICAL SENILE CATARACT OF BOTH EYES: ICD-10-CM

## 2022-09-23 DIAGNOSIS — E11.9 TYPE 2 DIABETES MELLITUS WITHOUT RETINOPATHY (H): Primary | ICD-10-CM

## 2022-09-23 DIAGNOSIS — Z00.00 ROUTINE GENERAL MEDICAL EXAMINATION AT A HEALTH CARE FACILITY: ICD-10-CM

## 2022-09-23 DIAGNOSIS — E11.9 TYPE 2 DIABETES MELLITUS WITHOUT COMPLICATION, WITHOUT LONG-TERM CURRENT USE OF INSULIN (H): ICD-10-CM

## 2022-09-23 PROCEDURE — 92004 COMPRE OPH EXAM NEW PT 1/>: CPT | Performed by: OPHTHALMOLOGY

## 2022-09-23 ASSESSMENT — REFRACTION_MANIFEST
OD_SPHERE: PLANO
OD_CYLINDER: SPHERE
OS_CYLINDER: SPHERE
OS_SPHERE: PLANO

## 2022-09-23 ASSESSMENT — CONF VISUAL FIELD
OD_NORMAL: 1
OS_NORMAL: 1

## 2022-09-23 ASSESSMENT — TONOMETRY
IOP_METHOD: TONOPEN
OS_IOP_MMHG: 18
OD_IOP_MMHG: 18

## 2022-09-23 ASSESSMENT — CUP TO DISC RATIO
OS_RATIO: 0.4
OD_RATIO: 0.4

## 2022-09-23 ASSESSMENT — VISUAL ACUITY
OD_SC: 20/20
METHOD: SNELLEN - LINEAR
OS_SC: 20/20

## 2022-09-23 ASSESSMENT — SLIT LAMP EXAM - LIDS
COMMENTS: NORMAL
COMMENTS: NORMAL

## 2022-09-23 NOTE — NURSING NOTE
Chief Complaints and History of Present Illnesses   Patient presents with     Diabetic Eye Exam       Chief Complaint(s) and History of Present Illness(es)     Diabetic Eye Exam     Vision: is stable    Diabetes Type: Type 2    Duration: 1 year              Comments     He notes vision has been good for distance and near.  He denies pain/discomfort in either eye.  No eye drops.     Lab Results   Component Value Date    A1C 7.5 08/30/2022    A1C 7.3 10/20/2021    A1C 7.0 07/27/2021    A1C 10.2 03/09/2021                     ASHLEY De Leon  12:34 PM 09/23/2022

## 2022-09-23 NOTE — PROGRESS NOTES
HPI     Diabetic Eye Exam     Vision is stable.  Diabetes characteristics include Type 2.  Duration of 1 year.              Comments     He notes vision has been good for distance and near.  He denies pain/discomfort in either eye.  No eye drops.     Lab Results       Component                Value               Date                       A1C                      7.5                 08/30/2022                 A1C                      7.3                 10/20/2021                 A1C                      7.0                 07/27/2021                 A1C                      10.2                03/09/2021                     Last edited by Emile Awad MD on 9/23/2022 12:52 PM. (History)         Review of systems for the eyes was negative other than the pertinent positives/negatives listed in the HPI.      Assessment & Plan    HPI:  Blanca Zee is a 37 year old male with 1 year history of T2DM presents for annual dilated fundus exam. NO acute complaints today.      POHx: denies  PMHx: T2DM  Current Medications: alcohol swab prep pads, Use to swab area of injection/yazmin as directed.  atorvastatin (LIPITOR) 20 MG tablet, Take 1 tablet (20 mg) by mouth daily  blood glucose (NO BRAND SPECIFIED) test strip, Use to test blood sugar 1 times daily or as directed. To accompany: Blood Glucose Monitor Brands: per insurance.  blood glucose calibration (NO BRAND SPECIFIED) solution, To accompany: Blood Glucose Monitor Brands: per insurance.  blood glucose monitoring (NO BRAND SPECIFIED) meter device kit, Use to test blood sugar 1 times daily or as directed. Preferred blood glucose meter OR supplies to accompany: Blood Glucose Monitor Brands: per insurance.  glipiZIDE (GLUCOTROL XL) 2.5 MG 24 hr tablet, Take 1 tablet (2.5 mg) by mouth daily  lisinopril (ZESTRIL) 5 MG tablet, Take 1 tablet (5 mg) by mouth daily  metFORMIN (GLUCOPHAGE XR) 500 MG 24 hr tablet, Take 2 tablets (1,000 mg) by mouth 2 times daily  (with meals)  thin (NO BRAND SPECIFIED) lancets, Use with lanceting device. To accompany: Blood Glucose Monitor Brands: per insurance.    No current facility-administered medications on file prior to visit.    FHx: denies family history of ocular conditions   PSHx: denies history of ocular surgeries       Current Eye Medications:      Assessment & Plan:  (E11.9) Type 2 diabetes mellitus without retinopathy (H)  (primary encounter diagnosis)  (E11.9) Type 2 diabetes mellitus without complication, without long-term current use of insulin (H)  Diagnosed 2021  Most recent HgBA1c 7.5 on 8/30/22  No background diabetic retinopathy or neovascularization noted on today's exam.  Discussed ocular and systemic benefits of blood pressure and blood sugar control.  Return in 1 year for full exam with dilation or sooner if changes to vision.        (Z00.00) Routine general medical examination at a health care facility  Normal eye exam    Pinguecula  Discussed UV protection for prevention and using artificial tears for symptomatic relief  May require steroid drops or surgical excision if inflamed or inducing significant corneal astigmatism    Cortical cataract, both eyes  Mild cataracts are present . No treatment currently recommended. The patient will monitor for vision changes and contact us with any decrease in vision. Recheck in one year.     Return in about 1 year (around 9/23/2023) for Annual Visit.        Eimle Awad MD     Attending Physician Attestation:  Complete documentation of historical and exam elements from today's encounter can be found in the full encounter summary report (not reduplicated in this progress note).  I personally obtained the chief complaint(s) and history of present illness.  I confirmed and edited as necessary the review of systems, past medical/surgical history, family history, social history, and examination findings as documented by others; and I examined the patient myself.  I  personally reviewed the relevant tests, images, and reports as documented above.  I formulated and edited as necessary the assessment and plan and discussed the findings and management plan with the patient and family. - Emile Awad MD

## 2022-09-23 NOTE — LETTER
9/23/2022       RE: Blanca Zee  [address]  [place] [address]     Dear Dr. Stokes,    Thank you for referring your patient, Blanca Zee, to the [facility]. Please see a copy of my visit note below.    HPI     Diabetic Eye Exam     Vision is stable.  Diabetes characteristics include Type 2.  Duration of 1 year.              Comments     He notes vision has been good for distance and near.  He denies pain/discomfort in either eye.  No eye drops.     Lab Results       Component                Value               Date                       A1C                      7.5                 08/30/2022                 A1C                      7.3                 10/20/2021                 A1C                      7.0                 07/27/2021                 A1C                      10.2                03/09/2021                     Last edited by Emile Awad MD on 9/23/2022 12:52 PM. (History)         Review of systems for the eyes was negative other than the pertinent positives/negatives listed in the HPI.      Assessment & Plan    HPI:  Blanca Zee is a 37 year old male with 1 year history of T2DM presents for annual dilated fundus exam. NO acute complaints today.      POHx: denies  PMHx: T2DM  Current Medications: alcohol swab prep pads, Use to swab area of injection/yazmin as directed.  atorvastatin (LIPITOR) 20 MG tablet, Take 1 tablet (20 mg) by mouth daily  blood glucose (NO BRAND SPECIFIED) test strip, Use to test blood sugar 1 times daily or as directed. To accompany: Blood Glucose Monitor Brands: per insurance.  blood glucose calibration (NO BRAND SPECIFIED) solution, To accompany: Blood Glucose Monitor Brands: per insurance.  blood glucose monitoring (NO BRAND SPECIFIED) meter device kit, Use to test blood sugar 1 times daily or as directed. Preferred blood glucose meter OR  supplies to accompany: Blood Glucose Monitor Brands: per insurance.  glipiZIDE (GLUCOTROL XL) 2.5 MG 24 hr tablet, Take 1 tablet (2.5 mg) by mouth daily  lisinopril (ZESTRIL) 5 MG tablet, Take 1 tablet (5 mg) by mouth daily  metFORMIN (GLUCOPHAGE XR) 500 MG 24 hr tablet, Take 2 tablets (1,000 mg) by mouth 2 times daily (with meals)  thin (NO BRAND SPECIFIED) lancets, Use with lanceting device. To accompany: Blood Glucose Monitor Brands: per insurance.    No current facility-administered medications on file prior to visit.    FHx: denies family history of ocular conditions   PSHx: denies history of ocular surgeries       Current Eye Medications:      Assessment & Plan:  (E11.9) Type 2 diabetes mellitus without retinopathy (H)  (primary encounter diagnosis)  (E11.9) Type 2 diabetes mellitus without complication, without long-term current use of insulin (H)  Diagnosed 2021  Most recent HgBA1c 7.5 on 8/30/22  No background diabetic retinopathy or neovascularization noted on today's exam.  Discussed ocular and systemic benefits of blood pressure and blood sugar control.  Return in 1 year for full exam with dilation or sooner if changes to vision.        (Z00.00) Routine general medical examination at a health care facility  Normal eye exam    Pinguecula  Discussed UV protection for prevention and using artificial tears for symptomatic relief  May require steroid drops or surgical excision if inflamed or inducing significant corneal astigmatism    Cortical cataract, both eyes  Mild cataracts are present . No treatment currently recommended. The patient will monitor for vision changes and contact us with any decrease in vision. Recheck in one year.     Return in about 1 year (around 9/23/2023) for Annual Visit.        Emile Awad MD     Attending Physician Attestation:  Complete documentation of historical and exam elements from today's encounter can be found in the full encounter summary report (not reduplicated  in this progress note).  I personally obtained the chief complaint(s) and history of present illness.  I confirmed and edited as necessary the review of systems, past medical/surgical history, family history, social history, and examination findings as documented by others; and I examined the patient myself.  I personally reviewed the relevant tests, images, and reports as documented above.  I formulated and edited as necessary the assessment and plan and discussed the findings and management plan with the patient and family. - Emile Awad MD             Again, thank you for allowing me to participate in the care of your patient.      Sincerely,    Emile Awad MD

## 2022-10-16 DIAGNOSIS — E11.9 TYPE 2 DIABETES MELLITUS WITHOUT COMPLICATION, WITHOUT LONG-TERM CURRENT USE OF INSULIN (H): ICD-10-CM

## 2022-10-19 RX ORDER — METFORMIN HCL 500 MG
TABLET, EXTENDED RELEASE 24 HR ORAL
Qty: 360 TABLET | Refills: 0 | Status: SHIPPED | OUTPATIENT
Start: 2022-10-19 | End: 2023-04-10

## 2022-10-19 NOTE — TELEPHONE ENCOUNTER
Prescription approved per [facility] Refill Protocol.  Jenise Silva, RN  [facility] Triage Nurse

## 2022-11-03 NOTE — TELEPHONE ENCOUNTER
Prescription approved per [facility] Refill Protocol.    Dianna BARNETT RN  EP Triage    
pcp

## 2023-02-14 DIAGNOSIS — E11.9 TYPE 2 DIABETES MELLITUS WITHOUT COMPLICATION, WITHOUT LONG-TERM CURRENT USE OF INSULIN (H): ICD-10-CM

## 2023-02-14 RX ORDER — LISINOPRIL 5 MG/1
TABLET ORAL
Qty: 90 TABLET | Refills: 1 | Status: SHIPPED | OUTPATIENT
Start: 2023-02-14 | End: 2023-08-29

## 2023-02-18 NOTE — TELEPHONE ENCOUNTER
Refill done.    Please call the patient and schedule Virtual visit for diabetes follow up with me, which patient is due at this time, to further take care of the medical conditions and medications.OK to use same day slot / double book near another virtual slot in 1 weeks.     Thank you,  Melissa Lam MD on 2/18/2023 at 3:26 PM

## 2023-03-17 ENCOUNTER — TELEPHONE (OUTPATIENT)
Dept: FAMILY MEDICINE | Facility: CLINIC | Age: 38
End: 2023-03-17
Payer: COMMERCIAL

## 2023-03-17 NOTE — TELEPHONE ENCOUNTER
Reason for Call:  Appointment Request    Patient requesting this type of appt: Chronic Diease Management/Medication/Follow-Up    Requested provider: Melissa Lam    Reason patient unable to be scheduled: Not within requested timeframe    When does patient want to be seen/preferred time: Monday March 20th, 10 AM    Comments: Wants Diabetic/A1C Check with [facility] nobody else. Please reach out to patient.     Okay to leave a detailed message?: Yes at Cell number on file:    Telephone Information:   Mobile [phone]       Call taken on 3/17/2023 at 11:36 AM by Phyllis Walton

## 2023-03-19 NOTE — TELEPHONE ENCOUNTER
Please call the patient and schedule Virtual visit / Inperson for medication follow up with me, which patient is due at this time, to further take care of the medical conditions and medications.OK to use same day slot / double book near another virtual slot as requested by the patient this week ( any of my working days >> seems like patient requesting on Monday 3/20/2023 which is my day off ) .     Thank you,  Melissa Lam MD on 3/18/2023

## 2023-03-29 ENCOUNTER — VIRTUAL VISIT (OUTPATIENT)
Dept: FAMILY MEDICINE | Facility: CLINIC | Age: 38
End: 2023-03-29
Payer: COMMERCIAL

## 2023-03-29 DIAGNOSIS — E11.9 TYPE 2 DIABETES MELLITUS WITHOUT COMPLICATION, WITHOUT LONG-TERM CURRENT USE OF INSULIN (H): ICD-10-CM

## 2023-03-29 DIAGNOSIS — E11.9 TYPE 2 DIABETES MELLITUS WITHOUT RETINOPATHY (H): ICD-10-CM

## 2023-03-29 PROCEDURE — 99441 PR PHYSICIAN TELEPHONE EVALUATION 5-10 MIN: CPT | Mod: 93 | Performed by: INTERNAL MEDICINE

## 2023-03-29 NOTE — PATIENT INSTRUCTIONS
As discussed , please do LAB only appointment for your A1C check ordered ( no need of fasting for this )   Will adjust the dose of medications once I receive the lab report.   =======================

## 2023-03-29 NOTE — PROGRESS NOTES
Blanca is a 38 year old who is being evaluated via a billable telephone visit.      What phone number would you like to be contacted at? [phone]  How would you like to obtain your AVS? Mail a copy    Distant Location (provider location):  On-site      Assessment and Plan  1. Type 2 diabetes mellitus without retinopathy (H)  Uncontrolled on the last check. Last seen pt on 8/2022 for Annual physical. He is here for follow up of diabetes.  Last A1C at that time at 7.5% with currently on Metformin 1000 mg BIDm Glipizide 2.5 mg daily. [facility] eye check in 9/2022 normal.  - Hemoglobin A1c; Future      UPDATE -     1. Type 2 diabetes mellitus without retinopathy (H)  2. Type 2 diabetes mellitus without complication, without long-term current use of insulin (H)  A1C mildly worsened from the past lab work at 8.1%, in spite of current Metformin 1000 mg BID and Glipizide 2.5 mg daily. Will increase the dose of Glipizide and sent to pharmacy.   - Hemoglobin A1c; Future  - glipiZIDE (GLUCOTROL XL) 5 MG 24 hr tablet; Take 1 tablet (5 mg) by mouth daily  Dispense: 90 tablet; Refill: 1  - metFORMIN (GLUCOPHAGE XR) 500 MG 24 hr tablet; Take 2 tablets (1,000 mg) by mouth 2 times daily (with meals)  Dispense: 360 tablet; Refill: 1       Patient Instructions   As discussed , please do LAB only appointment for your A1C check ordered ( no need of fasting for this )   Will adjust the dose of medications once I receive the lab report.   =======================    Return in about 5 months (around 8/20/2023), or if symptoms worsen or fail to improve, for Preventative Visit.    Melissa Lam MD  [facility]      Adam Rios is a 38 year old, presenting for the following health issues:  Recheck Medication and Diabetes    HPI     Diabetes Follow-up    How often are you checking your blood sugar? A few times a week  What time of day are you checking your blood sugars (select all that apply)?  Before and after  meals  Have you had any blood sugars above 200?  Yes after meals   Have you had any blood sugars below 70?  No    What symptoms do you notice when your blood sugar is low?  None    What concerns do you have today about your diabetes? None     Do you have any of these symptoms? (Select all that apply)  No numbness or tingling in feet.  No redness, sores or blisters on feet.  No complaints of excessive thirst.  No reports of blurry vision.  No significant changes to weight.      BP Readings from Last 2 Encounters:   08/30/22 110/72   10/20/21 132/84     Hemoglobin A1C (%)   Date Value   08/30/2022 7.5 (H)   10/20/2021 7.3 (H)   03/09/2021 10.2 (H)     LDL Cholesterol Calculated (mg/dL)   Date Value   08/30/2022 85   03/01/2021 142 (H)                 How many servings of fruits and vegetables do you eat daily?  0-1    On average, how many sweetened beverages do you drink each day (Examples: soda, juice, sweet tea, etc.  Do NOT count diet or artificially sweetened beverages)?   1    How many days per week do you exercise enough to make your heart beat faster? 3 or less    How many minutes a day do you exercise enough to make your heart beat faster? 30 - 60    How many days per week do you miss taking your medication? 0       No Known Allergies     History reviewed. No pertinent past medical history.    History reviewed. No pertinent surgical history.    History reviewed. No pertinent family history.    Social History     Tobacco Use     Smoking status: Never     Smokeless tobacco: Never   Substance Use Topics     Alcohol use: Not Currently        Current Outpatient Medications   Medication     alcohol swab prep pads     atorvastatin (LIPITOR) 20 MG tablet     blood glucose (NO BRAND SPECIFIED) test strip     blood glucose calibration (NO BRAND SPECIFIED) solution     blood glucose monitoring (NO BRAND SPECIFIED) meter device kit     glipiZIDE (GLUCOTROL XL) 2.5 MG 24 hr tablet     lisinopril (ZESTRIL) 5 MG tablet      metFORMIN (GLUCOPHAGE XR) 500 MG 24 hr tablet     thin (NO BRAND SPECIFIED) lancets     No current facility-administered medications for this visit.          Review of Systems   Constitutional, HEENT, cardiovascular, pulmonary, GI, , musculoskeletal, neuro, skin, endocrine and psych systems are negative, except as otherwise noted.      Objective           Vitals:  No vitals were obtained today due to virtual visit.    Physical Exam   healthy, alert and no distress  PSYCH: Alert and oriented times 3; coherent speech, normal   rate and volume, able to articulate logical thoughts, able   to abstract reason, no tangential thoughts, no hallucinations   or delusions  His affect is normal  RESP: No cough, no audible wheezing, able to talk in full sentences  Remainder of exam unable to be completed due to telephone visits      Phone call duration: 5 minutes

## 2023-03-29 NOTE — LETTER
March 29, 2023      Blanca Zee  [id number]   [place] [address]        Dear Blanca,    Thank you for choosing [facility] for your care.  We have included a summary of your after-visit instructions.    If you have any questions or need help with scheduling, please contact the clinic from which you receive your primary care. If you have any additional questions, call us at [phone].    Yours in [facility],   [facility] Care Team

## 2023-04-05 DIAGNOSIS — E11.9 TYPE 2 DIABETES MELLITUS WITHOUT COMPLICATION, WITHOUT LONG-TERM CURRENT USE OF INSULIN (H): ICD-10-CM

## 2023-04-05 RX ORDER — GLIPIZIDE 2.5 MG/1
TABLET, EXTENDED RELEASE ORAL
Qty: 90 TABLET | Refills: 0 | Status: SHIPPED | OUTPATIENT
Start: 2023-04-05 | End: 2023-05-05 | Stop reason: DRUGHIGH

## 2023-04-06 NOTE — TELEPHONE ENCOUNTER
Pt was supposed to do A1C check ordered in his VV with me in 3/2023 but he did not do it yet. Further refills after A1C check with LAB only appointment ( No need of fasting )    Refill done for now.  Melissa Lam MD on 4/5/2023

## 2023-04-10 ENCOUNTER — LAB (OUTPATIENT)
Dept: LAB | Facility: CLINIC | Age: 38
End: 2023-04-10
Payer: COMMERCIAL

## 2023-04-10 DIAGNOSIS — E11.9 TYPE 2 DIABETES MELLITUS WITHOUT RETINOPATHY (H): ICD-10-CM

## 2023-04-10 LAB — HBA1C MFR BLD: 8.1 % (ref 0–5.6)

## 2023-04-10 PROCEDURE — 36415 COLL VENOUS BLD VENIPUNCTURE: CPT

## 2023-04-10 PROCEDURE — 83036 HEMOGLOBIN GLYCOSYLATED A1C: CPT

## 2023-04-10 RX ORDER — METFORMIN HCL 500 MG
1000 TABLET, EXTENDED RELEASE 24 HR ORAL 2 TIMES DAILY WITH MEALS
Qty: 360 TABLET | Refills: 1 | Status: SHIPPED | OUTPATIENT
Start: 2023-04-10 | End: 2023-10-05

## 2023-04-10 RX ORDER — GLIPIZIDE 5 MG/1
5 TABLET, FILM COATED, EXTENDED RELEASE ORAL DAILY
Qty: 90 TABLET | Refills: 1 | Status: SHIPPED | OUTPATIENT
Start: 2023-04-10 | End: 2023-10-04

## 2023-04-12 NOTE — TELEPHONE ENCOUNTER
Called pt, LVM requesting callback to schedule appointment. 1st attempt     Chasidy Chicas RN on 4/12/2023 at 10:30 AM

## 2023-05-04 ENCOUNTER — VIRTUAL VISIT (OUTPATIENT)
Dept: FAMILY MEDICINE | Facility: CLINIC | Age: 38
End: 2023-05-04
Payer: COMMERCIAL

## 2023-05-04 DIAGNOSIS — M54.41 ACUTE MIDLINE LOW BACK PAIN WITH RIGHT-SIDED SCIATICA: Primary | ICD-10-CM

## 2023-05-04 PROCEDURE — 99441 PR PHYSICIAN TELEPHONE EVALUATION 5-10 MIN: CPT | Mod: 93 | Performed by: INTERNAL MEDICINE

## 2023-05-04 RX ORDER — CYCLOBENZAPRINE HCL 10 MG
10 TABLET ORAL 3 TIMES DAILY PRN
Qty: 30 TABLET | Refills: 2 | Status: SHIPPED | OUTPATIENT
Start: 2023-05-04 | End: 2023-10-04

## 2023-05-04 NOTE — PROGRESS NOTES
Blanca is a 38 year old who is being evaluated via a billable telephone visit.      What phone number would you like to be contacted at? [phone]  How would you like to obtain your AVS? Jong  Distant Location (provider location):  On-site    Assessment & Plan     Acute midline low back pain with right-sided sciatica  We discussed the side effects of cyclobenzaprine, but since it provided him with some symptom relief in the past, and it seems to be well-tolerated in the past, I think is a reasonable thing to try so that he can get some symptom relief tonight.  However, I did tell him to accurately diagnose his back problem, I do think he needs a face-to-face appointment and I arranged for him to see a physician assistant in this office at around 2 PM tomorrow.  I do not think empiric systemic corticosteroids are a russell idea since he does have a history of diabetes, I would want to have him examined before starting a systemic steroid.  He was very satisfied with the plan.  - cyclobenzaprine (FLEXERIL) 10 MG tablet; Take 1 tablet (10 mg) by mouth 3 times daily as needed for muscle spasms      11 minutes spent by me on the date of the encounter doing chart review, history and exam, documentation and further activities per the note       BMI:   Estimated body mass index is 26.18 kg/m  as calculated from the following:    Height as of 8/30/22: 1.829 m (6').    Weight as of 8/30/22: 87.5 kg (193 lb).           Omar Esparza MD  [facility]    Adam Rios is a 38 year old, presenting for the following health issues:  Back Pain (Pain going down rt leg, has had sciatica for years)          HPI       This is a very nice 38-year-old man who describes a 3-week history of right lower back pain with radiation down his right leg.  He denies any antecedent injury or trauma.  He had a similar presentation in August 2020 that was initially treated with a prednisone taper and the next day in the  emergency department treated with the medication that he remembered make him feel sleepy.  He wonders if he can get a prescription for the second medication because it really helped his pain.  He tried to make an in person appointment, but did not have any success.  He denies any new leg weakness or bowel or bladder symptoms.      Review of Systems         Objective    Vitals - Patient Reported  Weight (Patient Reported): 87.5 kg (193 lb)  Height (Patient Reported): 182.9 cm (6')  BMI (Based on Pt Reported Ht/Wt): 26.18  Pain Score: Extreme Pain (8)  Pain Loc: Other - see comment (whole leg)        Physical Exam   healthy, alert and no distress  PSYCH: Alert and oriented times 3; coherent speech, normal   rate and volume, able to articulate logical thoughts, able   to abstract reason, no tangential thoughts, no hallucinations   or delusions  His affect is normal  RESP: No cough, no audible wheezing, able to talk in full sentences  Remainder of exam unable to be completed due to telephone visits                Phone call duration: 6 minutes

## 2023-05-05 ENCOUNTER — OFFICE VISIT (OUTPATIENT)
Dept: FAMILY MEDICINE | Facility: CLINIC | Age: 38
End: 2023-05-05
Payer: COMMERCIAL

## 2023-05-05 VITALS
BODY MASS INDEX: 25.49 KG/M2 | OXYGEN SATURATION: 96 % | DIASTOLIC BLOOD PRESSURE: 85 MMHG | RESPIRATION RATE: 19 BRPM | SYSTOLIC BLOOD PRESSURE: 149 MMHG | WEIGHT: 188.2 LBS | TEMPERATURE: 97.4 F | HEART RATE: 101 BPM | HEIGHT: 72 IN

## 2023-05-05 DIAGNOSIS — M54.16 LUMBAR BACK PAIN WITH RADICULOPATHY AFFECTING RIGHT LOWER EXTREMITY: Primary | ICD-10-CM

## 2023-05-05 PROCEDURE — 99213 OFFICE O/P EST LOW 20 MIN: CPT | Performed by: PHYSICIAN ASSISTANT

## 2023-05-05 ASSESSMENT — PAIN SCALES - GENERAL: PAINLEVEL: EXTREME PAIN (8)

## 2023-05-05 NOTE — PATIENT INSTRUCTIONS
Take tylenol as needed for pain up to 1000mg three times daily, do not exceed 3000mg in 24 hour period    Take flexeril as needed

## 2023-05-05 NOTE — PROGRESS NOTES
"Assessment and Plan:     (M54.16) Lumbar back pain with radiculopathy affecting right lower extremity  (primary encounter diagnosis)  Comment: pin x 3 weeks, no new injury, remote history of injury about 10 years ago, no saddle anesthesia, no incontinence, he has weakness w/dorsiflexion of right big toe vs left on exam, he states he has had this in the past but resolved w/ZAIRA  Plan: Spine  Referral, urgent referral placed  Will hold off on prednisone due to uncontrolled DM  Tylenol and flexeril  Discussed when to be seen promptly including if he notices weakness is progressing, incontinence, saddle anesthesia       Sheri Mccormick PA-C        Subjective   Blanca is a 38 year old, presenting for the following health issues:  Back Pain and Leg Pain (3 wks,8 out of 10 pain)    HPI     Blanca is here for back pain and right lower extremity pain  The pain starts in his lateral right hip and radiates down his lateral leg  It  Started 3 weeks ago  He describes the pain as \"burning\"  He denies fall/injury, saddle anesthesia, incontinence, lower extremity weakness  He has had some numbness/tingling in his small toe on the right    He has remote history of an injury about 10 years ago, saw spine at that time and had ZAIRA which really helped      Review of Systems   See above      Objective    BP (!) 149/85 (BP Location: Right arm, Patient Position: Sitting, Cuff Size: Adult Regular)   Pulse 101   Temp 97.4  F (36.3  C) (Oral)   Resp 19   Ht 1.829 m (6')   Wt 85.4 kg (188 lb 3.2 oz)   SpO2 96%   BMI 25.52 kg/m    Body mass index is 25.52 kg/m .     Physical Exam     GENERAL: healthy, alert and no distress  SPINE:  No visual deformities, swelling, erythema or skin lesions over the thoracic and lumbar spine  No pain with palpation over spinous processes/musculature over the thoracic and lumbar spine   Sensation intact bilateral lower extremities  Gait is normal  He is able to walk on heels and squat w/out " difficulty  He has some difficulty standing on toes  Weakness on right vs left with dorsiflexion of big toe           Weakness dorsiflexion of left big toe

## 2023-05-18 NOTE — TELEPHONE ENCOUNTER
NEUROSURGERY- NEW PREVISIT PLANNING       Record Status/Location     Referring Provider In chart by Sheri Valderrama PA-C   Diagnosis  Low Back Pain   MRI (HEAD, NECK, SPINE) waiting Yes, with [facility] 05/10/23   CT  no   X-ray  no   INJECTION  no   PHYSICAL THERAPY  no   SURGERY  no

## 2023-05-22 ENCOUNTER — PRE VISIT (OUTPATIENT)
Dept: NEUROSURGERY | Facility: CLINIC | Age: 38
End: 2023-05-22

## 2023-07-31 ENCOUNTER — PATIENT OUTREACH (OUTPATIENT)
Dept: CARE COORDINATION | Facility: CLINIC | Age: 38
End: 2023-07-31
Payer: COMMERCIAL

## 2023-08-14 ENCOUNTER — PATIENT OUTREACH (OUTPATIENT)
Dept: CARE COORDINATION | Facility: CLINIC | Age: 38
End: 2023-08-14
Payer: COMMERCIAL

## 2023-08-29 DIAGNOSIS — E11.9 TYPE 2 DIABETES MELLITUS WITHOUT COMPLICATION, WITHOUT LONG-TERM CURRENT USE OF INSULIN (H): ICD-10-CM

## 2023-08-29 RX ORDER — LISINOPRIL 5 MG/1
TABLET ORAL
Qty: 90 TABLET | Refills: 0 | Status: SHIPPED | OUTPATIENT
Start: 2023-08-29 | End: 2023-11-28

## 2023-09-25 ENCOUNTER — OFFICE VISIT (OUTPATIENT)
Dept: OPHTHALMOLOGY | Facility: CLINIC | Age: 38
End: 2023-09-25
Payer: COMMERCIAL

## 2023-09-25 DIAGNOSIS — H11.153 PINGUECULA OF BOTH EYES: ICD-10-CM

## 2023-09-25 DIAGNOSIS — E11.9 TYPE 2 DIABETES MELLITUS WITHOUT RETINOPATHY (H): Primary | ICD-10-CM

## 2023-09-25 DIAGNOSIS — H25.013 CORTICAL SENILE CATARACT OF BOTH EYES: ICD-10-CM

## 2023-09-25 PROCEDURE — 92014 COMPRE OPH EXAM EST PT 1/>: CPT | Performed by: OPHTHALMOLOGY

## 2023-09-25 ASSESSMENT — VISUAL ACUITY
METHOD: SNELLEN - LINEAR
OD_SC: 20/20
OS_SC+: +3
OS_SC: J1+
OD_SC+: +3
OD_SC: J1+
OS_SC: 20/20

## 2023-09-25 ASSESSMENT — REFRACTION_MANIFEST
OD_CYLINDER: +0.25
OD_SPHERE: PLANO
OD_AXIS: 075
OS_CYLINDER: SPHERE
OS_SPHERE: +0.25

## 2023-09-25 ASSESSMENT — CONF VISUAL FIELD
OS_INFERIOR_TEMPORAL_RESTRICTION: 0
OD_NORMAL: 1
METHOD: COUNTING FINGERS
OS_SUPERIOR_TEMPORAL_RESTRICTION: 0
OD_SUPERIOR_NASAL_RESTRICTION: 0
OS_INFERIOR_NASAL_RESTRICTION: 0
OD_INFERIOR_NASAL_RESTRICTION: 0
OD_SUPERIOR_TEMPORAL_RESTRICTION: 0
OD_INFERIOR_TEMPORAL_RESTRICTION: 0
OS_NORMAL: 1
OS_SUPERIOR_NASAL_RESTRICTION: 0

## 2023-09-25 ASSESSMENT — CUP TO DISC RATIO
OS_RATIO: 0.4
OD_RATIO: 0.4

## 2023-09-25 ASSESSMENT — TONOMETRY
OD_IOP_MMHG: 18
IOP_METHOD: ICARE
OS_IOP_MMHG: 20

## 2023-09-25 ASSESSMENT — SLIT LAMP EXAM - LIDS
COMMENTS: NORMAL
COMMENTS: NORMAL

## 2023-09-25 NOTE — NURSING NOTE
Chief Complaints and History of Present Illnesses   Patient presents with    Diabetic Eye Exam     Chief Complaint(s) and History of Present Illness(es)       Diabetic Eye Exam               Comments    Pt presents to the clinic for his diabetic eye exam. He does not presently wear glasses or contacts. He states that his vision is good and remains unchanged. He specfically denies flashes/floaters each eye.  His blood sugar upon waking this morning was 142. He is scheduled for his next A1C lab in October. 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   Lab Results       Component                Value               Date                       A1C                      8.1                 04/10/2023                 A1C                      7.5                 08/30/2022                 A1C                      7.3                 10/20/2021                 A1C                      7.0                 07/27/2021                 A1C                      10.2                03/09/2021

## 2023-09-25 NOTE — NURSING NOTE
Chief Complaints and History of Present Illnesses   Patient presents with    Diabetic Eye Exam     Chief Complaint(s) and History of Present Illness(es)       Diabetic Eye Exam               Comments    Pt presents to the clinic for his diabetic eye exam. He does not presently wear glasses or contacts. He states that his vision is good and remains unchanged. He specfically denies flashes/floaters each eye.  His blood sugar upon waking this morning was 142. He is scheduled for his next A1C lab in October.   Lab Results       Component                Value               Date                       A1C                      8.1                 04/10/2023                 A1C                      7.5                 08/30/2022                 A1C                      7.3                 10/20/2021                 A1C                      7.0                 07/27/2021                 A1C                      10.2                03/09/2021

## 2023-09-25 NOTE — PROGRESS NOTES
HPI    Pt presents to the clinic for his diabetic eye exam. He does not presently wear glasses or contacts. He states that his vision is good and remains unchanged. He specfically denies flashes/floaters each eye.  His blood sugar upon waking this morning was 142. He is scheduled for his next A1C lab in October. 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   Lab Results       Component                Value               Date                       A1C                      8.1                 04/10/2023                 A1C                      7.5                 08/30/2022                 A1C                      7.3                 10/20/2021                 A1C                      7.0                 07/27/2021                 A1C                      10.2                03/09/2021             Last edited by Hilda Singh on 9/25/2023 10:21 AM.         Review of systems for the eyes was negative other than the pertinent positives/negatives listed in the HPI.      Assessment & Plan    HPI:  Blanca Zee is a 38 year old male with 2 year history of T2DM, HTN, HLD presents for annual dilated fundus exam. No acute complaints today.      POHx: denies  PMHx: T2DM  Current Medications: alcohol swab prep pads, Use to swab area of injection/yazmin as directed.  atorvastatin (LIPITOR) 20 MG tablet, Take 1 tablet (20 mg) by mouth daily  blood glucose (NO BRAND SPECIFIED) test strip, Use to test blood sugar 1 times daily or as directed. To accompany: Blood Glucose Monitor Brands: per insurance.  blood glucose calibration (NO BRAND SPECIFIED) solution, To accompany: Blood Glucose Monitor Brands: per insurance.  blood glucose monitoring (NO BRAND SPECIFIED) meter device kit, Use to test blood sugar 1 times daily or as directed. Preferred blood glucose meter OR supplies to accompany: Blood Glucose Monitor Brands: per  insurance.  cyclobenzaprine (FLEXERIL) 10 MG tablet, Take 1 tablet (10 mg) by mouth 3 times daily as needed for muscle spasms  glipiZIDE (GLUCOTROL XL) 5 MG 24 hr tablet, Take 1 tablet (5 mg) by mouth daily  lisinopril (ZESTRIL) 5 MG tablet, TAKE 1 TABLET(5 MG) BY MOUTH DAILY  metFORMIN (GLUCOPHAGE XR) 500 MG 24 hr tablet, Take 2 tablets (1,000 mg) by mouth 2 times daily (with meals)  thin (NO BRAND SPECIFIED) lancets, Use with lanceting device. To accompany: Blood Glucose Monitor Brands: per insurance.    No current facility-administered medications on file prior to visit.    FHx: denies family history of ocular conditions   PSHx: denies history of ocular surgeries       Current Eye Medications:      Assessment & Plan:  (E11.9) Type 2 diabetes mellitus without retinopathy (H)  (primary encounter diagnosis)  (E11.9) Type 2 diabetes mellitus without complication, without long-term current use of insulin (H)  Diagnosed 2021  Most recent HgBA1c 8.1 on 4/10/23  No background diabetic retinopathy or neovascularization noted on today's exam.  Discussed ocular and systemic benefits of blood pressure and blood sugar control.  Return in 1 year for full exam with dilation or sooner if changes to vision.      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 No treatment currently recommended. The patient will monitor for vision changes and contact us with any decrease in vision. Recheck in one year.     Return in about 1 year (around 9/25/2024) for Annual Visit-v/t/d/MRx.        Emile Awad MD     Attending Physician Attestation:  Complete documentation of historical and exam elements from today's encounter can be found in the full encounter summary report (not reduplicated in this progress note).  I  personally obtained the chief complaint(s) and history of present illness.  I confirmed and edited as necessary the review of systems, past medical/surgical history, family history, social history, and examination findings as documented by others; and I examined the patient myself.  I personally reviewed the relevant tests, images, and reports as documented above.  I formulated and edited as necessary the assessment and plan and discussed the findings and management plan with the patient and family. - Emile Awad MD

## 2023-09-26 ENCOUNTER — TELEPHONE (OUTPATIENT)
Dept: OPHTHALMOLOGY | Facility: CLINIC | Age: 38
End: 2023-09-26
Payer: COMMERCIAL

## 2023-09-26 NOTE — TELEPHONE ENCOUNTER
Health Call Center    Phone Message    May a detailed message be left on voicemail: yes     Reason for Call: Question regarding specialist protocol  Please follow protocols- only utilize this documentation for questions or concerns that are not clear in the protocol.  Contact clinic directly to clarify question(s) via phone or [employer] message.   Was Clinic Available: yes  Question regarding protocol:  Pinguecula of both eyes   Is there a referral for the requested specialist/specialty? yes  Name of referring provider: Dr. Emile Awad  Location of referring provider: [facility]    Action Taken: Message routed to:  [facility]    Travel Screening: Not Applicable

## 2023-09-26 NOTE — TELEPHONE ENCOUNTER
Ok to schedule with cornea/anterior segment provider--Dr. Bell or Dr. Minor.    Note to patient communicator to assist in scheduling    Nick Dyson RN 4:07 PM 09/26/23

## 2023-09-27 ENCOUNTER — TELEPHONE (OUTPATIENT)
Dept: OPHTHALMOLOGY | Facility: CLINIC | Age: 38
End: 2023-09-27
Payer: COMMERCIAL

## 2023-09-27 NOTE — TELEPHONE ENCOUNTER
Called and spoke to Rios with [facility]     Made her an appointment for 10/6 @ 915 am with Dr. Arabella Lucia translated     Discussed     [place] / insurance     Clinic address     Time / date / provider information    Eunice Julian Communication Facilitator on 9/27/2023 at 10:38 AM

## 2023-10-04 ENCOUNTER — OFFICE VISIT (OUTPATIENT)
Dept: FAMILY MEDICINE | Facility: CLINIC | Age: 38
End: 2023-10-04
Payer: COMMERCIAL

## 2023-10-04 VITALS
HEART RATE: 91 BPM | HEIGHT: 72 IN | DIASTOLIC BLOOD PRESSURE: 82 MMHG | BODY MASS INDEX: 26.6 KG/M2 | TEMPERATURE: 97.5 F | SYSTOLIC BLOOD PRESSURE: 138 MMHG | WEIGHT: 196.4 LBS | OXYGEN SATURATION: 99 % | RESPIRATION RATE: 13 BRPM

## 2023-10-04 DIAGNOSIS — G89.29 CHRONIC MIDLINE LOW BACK PAIN WITH RIGHT-SIDED SCIATICA: ICD-10-CM

## 2023-10-04 DIAGNOSIS — E78.5 HYPERLIPIDEMIA LDL GOAL <70: ICD-10-CM

## 2023-10-04 DIAGNOSIS — M54.41 CHRONIC MIDLINE LOW BACK PAIN WITH RIGHT-SIDED SCIATICA: ICD-10-CM

## 2023-10-04 DIAGNOSIS — R53.83 FATIGUE, UNSPECIFIED TYPE: ICD-10-CM

## 2023-10-04 DIAGNOSIS — Z00.00 ROUTINE GENERAL MEDICAL EXAMINATION AT A HEALTH CARE FACILITY: Primary | ICD-10-CM

## 2023-10-04 DIAGNOSIS — E11.9 TYPE 2 DIABETES MELLITUS WITHOUT COMPLICATION, WITHOUT LONG-TERM CURRENT USE OF INSULIN (H): ICD-10-CM

## 2023-10-04 DIAGNOSIS — Z23 NEED FOR VACCINATION: ICD-10-CM

## 2023-10-04 DIAGNOSIS — M48.062 SPINAL STENOSIS OF LUMBAR REGION WITH NEUROGENIC CLAUDICATION: ICD-10-CM

## 2023-10-04 DIAGNOSIS — Z13.9 ENCOUNTER FOR SCREENING INVOLVING SOCIAL DETERMINANTS OF HEALTH (SDOH): ICD-10-CM

## 2023-10-04 DIAGNOSIS — Z13.228 ENCOUNTER FOR SCREENING FOR METABOLIC DISORDER: ICD-10-CM

## 2023-10-04 DIAGNOSIS — E11.9 TYPE 2 DIABETES MELLITUS WITHOUT RETINOPATHY (H): ICD-10-CM

## 2023-10-04 DIAGNOSIS — E55.9 VITAMIN D DEFICIENCY: ICD-10-CM

## 2023-10-04 LAB
ERYTHROCYTE [DISTWIDTH] IN BLOOD BY AUTOMATED COUNT: 12.6 % (ref 10–15)
HBA1C MFR BLD: 7.7 % (ref 0–5.6)
HCT VFR BLD AUTO: 47.6 % (ref 40–53)
HGB BLD-MCNC: 15.7 G/DL (ref 13.3–17.7)
MCH RBC QN AUTO: 26.5 PG (ref 26.5–33)
MCHC RBC AUTO-ENTMCNC: 33 G/DL (ref 31.5–36.5)
MCV RBC AUTO: 80 FL (ref 78–100)
PLATELET # BLD AUTO: 311 10E3/UL (ref 150–450)
RBC # BLD AUTO: 5.92 10E6/UL (ref 4.4–5.9)
WBC # BLD AUTO: 5.7 10E3/UL (ref 4–11)

## 2023-10-04 PROCEDURE — 90686 IIV4 VACC NO PRSV 0.5 ML IM: CPT | Performed by: INTERNAL MEDICINE

## 2023-10-04 PROCEDURE — 36415 COLL VENOUS BLD VENIPUNCTURE: CPT | Performed by: INTERNAL MEDICINE

## 2023-10-04 PROCEDURE — 82570 ASSAY OF URINE CREATININE: CPT | Performed by: INTERNAL MEDICINE

## 2023-10-04 PROCEDURE — 82043 UR ALBUMIN QUANTITATIVE: CPT | Performed by: INTERNAL MEDICINE

## 2023-10-04 PROCEDURE — 90480 ADMN SARSCOV2 VAC 1/ONLY CMP: CPT | Performed by: INTERNAL MEDICINE

## 2023-10-04 PROCEDURE — 91320 SARSCV2 VAC 30MCG TRS-SUC IM: CPT | Performed by: INTERNAL MEDICINE

## 2023-10-04 PROCEDURE — 80061 LIPID PANEL: CPT | Performed by: INTERNAL MEDICINE

## 2023-10-04 PROCEDURE — 85027 COMPLETE CBC AUTOMATED: CPT | Performed by: INTERNAL MEDICINE

## 2023-10-04 PROCEDURE — 83036 HEMOGLOBIN GLYCOSYLATED A1C: CPT | Performed by: INTERNAL MEDICINE

## 2023-10-04 PROCEDURE — 82306 VITAMIN D 25 HYDROXY: CPT | Performed by: INTERNAL MEDICINE

## 2023-10-04 PROCEDURE — 80053 COMPREHEN METABOLIC PANEL: CPT | Performed by: INTERNAL MEDICINE

## 2023-10-04 PROCEDURE — 99395 PREV VISIT EST AGE 18-39: CPT | Mod: 25 | Performed by: INTERNAL MEDICINE

## 2023-10-04 PROCEDURE — 99214 OFFICE O/P EST MOD 30 MIN: CPT | Mod: 25 | Performed by: INTERNAL MEDICINE

## 2023-10-04 PROCEDURE — 90677 PCV20 VACCINE IM: CPT | Performed by: INTERNAL MEDICINE

## 2023-10-04 PROCEDURE — 90472 IMMUNIZATION ADMIN EACH ADD: CPT | Performed by: INTERNAL MEDICINE

## 2023-10-04 PROCEDURE — 90471 IMMUNIZATION ADMIN: CPT | Performed by: INTERNAL MEDICINE

## 2023-10-04 RX ORDER — CYCLOBENZAPRINE HCL 10 MG
10 TABLET ORAL 3 TIMES DAILY PRN
Qty: 30 TABLET | Refills: 2 | Status: SHIPPED | OUTPATIENT
Start: 2023-10-04

## 2023-10-04 RX ORDER — MELOXICAM 15 MG/1
TABLET ORAL
COMMUNITY
Start: 2023-05-08 | End: 2023-10-04

## 2023-10-04 RX ORDER — GLIPIZIDE 5 MG/1
5 TABLET, FILM COATED, EXTENDED RELEASE ORAL DAILY
Qty: 90 TABLET | Refills: 1 | Status: SHIPPED | OUTPATIENT
Start: 2023-10-04 | End: 2023-10-05

## 2023-10-04 RX ORDER — GABAPENTIN 100 MG/1
100 CAPSULE ORAL 3 TIMES DAILY
Qty: 90 CAPSULE | Refills: 1 | Status: SHIPPED | OUTPATIENT
Start: 2023-10-04

## 2023-10-04 RX ORDER — MELOXICAM 15 MG/1
TABLET ORAL
Qty: 30 TABLET | Refills: 1 | Status: SHIPPED | OUTPATIENT
Start: 2023-10-04

## 2023-10-04 ASSESSMENT — PAIN SCALES - GENERAL: PAINLEVEL: NO PAIN (0)

## 2023-10-04 NOTE — PATIENT INSTRUCTIONS
As discussed, please do fasting labs placed.     Will do refills of your medications     Started on gabapentin for your Sciatica pain as scheduled basis 3x/day.   OK to take as needed muscle relaxor and meloxicam for flare ups.     Please call the Spine specialist referral placed already in 5/2023 for your Spinal stenosis causing your back pain  [facility] will call you to coordinate your care as prescribed by your provider. If you don't hear from a representative within 2 business days, please call [phone].     ============================    Preventive Health Recommendations  Male Ages 26 - 39    Yearly exam:             See your health care provider every year in order to  o   Review health changes.   o   Discuss preventive care.    o   Review your medicines if your doctor has prescribed any.  You should be tested each year for STDs (sexually transmitted diseases), if you re at risk.   After age 35, talk to your provider about cholesterol testing. If you are at risk for heart disease, have your cholesterol tested at least every 5 years.   If you are at risk for diabetes, you should have a diabetes test (fasting glucose).  Shots: Get a flu shot each year. Get a tetanus shot every 10 years.     Nutrition:  Eat at least 5 servings of fruits and vegetables daily.   Eat whole-grain bread, whole-wheat pasta and brown rice instead of white grains and rice.   Get adequate Calcium and Vitamin D.     Lifestyle  Exercise for at least 150 minutes a week (30 minutes a day, 5 days a week). This will help you control your weight and prevent disease.   Limit alcohol to one drink per day.   No smoking.   Wear sunscreen to prevent skin cancer.   See your dentist every six months for an exam and cleaning.

## 2023-10-04 NOTE — COMMUNITY RESOURCES LIST (ENGLISH)
10/04/2023   [facility]  N/A  For questions about this resource list or additional care needs, please contact your primary care clinic or care manager.  Phone: [phone]   Email: N/A   Address: [facility] [place] [address]   Hours: N/A        Hotlines and Helplines       Hotline - Housing crisis  1  [facility] (Main Office) - Emergency Services Distance: 9.43 miles      Phone/Virtual   [address] [place] [address]  Language: English  Hours: Mon - Sun Open 24 Hours   Phone: (995) 356-1647 Email: info@"[employer]Weft Website: http://"[employer]".Weft     2  [facility] Distance: 10.68 miles      Phone/Virtual   [address] [place] [address]  Language: English  Hours: Mon - Sun Open 24 Hours   Phone: (214) 851-1054 Email: info@"[employer]ft Website: http://www."[employer]".org          Housing       Coordinated Entry access point  3  [facility] - Housing Services Distance: 11.77 miles      In-Person   [address] [place] [address]  Language: English  Hours: Mon - Fri 9:00 AM - 5:00 PM  Fees: Free   Phone: (988) 922-5073 Email: housing@[facility].org Website: http://www.[facility].org/housing     4  [facility] (ASC) Distance: 12.06 miles      In-Person, Phone/Virtual   [address] [place] [address]  Language: English, Hungarian  Hours: Mon - Fri 10:00 AM - 5:30 PM , Mon - Sun 7:30 PM - 10:20 PM , Sat - Sun 1:00 PM - 5:30 PM  Fees: Free   Phone: (824) 817-2613 Email: info@[employer].org Website: https://www.[employer].org/our-programs/adult-shelter-connect-solares-shelter/     Drop-in center or day shelter  5  [facility] Distance: 11.95 miles      In-Person   [address] [place] [address]  Language: English  Hours: Mon - Fri 12:00 PM - 3:00 PM  Fees: Free   Phone: (883) 467-2577 Email: torres@[employer].Playdate App Website:  http://[facility].org/     6  [facility] [place] [facility] [place] - [facility] Distance: 12.23 miles      In-Person   [address] [place] [address]  Language: English, Colombian, German  Hours: Mon - Sat 7:00 AM - 3:00 PM  Fees: Free, Self Pay   Phone: (964) 157-4137 Email: info@[employer] Website: https://www.[employer]/locations/opportunity-center/     Housing search assistance  7  [place] [employer] - Rental Homes for Future Homebuyers Program Distance: 7.58 miles      Phone/Virtual   [address] [place] [address]  Language: English  Hours: Mon - Fri 8:00 AM - 4:30 PM  Fees: Free   Phone: (800) 842-5032 Email: hra@[facility] Website: https://www.[facility]/hra/Comfort-housing-and-cpvxetdgaxomv-wnbxbepom-clo     8  [facility] Distance: 8.41 miles      In-Person   [address] [place] [address]  Language: English  Hours: Mon - Fri 8:30 AM - 4:00 PM  Fees: Free   Phone: (140) 404-3448 Email: reception@[facility].[employer] Website: http://www.[facility].org     Shelter for individuals  9  [facility] Distance: 11.98 miles      In-Person   [address] [place] [address]  Language: English  Hours: Mon - Sun Open 24 Hours  Fees: Free   Phone: (378) 359-7067 Email: communications@[employer].org Website: https://oscs-[place].org/oursaviourshousing/     10  [facility] and [facility] - [facility] Ground halfway - Duck Distance: 12.02 miles      In-Person   [address] [place] [address]  Language: English  Hours: Mon - Sun 5:00 PM - 10:00 AM  Fees: Free, Self Pay   Phone: (489) 602-4321 Email: info@[employer] Website: https://www.cctwincities.org/locations/higher-ground-shelter/          Important Numbers & Websites       Emergency Services   [address]  [place] [address]  Poison Control   (760) 484-6709  Suicide Prevention Lifeline    (103) 929-5719 (TALK)  Child Abuse Hotline   (572) 826-5661 (4-A-Child)  Sexual Assault Hotline   (225) 367-7162 (HOPE)  National Runaway Safeline   (967) 379-9654 (RUNAWAY)  All-Options Talkline   (663) 979-3399  Substance Abuse Referral   (755) 189-9614 (HELP)

## 2023-10-04 NOTE — PROGRESS NOTES
SUBJECTIVE:   CC: Blanca is an 38 year old who presents for preventative health visit.       10/4/2023     1:49 PM   Additional Questions   Roomed by EMIL       Healthy Habits:     Getting at least 3 servings of Calcium per day:  Yes    Bi-annual eye exam:  Yes    Dental care twice a year:  NO    Sleep apnea or symptoms of sleep apnea:  None    Diet:  Other    Frequency of exercise:  2-3 days/week    Duration of exercise:  15-30 minutes    Taking medications regularly:  Yes    Additional concerns today:  No      Social History     Tobacco Use    Smoking status: Never    Smokeless tobacco: Never   Substance Use Topics    Alcohol use: Not Currently             10/4/2023     1:42 PM   Alcohol Use   Prescreen: >3 drinks/day or >7 drinks/week? No          No data to display                Last PSA: No results found for: PSA    Reviewed orders with patient. Reviewed health maintenance and updated orders accordingly - Yes  Lab work is in process  Labs reviewed in EPIC    Reviewed and updated as needed this visit by clinical staff   Tobacco  Allergies  Meds  Problems  Med Hx  Surg Hx  Fam Hx          Reviewed and updated as needed this visit by Provider   Tobacco  Allergies  Meds  Problems  Med Hx  Surg Hx  Fam Hx         Past Medical History:   Diagnosis Date    Diabetes (H)     High cholesterol     Hypertension       History reviewed. No pertinent surgical history.    Review of Systems   Genitourinary:  Negative for impotence.     CONSTITUTIONAL: NEGATIVE for fever, chills, change in weight  INTEGUMENTARY/SKIN: NEGATIVE for worrisome rashes, moles or lesions  EYES: NEGATIVE for vision changes or irritation  ENT: NEGATIVE for ear, mouth and throat problems  RESP: NEGATIVE for significant cough or SOB  CV: NEGATIVE for chest pain, palpitations or peripheral edema  GI: NEGATIVE for nausea, abdominal pain, heartburn, or change in bowel habits   male: negative for dysuria, hematuria, decreased urinary stream,  erectile dysfunction, urethral discharge  MUSCULOSKELETAL: NEGATIVE for significant arthralgias or myalgia  NEURO: NEGATIVE for weakness, dizziness or paresthesias  PSYCHIATRIC: NEGATIVE for changes in mood or affect    OBJECTIVE:   /82   Pulse 91   Temp 97.5  F (36.4  C) (Tympanic)   Resp 13   Ht 1.829 m (6')   Wt 89.1 kg (196 lb 6.4 oz)   SpO2 99%   BMI 26.64 kg/m      Physical Exam  GENERAL: healthy, alert and no distress  EYES: Eyes grossly normal to inspection, PERRL and conjunctivae and sclerae normal  HENT: ear canals and TM's normal, nose and mouth without ulcers or lesions  NECK: no adenopathy, no asymmetry, masses, or scars and thyroid normal to palpation  RESP: lungs clear to auscultation - no rales, rhonchi or wheezes  CV: regular rate and rhythm, normal S1 S2, no S3 or S4, no murmur, click or rub, no peripheral edema and peripheral pulses strong  ABDOMEN: soft, nontender, no hepatosplenomegaly, no masses and bowel sounds normal  MS: no gross musculoskeletal defects noted, no edema  SKIN: no suspicious lesions or rashes  NEURO: Normal strength and tone, mentation intact and speech normal  PSYCH: mentation appears normal, affect normal/bright    Diagnostic Test Results:  Labs reviewed in Epic    ASSESSMENT/PLAN:       Assessment and Plan  1. Routine general medical examination at a health care facility  2. Encounter for screening involving social determinants of health (SDoH)  Last seen pt on 3/2023 for Diabetes follow up with A1c remaining uncontrolled at 8.1% in spite of current medications metformin 1000 mg twice daily and glipizide 5 mg daily. Pt is here for Annual physical.   - Pneumococcal 20 Valent Conjugate (Prevnar 20)  - HEMOGLOBIN A1C; Future  - Lipid panel reflex to direct LDL Non-fasting; Future  - Albumin Random Urine Quantitative with Creat Ratio; Future  - INFLUENZA VACCINE IM > 6 MONTHS VALENT IIV4 (AFLURIA/FLUZONE)  - COVID-19 12+ (2023-24) (PFIZER)  - glipiZIDE (GLUCOTROL  XL) 5 MG 24 hr tablet; Take 1 tablet (5 mg) by mouth daily  Dispense: 90 tablet; Refill: 1  - Comprehensive metabolic panel (BMP + Alb, Alk Phos, ALT, AST, Total. Bili, TP); Future  - CBC with platelets; Future  - cyclobenzaprine (FLEXERIL) 10 MG tablet; Take 1 tablet (10 mg) by mouth 3 times daily as needed for muscle spasms  Dispense: 30 tablet; Refill: 2  - meloxicam (MOBIC) 15 MG tablet; Take 1 tablet daily as needed for back pain  Dispense: 30 tablet; Refill: 1  - Vitamin D Deficiency; Future  - HEMOGLOBIN A1C  - Lipid panel reflex to direct LDL Non-fasting  - Albumin Random Urine Quantitative with Creat Ratio  - Comprehensive metabolic panel (BMP + Alb, Alk Phos, ALT, AST, Total. Bili, TP)  - CBC with platelets  - Vitamin D Deficiency    3. Type 2 diabetes mellitus without retinopathy (H)  \Improving though remaining Uncontrolled. Will increase the dose of Glipizide to 10 mg daily instead of current 5 mg daily.   - HEMOGLOBIN A1C; Future  - Lipid panel reflex to direct LDL Non-fasting; Future  - Albumin Random Urine Quantitative with Creat Ratio; Future  - HEMOGLOBIN A1C  - Lipid panel reflex to direct LDL Non-fasting  - Albumin Random Urine Quantitative with Creat Ratio  - glipiZIDE (GLUCOTROL XL) 10 MG 24 hr tablet; Take 1 tablet (10 mg) by mouth daily  Dispense: 90 tablet; Refill: 1    4. Chronic midline low back pain with right-sided sciatica  5. Spinal stenosis of lumbar region with neurogenic claudication  Ongoing chronic low back pain with right-sided sciatica.  Given patient's past MRI results which is positive for severe spinal stenosis discussed on needing epidural which is only way to treat this problem.  Patient has been given referral to spine specialist in the past provider visits, I went ahead and given the contact information in his AVS below so that he can schedule appointment with them.  Patient understood agreed with the plan.  -Started on Neurontin as his current Flexeril and meloxicam is  not completely relieving his sciatica.  Patient understood and agreed with the plan.  - cyclobenzaprine (FLEXERIL) 10 MG tablet; Take 1 tablet (10 mg) by mouth 3 times daily as needed for muscle spasms  Dispense: 30 tablet; Refill: 2  - meloxicam (MOBIC) 15 MG tablet; Take 1 tablet daily as needed for back pain  Dispense: 30 tablet; Refill: 1  - gabapentin (NEURONTIN) 100 MG capsule; Take 1 capsule (100 mg) by mouth 3 times daily  Dispense: 90 capsule; Refill: 1    6. Hyperlipidemia LDL goal <70  - Lipid panel reflex to direct LDL Non-fasting; Future  - Lipid panel reflex to direct LDL Non-fasting    7. Encounter for screening for metabolic disorder  - CBC with platelets; Future  - Vitamin D Deficiency; Future  - CBC with platelets  - Vitamin D Deficiency    8. Fatigue, unspecified type  - Vitamin D Deficiency; Future  - Vitamin D Deficiency    9. Need for vaccination  - Pneumococcal 20 Valent Conjugate (Prevnar 20)  - INFLUENZA VACCINE IM > 6 MONTHS VALENT IIV4 (AFLURIA/FLUZONE)  - COVID-19 12+ (2023-24) (PFIZER)    11. Vitamin D deficiency  New problem, sent in high dose medication to pharmacy.  - Vitamin D deficiency screening; Future           Please note that this note consists of symbols derived from keyboarding, dictation and/or voice recognition software. As a result, there may be errors in the script that have gone undetected. Please consider this when interpreting information found in this chart.    Patient Instructions   As discussed, please do fasting labs placed.     Will do refills of your medications     Started on gabapentin for your Sciatica pain as scheduled basis 3x/day.   OK to take as needed muscle relaxor and meloxicam for flare ups.     Please call the Spine specialist referral placed already in 5/2023 for your Spinal stenosis causing your back pain  [facility] will call you to coordinate your care as prescribed by your provider. If you don't hear from a representative within 2 business  days, please call [phone].     ============================    Preventive Health Recommendations  Male Ages 26 - 39    Yearly exam:             See your health care provider every year in order to  o   Review health changes.   o   Discuss preventive care.    o   Review your medicines if your doctor has prescribed any.  You should be tested each year for STDs (sexually transmitted diseases), if you re at risk.   After age 35, talk to your provider about cholesterol testing. If you are at risk for heart disease, have your cholesterol tested at least every 5 years.   If you are at risk for diabetes, you should have a diabetes test (fasting glucose).  Shots: Get a flu shot each year. Get a tetanus shot every 10 years.     Nutrition:  Eat at least 5 servings of fruits and vegetables daily.   Eat whole-grain bread, whole-wheat pasta and brown rice instead of white grains and rice.   Get adequate Calcium and Vitamin D.     Lifestyle  Exercise for at least 150 minutes a week (30 minutes a day, 5 days a week). This will help you control your weight and prevent disease.   Limit alcohol to one drink per day.   No smoking.   Wear sunscreen to prevent skin cancer.   See your dentist every six months for an exam and cleaning.     Return in about 6 months (around 4/4/2024), or if symptoms worsen or fail to improve, for diabetes, video visit.    Melissa Lam MD  [facility]        Patient has been advised of split billing requirements and indicates understanding: Yes      COUNSELING:   Reviewed preventive health counseling, as reflected in patient instructions  Special attention given to:        Regular exercise       Healthy diet/nutrition       Vision screening       Hearing screening       Pneumococcal Vaccine          Immunizations  Vaccinated for: Covid-19, Influenza, and Pneumococcal           Alcohol Use       BMI:   Estimated body mass index is 26.64 kg/m  as calculated from the  following:    Height as of this encounter: 1.829 m (6').    Weight as of this encounter: 89.1 kg (196 lb 6.4 oz).   Weight management plan: Discussed healthy diet and exercise guidelines      He reports that he has never smoked. He has never used smokeless tobacco.            Melissa Lam MD  [facility]

## 2023-10-04 NOTE — COMMUNITY RESOURCES LIST (ENGLISH)
10/04/2023   [facility]  N/A  For questions about this resource list or additional care needs, please contact your primary care clinic or care manager.  Phone: [phone]   Email: N/A   Address: [facility] [place] [address]   Hours: N/A        Hotlines and Helplines       Hotline - Housing crisis  1  [facility] (Main Office) - Emergency Services Distance: 9.43 miles      Phone/Virtual   [address] [place] [address]  Language: English  Hours: Mon - Sun Open 24 Hours   Phone: (575) 199-8002 Email: info@[employer].Roundrate Website: http://[employer].Roundrate     2  [facility] Distance: 10.68 miles      Phone/Virtual   [address] [place] [address]  Language: English  Hours: Mon - Sun Open 24 Hours   Phone: (122) 346-6487 Email: info@[employer] Website: http://www.[employer].org          Housing       Coordinated Entry access point  3  [facility] - Housing Services Distance: 11.77 miles      In-Person   [address] [place] [address]  Language: English  Hours: Mon - Fri 9:00 AM - 5:00 PM  Fees: Free   Phone: (362) 208-6306 Email: housing@[facility].org Website: http://www.[facility].org/housing     4  [facility]ult [facility] Connect (ASC) Distance: 12.06 miles      In-Person, Phone/Virtual   [address] [place] [address]  Language: English, Japanese  Hours: Mon - Fri 10:00 AM - 5:30 PM , Mon - Sun 7:30 PM - 10:20 PM , Sat - Sun 1:00 PM - 5:30 PM  Fees: Free   Phone: (620) 100-8659 Email: info@[employer].org Website: https://www.[employer].org/our-programs/adult-shelter-connect-solares-shelter/     Drop-in center or day shelter  5  [facility] Distance: 11.95 miles      In-Person   [address] [place] [address]  Language: English  Hours: Mon - Fri 12:00 PM - 3:00 PM  Fees: Free   Phone: (285) 304-2674 Email: torres@[employer].Q[employer]odio Website:  http://[facility].org/     6  [employer] [place] [facility] [place] [facility] Distance: 12.23 miles      In-Person   [address] [place] [address]  Language: English, Bangladeshi, Wolof  Hours: Mon - Sat 7:00 AM - 3:00 PM  Fees: Free, Self Pay   Phone: (150) 734-5072 Email: info@[employer] Website: https://www.[facility]/locations/opportunity-center/     Housing search assistance  7  [place] [employer] - Rental Homes for Future Homebuyers Program Distance: 7.58 miles      Phone/Virtual   [address] [place] [address]  Language: English  Hours: Mon - Fri 8:00 AM - 4:30 PM  Fees: Free   Phone: (156) 211-8480 Email: hra@[facility] Website: https://www.[facility]/hra/[place]-housing-and-zscsoqrujdhpt-oqsiknans-okc     8  [facility] Distance: 8.41 miles      In-Person   [address] [place] [address]  Language: English  Hours: Mon - Fri 8:30 AM - 4:00 PM  Fees: Free   Phone: (154) 929-6342 Email: reception@[facility] Website: http://www.[facility].org     Shelter for individuals  9  [facility] Distance: 11.98 miles      In-Person   [address] [place] [address]  Language: English  Hours: Mon - Sun Open 24 Hours  Fees: Free   Phone: (317) 483-4457 Email: communications@[facility].org Website: https://oscs-[place].org/oursaviourshousing/     10  [facility] [place] - [facility] [place] Distance: 12.02 miles      In-Person   [address] [place] [address]  Language: English  Hours: Mon - Sun 5:00 PM - 10:00 AM  Fees: Free, Self Pay   Phone: (783) 608-4827 Email: info@[employer] Website: https://www.cctwincities.org/locations/higher-ground-shelter/          Important Numbers & Websites       Emergency Services   [address]  [place] [address]  Poison Control   (262) 636-3204  Suicide Prevention Lifeline    (484) 145-9847 (TALK)  Child Abuse Hotline   (120) 124-4033 (4-A-Child)  Sexual Assault Hotline   (283) 696-2922 (HOPE)  National Runaway Safeline   (793) 726-9259 (RUNAWAY)  All-Options Talkline   (358) 324-5092  Substance Abuse Referral   (835) 520-5515 (HELP)

## 2023-10-04 NOTE — COMMUNITY RESOURCES LIST (PATIENT PREFERRED LANGUAGE)
10/04/2023   [facility]  N/A  Hallmanmarek gifty jimenez , gilma carreon  mamtaa gillian tyler.  Phone: [phone]   Email: N/A   Address: [facility] [place] [address]   Hours: N/A        Mayur Benavides - Dhibaatada guriyeynta  1  [facility] (Xafiiska Wejessica) - Adeegyada Degdegga  Fogaanta: 8.41 miles      Telefoon/Virtual   [address] [place] [address]  Luuqad: Kaylanis  Kennethdaha: Isniinta - Axad [address] [facility]   Phone: (326) [phone] Email: info@[facility].org Website: http://[facility].org     2  [facility] Fogaanta: 9.43 miles      Telefoon/Virtual   [address] [place] [address]  Luuqad: Ingiriis  Saacadaha: Isniinta - Axad [address] [facility]   Phone: (969) [phone] Email: info@[facility].org Website: http://www.[facility].org          Vaishnavi melton [facility] Hospital[facility]el[facility]  3  Diego Torres [facility]) - Adeegyada Guriyeynta Fogaanta: 10.68 miles      [facility] aha   [address] [place] [address]  Luuqad: Kaylanis  Rickyha: Isniinta - Jimyazmin 9:00 AM - 5:00 PM  Devin: Pratik   Phone: (314) 118-2255 Email: housing@[facility].org Website: http://www.[facility].org/housing     4  Lynnette Cheng (ASC) Fogaanta: 12.02 miles      [facility] yue, Telefoon/Virtual   [address] [place] [address]  Helenujuanad: Rahul Fariasdaha: Isniinta - Jimce 10:00 AM - 5:30 PM , Isniinta - Axad 7:30 PM - 10:20 PM , Sabti - Axad 1:00 PM - 5:30 PM  Devin: Pratik   Phone: ([phone]) [phone] Email: info@[facility].org Website: https://www.[facility].org/our-programs/adult-shelter-connect-solares-shelter/     Ayah jefferson  5  Antwan Swartz Fogaanta: 11.77 miles      [facility] - Valdesechoco   [address] [place] [address]  Luuqad: Jarrett Cooperdaha: Isniinta - Jimce 12:00 PM - 3:00 PM  Kharashyada: Bilaash   Phone: (995) 133-1412 Email: michelleLikewise Softwarecommunity@[employer] Website: http://GitHub.org/     6  [facility] Kat[facility] e[facility] - [facility] access hospital Fursadaha Fogaanta: 12.06 miles      Qof ahaan   [address] [place] [address]  Luuqad: Rahul Farias, Soomaali  Saacadaha: Isniinta - Sabti 7:00 AM - 3:00 PM  Kharashyada: BilaaHerve matamoros   Phone: (200) [phone] Email: info@[employer] Website: https://www.[employer].org/locations/opportunity-center/     Bao palmer  7  [facility] ee Barnaamijka Iibsadayaasha Mustaqsanta Fogaanta: 7.58 miles      Telefoon/Virtual   [address] [place] [address]  Luuqad: Jarrett Cooperdaha: Isniinta - Jimce 8:00 AM - 4:30 PM  Kharashyada: Bilaash   Phone: (114) [phone] Email: hra@[facility] Website: https://www.[facility].gov/hra/[place]-housing-and-ajpalbzjxenju-jgkzcurxu-rsp     8  Mary Anne Batesumarkaidena Bulshada ee [place] Fogaanta: 12.23 miles      Qof ahaan   [address] [place] [address]  Luuqad: Jarrett Elmorecadaha: Isniinta - Jimce 8:30 AM - 4:00 PM  Kharashyada: Bilaash   Phone: ([phone] Email: reception@[employer].org Website: http://www.carvercda.org     Abbey henningkhsiyaadka  9  Hoyga Badbaadiyahayaga Fogaanta: 11.95 miles      [facility]   [address] [place] [address]  Luuqad: Jarrett Gonzalez: Alicjaa - Axad [address] [facility]  Manda: Pratik   Phone: (690) [phone] Email: communications@[facility]s-[place].org Website: https://oscs-[place].org/oursaviourshousing/     10  [facility] - fredy Gonzalez [facility] Fogaanta: 11.98 miles      [facility]   [address] [place] [address]  Luuqad: Jarrett Gonzalez: Alicjaa - Axad 5:00 PM - 10:00 AM   Devin: FredzanesaturninoHerve   Phone: (377) [phone] Email: info@[employer] Website: https://www.[employer].org/locations/higher-ground-shelter/          Gloria alvarez & Umesh Petersen   [address]  Miguelina German   311  Jimmy Huggins   [phone]  Lifeline  anais olivo   ([phone] [phone] (TALK)  Mayur alvarez  Ashlee Vazquez   [phone] (4-A-Child)  Mayur rizzo Galcassidy   ([phone] (HOPE)  National Runaway Safeline   ([phone] 207-8265 (RUNAWAY)  Mayur Moss   [phone]  Colin Tong   [phone] (HELP)

## 2023-10-05 ENCOUNTER — TELEPHONE (OUTPATIENT)
Dept: FAMILY MEDICINE | Facility: CLINIC | Age: 38
End: 2023-10-05
Payer: COMMERCIAL

## 2023-10-05 LAB
ALBUMIN SERPL BCG-MCNC: 4.6 G/DL (ref 3.5–5.2)
ALP SERPL-CCNC: 135 U/L (ref 40–129)
ALT SERPL W P-5'-P-CCNC: 24 U/L (ref 0–70)
ANION GAP SERPL CALCULATED.3IONS-SCNC: 12 MMOL/L (ref 7–15)
AST SERPL W P-5'-P-CCNC: 24 U/L (ref 0–45)
BILIRUB SERPL-MCNC: 0.5 MG/DL
BUN SERPL-MCNC: 7.4 MG/DL (ref 6–20)
CALCIUM SERPL-MCNC: 9.8 MG/DL (ref 8.6–10)
CHLORIDE SERPL-SCNC: 101 MMOL/L (ref 98–107)
CHOLEST SERPL-MCNC: 126 MG/DL
CREAT SERPL-MCNC: 0.91 MG/DL (ref 0.67–1.17)
CREAT UR-MCNC: 262 MG/DL
DEPRECATED HCO3 PLAS-SCNC: 27 MMOL/L (ref 22–29)
EGFRCR SERPLBLD CKD-EPI 2021: >90 ML/MIN/1.73M2
GLUCOSE SERPL-MCNC: 135 MG/DL (ref 70–99)
HDLC SERPL-MCNC: 30 MG/DL
LDLC SERPL CALC-MCNC: 72 MG/DL
MICROALBUMIN UR-MCNC: <12 MG/L
MICROALBUMIN/CREAT UR: NORMAL MG/G{CREAT}
NONHDLC SERPL-MCNC: 96 MG/DL
POTASSIUM SERPL-SCNC: 4.1 MMOL/L (ref 3.4–5.3)
PROT SERPL-MCNC: 7.7 G/DL (ref 6.4–8.3)
SODIUM SERPL-SCNC: 140 MMOL/L (ref 135–145)
TRIGL SERPL-MCNC: 122 MG/DL
VIT D+METAB SERPL-MCNC: 15 NG/ML (ref 20–50)

## 2023-10-05 RX ORDER — GLIPIZIDE 10 MG/1
10 TABLET, FILM COATED, EXTENDED RELEASE ORAL DAILY
Qty: 90 TABLET | Refills: 1 | Status: SHIPPED | OUTPATIENT
Start: 2023-10-05 | End: 2024-05-23

## 2023-10-05 RX ORDER — METFORMIN HCL 500 MG
1000 TABLET, EXTENDED RELEASE 24 HR ORAL 2 TIMES DAILY WITH MEALS
Qty: 360 TABLET | Refills: 1 | Status: SHIPPED | OUTPATIENT
Start: 2023-10-05 | End: 2024-05-29

## 2023-10-05 NOTE — TELEPHONE ENCOUNTER
Called and spoke to the patient, with the help of a Israeli . Relayed message from the provider below. Patient expressed understanding and had no additional questions at this time.     Jenise Silva RN

## 2023-10-05 NOTE — TELEPHONE ENCOUNTER
----- Message from Melissa Lam MD sent at 10/5/2023 12:13 AM CDT -----  Your Diabetes levels are mildly improved compared to the past though still remaining high and not at goal given your age. Will increase the dose of your Glipizide to 10 mg from your current 5 mg . Continue Metformin at the same dose.     Thank you,  Melissa Lam MD on 10/5/2023

## 2023-10-13 NOTE — COMMUNITY RESOURCES LIST (PATIENT PREFERRED LANGUAGE)
10/04/2023   [facility]  N/A  Hallman[place]k gifty jimenez , gilma carreon  mamtaa gillian tyler.  Phone: [phone]   Email: N/A   Address: [facility] [place] [address]   Hours: N/A        Mayur Benavides - Dhibaatada guriyeynta  1  [facility] (Xafiiska Wejessica) - Adeegyada Degdegga  [place]: 9.43 miles      Telefoon/Virtual   [address] [place] [address]  Luuqad: Kaylanis  Rickyha: Isniinta - Axad Furan [address] [facility]   Phone: [phone] Email: info@[email or web address] Website: [email or web address]     2  [facility] [place]: 10.68 miles      Telefoon/Virtual   [address] [place] [address]  Luuqad: Ingiriis  Saacadaha: Isniinta - Axad F[address] [facility]   Phone: [phone] Email: info@[facility].org Website: http://www.[facility].org          Vaishnavi melton [facility]  3  Diego Torres [facility]) - Adeegyada Guriyeynta [place]: 11.77 miles      [facility]   [address] [place] [address]  Luuqad: Kaylanis  Rickyha: Isniinta - Jimyazmin 9:00 AM - 5:00 PM  Devin: Pratik   Phone: [phone] Email: housing@[facility].org Website: http://www.[facility].org/housing     4  Lynnette Cheng ([facility]) [place]: 12.06 miles      Qof ahachoco, Telefoon/Virtual   [address] [place] [address]  Helenujuanad: Rahul Fariasdaha: Isniinta - Jimce 10:00 AM - 5:30 PM , Isniinta - Axad 7:30 PM - 10:20 PM , Sabti - Axad 1:00 PM - 5:30 PM  Devin: Pratik   Phone: [phone] Email: info@[facility].org Website: https://www.[facility].org/our-programs/adult-shelter-connect-solares-shelter/     Ayah jefferson  5  Antwan Swartz [place]: 11.95 miles      [facility]   [address] [place] [address]  Luuqad: Jarrett Cooperdaha: Isniinta - Jimce 12:00 PM - 3:00 PM  Kharashyada: Bilaash   Phone: [phone] Email: [email or web address] Website: [email or web address]://[email or web address]/     6  [address] [facility] e[facility] - Xarunt Fursadaha [place]: 12.23 miles      Qof ahaan   [address] [place] [address]  Luuqad: Rahul Farisa, Soomaali  Saacadaha: Isniinta - Sabti 7:00 AM - 3:00 PM  Kharashyada: BilaaHerve matamoros   Phone: [phone] Email: info@[facility].[employer] Website: https://www.[facility].org/locations/opportunity-center/     Bao palmer  7  [facility] ee Barnaamijka Iibsadayaasha Mustaqsanta [place]: 7.58 miles      Telefoon/Virtual   [address] [place] [address]  Luuqad: Jarrett Cooperdaha: Isniinta - Jimce 8:00 AM - 4:30 PM  Kharashyada: Bilaash   Phone: [phone] Email: hra@[facility] Website: https://www.[facility].gov/hra/Delta-housing-and-mbbxumrgwfjvi-bfmirbcwc-rru     8  Mary Anne Horumarkaidena Bulshada ee [place]: 8.41 miles      Qof ahaan   [address] [place] [address]  Luuqad: Jarrett Elmorecadaha: Isniinta - Jimce 8:30 AM - 4:00 PM  Kharashyada: Bilaash   Phone: [phone] Email: reception@[email or web address] Website: http://www.[facility].[email or web address]     Abbey henningkhsiyaadka  9  Hoyga Badbaadiyahayaga [place]: 11.98 miles      [facility]   [address] [place] [address]  Luuqad: Jarrett Gonzalez: Alijcaa - Axad Fura [address] [facility]  Manda: Pratik   Phone: [phone] Email: communications@[facility] Website: https://[facility].org/oursaviourshousing/     10  [facility] - fredy Gonzalez [facility] [place]: 12.02 miles      [facility]   [address] [place] [address]  Luuqad: Jarrett Gonzalez: Alicjaa - Axad 5:00 PM - 10:00 AM   Devin: FredzanesaturninoHerve   Phone: [phone] Email: info@[employer] Website: https://www.[employer].org/locations/higher-ground-shelter/          Gloria alvarez & Umesh Petersen   917  Miguelina German   311  Jimmy Huggins   [phone]  [employer]  anais olivo   [phone] (TALK)  Mayur alvarez  Ashlee Vazquez   [phone] (4-A-Child)  Mayur rizzo Galcassidy   [phone] (HOPE)  [employer]   [phone] (RUNAWAY)  Mayur Moss   [phone]  Colin Tong   [phone] (HELP)     oral

## 2023-10-23 ENCOUNTER — OFFICE VISIT (OUTPATIENT)
Dept: OPHTHALMOLOGY | Facility: CLINIC | Age: 38
End: 2023-10-23
Attending: OPHTHALMOLOGY
Payer: COMMERCIAL

## 2023-10-23 DIAGNOSIS — C69.01 MALIGNANT NEOPLASM OF RIGHT CONJUNCTIVA (H): Primary | ICD-10-CM

## 2023-10-23 DIAGNOSIS — H11.153 PINGUECULA OF BOTH EYES: ICD-10-CM

## 2023-10-23 PROCEDURE — 92285 EXTERNAL OCULAR PHOTOGRAPHY: CPT | Performed by: OPHTHALMOLOGY

## 2023-10-23 PROCEDURE — G0463 HOSPITAL OUTPT CLINIC VISIT: HCPCS | Performed by: OPHTHALMOLOGY

## 2023-10-23 PROCEDURE — 99204 OFFICE O/P NEW MOD 45 MIN: CPT | Performed by: OPHTHALMOLOGY

## 2023-10-23 ASSESSMENT — CONF VISUAL FIELD
OS_SUPERIOR_TEMPORAL_RESTRICTION: 0
OD_SUPERIOR_NASAL_RESTRICTION: 0
OD_NORMAL: 1
OS_INFERIOR_NASAL_RESTRICTION: 0
OS_NORMAL: 1
OS_SUPERIOR_NASAL_RESTRICTION: 0
OS_INFERIOR_TEMPORAL_RESTRICTION: 0
OD_INFERIOR_NASAL_RESTRICTION: 0
OD_INFERIOR_TEMPORAL_RESTRICTION: 0
METHOD: COUNTING FINGERS
OD_SUPERIOR_TEMPORAL_RESTRICTION: 0

## 2023-10-23 ASSESSMENT — VISUAL ACUITY
OS_SC: 20/20
OD_SC: 20/20
METHOD: SNELLEN - LINEAR

## 2023-10-23 ASSESSMENT — SLIT LAMP EXAM - LIDS
COMMENTS: NORMAL
COMMENTS: NORMAL

## 2023-10-23 ASSESSMENT — TONOMETRY
OD_IOP_MMHG: 21
OS_IOP_MMHG: 20
IOP_METHOD: ICARE

## 2023-10-23 NOTE — PROGRESS NOTES
HPI       Pinguecula Evaluation    In right eye.  Since onset it is stable.  Associated symptoms include Negative for double vision, eye pain and headache. Additional comments: New patient referred by Dr. Awad for pinguecula evaluation. VA stable since visit with Dr. Awad (09/2023). No eye pain or headaches. No use of gtts.           Last edited by Deandra Felton CO on 10/23/2023  2:13 PM.         Review of systems for the eyes was negative other than the pertinent positives/negatives listed in the HPI.      Assessment & Plan    HPI:  Blanca Zee is a 38 year old male with 2 year history of T2DM, HTN, HLD presents for annual dilated fundus exam. No acute complaints today.    POHx: denies  PMHx: T2DM  Current Medications: alcohol swab prep pads, Use to swab area of injection/yazmin as directed.  atorvastatin (LIPITOR) 20 MG tablet, Take 1 tablet (20 mg) by mouth daily  blood glucose (NO BRAND SPECIFIED) test strip, Use to test blood sugar 1 times daily or as directed. To accompany: Blood Glucose Monitor Brands: per insurance.  blood glucose calibration (NO BRAND SPECIFIED) solution, To accompany: Blood Glucose Monitor Brands: per insurance.  blood glucose monitoring (NO BRAND SPECIFIED) meter device kit, Use to test blood sugar 1 times daily or as directed. Preferred blood glucose meter OR supplies to accompany: Blood Glucose Monitor Brands: per insurance.  cyclobenzaprine (FLEXERIL) 10 MG tablet, Take 1 tablet (10 mg) by mouth 3 times daily as needed for muscle spasms  gabapentin (NEURONTIN) 100 MG capsule, Take 1 capsule (100 mg) by mouth 3 times daily  glipiZIDE (GLUCOTROL XL) 10 MG 24 hr tablet, Take 1 tablet (10 mg) by mouth daily  lisinopril (ZESTRIL) 5 MG tablet, TAKE 1 TABLET(5 MG) BY MOUTH DAILY  meloxicam (MOBIC) 15 MG tablet, Take 1 tablet daily as needed for back pain  metFORMIN (GLUCOPHAGE XR) 500 MG 24 hr tablet, Take 2 tablets (1,000 mg) by mouth 2 times daily (with meals)  thin (NO BRAND  SPECIFIED) lancets, Use with lanceting device. To accompany: Blood Glucose Monitor Brands: per insurance.    No current facility-administered medications on file prior to visit.    FHx: denies family history of ocular conditions   PSHx: denies history of ocular surgeries     Current Eye Medications:    Assessment & Plan:    #Pinguecula OU  #malignant neoplasm of conj right eye?  Today with right eye leukoplakia and pigments.  Recommend excision and pathology.  Discussed with patient benefits, alternatives and risks of conj excision surgery. Told of risks of blindness and decreased vision as well as scarring and recurrence of the pterygium. Explained that an amniotic membrane may be used to help with the healing.   Left eye can wait for now as it is not showing signs of malignancy and patient is not symptomatic    Other conditions followed with Dr Awad  #Type 2 diabetes mellitus without complication, without long-term current use of insulin (H)  Diagnosed 2021  Most recent HgBA1c 8.1 on 4/10/23  No background diabetic retinopathy or neovascularization noted on today's exam.  Discussed ocular and systemic benefits of blood pressure and blood sugar control.  Return in 1 year for full exam with dilation or sooner if changes to vision.      #Cortical cataract, both eyes  Mild cataracts are present . No treatment currently recommended. The patient will monitor for vision changes and contact us with any decrease in vision. Recheck in one year.     No follow-ups on file.    Attending Physician Attestation:  Complete documentation of historical and exam elements from today's encounter can be found in the full encounter summary report (not reduplicated in this progress note).  I personally obtained the chief complaint(s) and history of present illness.  I confirmed and edited as necessary the review of systems, past medical/surgical history, family history, social history, and examination findings as documented by  others; and I examined the patient myself.  I personally reviewed the relevant tests, images, and reports as documented above.  I formulated and edited as necessary the assessment and plan and discussed the findings and management plan with the patient and family. - Dylan Bell MD     to get better

## 2023-10-24 ENCOUNTER — TELEPHONE (OUTPATIENT)
Dept: OPHTHALMOLOGY | Facility: CLINIC | Age: 38
End: 2023-10-24
Payer: COMMERCIAL

## 2023-10-24 PROBLEM — H11.153 PINGUECULA OF BOTH EYES: Status: ACTIVE | Noted: 2023-10-23

## 2023-10-24 PROBLEM — C69.01: Status: ACTIVE | Noted: 2023-10-23

## 2023-10-24 NOTE — TELEPHONE ENCOUNTER
Patient is schedule for surgery with: Dr. Perez    Surgery Date: 11/13/23     [facility]    H&P: to be completed by Primary Care team - patient instructed to schedule [facility]     Post-op: 11/14, and 12/13 (not sure who/when to schedule 1 week post op)    Patient will receive a phone call from pre-admission nurses 1-2 days prior to surgery with arrival time and NPO instructions.    Patient aware times are subject to change up until day before surgery.     Patient questions/concerns: N/A     Surgery packet was sent via US mail on 10/24/23 ([place] packet)      Selene Whiting on 10/24/2023 at 9:26 AM

## 2023-10-31 DIAGNOSIS — C69.01 MALIGNANT NEOPLASM OF RIGHT CONJUNCTIVA (H): Primary | ICD-10-CM

## 2023-10-31 RX ORDER — OFLOXACIN 3 MG/ML
1 SOLUTION/ DROPS OPHTHALMIC 4 TIMES DAILY
Qty: 5 ML | Refills: 1 | Status: SHIPPED | OUTPATIENT
Start: 2023-10-31

## 2023-10-31 RX ORDER — PREDNISOLONE ACETATE 10 MG/ML
1 SUSPENSION/ DROPS OPHTHALMIC 4 TIMES DAILY
Qty: 5 ML | Refills: 1 | Status: SHIPPED | OUTPATIENT
Start: 2023-10-31

## 2023-11-09 ENCOUNTER — OFFICE VISIT (OUTPATIENT)
Dept: INTERNAL MEDICINE | Facility: CLINIC | Age: 38
End: 2023-11-09
Payer: COMMERCIAL

## 2023-11-09 VITALS
TEMPERATURE: 98.7 F | SYSTOLIC BLOOD PRESSURE: 125 MMHG | WEIGHT: 201 LBS | OXYGEN SATURATION: 97 % | HEIGHT: 71 IN | DIASTOLIC BLOOD PRESSURE: 81 MMHG | BODY MASS INDEX: 28.14 KG/M2 | HEART RATE: 98 BPM | RESPIRATION RATE: 16 BRPM

## 2023-11-09 DIAGNOSIS — E11.9 TYPE 2 DIABETES MELLITUS WITHOUT RETINOPATHY (H): ICD-10-CM

## 2023-11-09 DIAGNOSIS — E78.5 HYPERLIPIDEMIA LDL GOAL <100: ICD-10-CM

## 2023-11-09 DIAGNOSIS — H11.151 PINGUECULA OF RIGHT EYE: ICD-10-CM

## 2023-11-09 DIAGNOSIS — Z01.818 PRE-OP EXAM: Primary | ICD-10-CM

## 2023-11-09 PROCEDURE — 99214 OFFICE O/P EST MOD 30 MIN: CPT

## 2023-11-09 NOTE — PROGRESS NOTES
[facility]  [address]  [address]  [facility] [address]  Phone: [phone]  Primary Provider: Melissa Lam  Pre-op Performing Provider: VÍCTOR HERRERA      PREOPERATIVE EVALUATION:  Today's date: 11/9/2023    Blanca is a 38 year old male who presents for a preoperative evaluation.      Surgical Information:  Surgery/Procedure: EXCISION, LESION, CONJUNCTIVA with amniotic membrane transplant right eye   Surgery Location: [facility]   Surgeon: Dr. Valenzuela   Surgery Date: 11/13/23  Time of Surgery: 7:00 am     Where patient plans to recover: At home with family  Fax number for surgical facility: Note does not need to be faxed, will be available electronically in Epic.    Assessment & Plan     The proposed surgical procedure is considered LOW risk.    (Z01.818) Pre-op exam  (primary encounter diagnosis)  Comment: Okay to proceed with procedure as planned. Pt with malignant neoplasm of R eye. Plan for excision. He has prescription eye drops at home that he will use prior to procedure as advised.  Plan:             (H11.151) Pinguecula of right eye  Comment: Malignant neoplasm of right eye present. Plan for removal on 11/13/23. He is following with [facility]   Plan:     (E11.9) Type 2 diabetes mellitus without retinopathy (H)  Comment: A1C moderately well controlled at 7.7%. Not on any insulin therapy.  Plan:     (E78.5) Hyperlipidemia LDL goal <100  Comment: LDL well controlled. At goal of <100. Continue Lipitor 20MG  Plan:             - No identified additional risk factors other than previously addressed    Antiplatelet or Anticoagulation Medication Instructions:   - Patient is on no antiplatelet or anticoagulation medications.    Additional Medication Instructions:  Patient is to take all scheduled medications on the day of surgery    RECOMMENDATION:  APPROVAL GIVEN to proceed with proposed procedure, without further  diagnostic evaluation.            Subjective       HPI related to upcoming procedure:     Per ophthalmology note on 10/23/23:    '#Pinguecula OU  #malignant neoplasm of conj right eye?  Today with right eye leukoplakia and pigments.  Recommend excision and pathology.  Discussed with patient benefits, alternatives and risks of conj excision surgery. Told of risks of blindness and decreased vision as well as scarring and recurrence of the pterygium. Explained that an amniotic membrane may be used to help with the healing.   Left eye can wait for now as it is not showing signs of malignancy and patient is not symptomatic'            11/9/2023     7:51 AM   Preop Questions   1. Have you ever had a heart attack or stroke? No   2. Have you ever had surgery on your heart or blood vessels, such as a stent placement, a coronary artery bypass, or surgery on an artery in your head, neck, heart, or legs? No   3. Do you have chest pain with activity? No   4. Do you have a history of  heart failure? No   5. Do you currently have a cold, bronchitis or symptoms of other infection? No   6. Do you have a cough, shortness of breath, or wheezing? No   7. Do you or anyone in your family have previous history of blood clots? No   8. Do you or does anyone in your family have a serious bleeding problem such as prolonged bleeding following surgeries or cuts? No   9. Have you ever had problems with anemia or been told to take iron pills? No   10. Have you had any abnormal blood loss such as black, tarry or bloody stools? No   11. Have you ever had a blood transfusion? No   12. Are you willing to have a blood transfusion if it is medically needed before, during, or after your surgery? Yes   13. Have you or any of your relatives ever had problems with anesthesia? No   14. Do you have sleep apnea, excessive snoring or daytime drowsiness? No   15. Do you have any artifical heart valves or other implanted medical devices like a pacemaker,  defibrillator, or continuous glucose monitor? No   16. Do you have artificial joints? No   17. Are you allergic to latex? No       Health Care Directive:  Patient does not have a Health Care Directive or Living Will: Discussed advance care planning with patient; however, patient declined at this time.    Preoperative Review of :   reviewed - no record of controlled substances prescribed.          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 No pertinent surgical history.  Current Outpatient Medications   Medication Sig Dispense Refill    alcohol swab prep pads Use to swab area of injection/yazmin as directed. 100 each 3    atorvastatin (LIPITOR) 20 MG tablet Take 1 tablet (20 mg) by mouth daily 90 tablet 3    blood glucose (NO BRAND SPECIFIED) test strip Use to test blood sugar 1 times daily or as directed. To accompany: Blood Glucose Monitor Brands: per insurance. 100 strip 0    blood glucose calibration (NO BRAND SPECIFIED) solution To accompany: Blood Glucose Monitor Brands: per insurance. 1 Bottle 3    blood glucose monitoring (NO BRAND SPECIFIED) meter device kit Use to test blood sugar 1 times daily or as directed. Preferred blood glucose meter OR supplies to accompany:  "Blood Glucose Monitor Brands: per insurance. 1 kit 0    glipiZIDE (GLUCOTROL XL) 10 MG 24 hr tablet Take 1 tablet (10 mg) by mouth daily 90 tablet 1    lisinopril (ZESTRIL) 5 MG tablet TAKE 1 TABLET(5 MG) BY MOUTH DAILY 90 tablet 0    meloxicam (MOBIC) 15 MG tablet Take 1 tablet daily as needed for back pain 30 tablet 1    metFORMIN (GLUCOPHAGE XR) 500 MG 24 hr tablet Take 2 tablets (1,000 mg) by mouth 2 times daily (with meals) 360 tablet 1    thin (NO BRAND SPECIFIED) lancets Use with lanceting device. To accompany: Blood Glucose Monitor Brands: per insurance. 100 each 6    cyclobenzaprine (FLEXERIL) 10 MG tablet Take 1 tablet (10 mg) by mouth 3 times daily as needed for muscle spasms (Patient not taking: Reported on 11/9/2023) 30 tablet 2    gabapentin (NEURONTIN) 100 MG capsule Take 1 capsule (100 mg) by mouth 3 times daily (Patient not taking: Reported on 11/9/2023) 90 capsule 1    ofloxacin (OCUFLOX) 0.3 % ophthalmic solution Place 1 drop into the right eye 4 times daily (Patient not taking: Reported on 11/9/2023) 5 mL 1    prednisoLONE acetate (PRED FORTE) 1 % ophthalmic suspension Place 1 drop into the right eye 4 times daily (Patient not taking: Reported on 11/9/2023) 5 mL 1       No Known Allergies     Social History     Tobacco Use    Smoking status: Never    Smokeless tobacco: Never   Substance Use Topics    Alcohol use: Not Currently       History   Drug Use Unknown         Objective     /81   Pulse 98   Temp 98.7  F (37.1  C) (Oral)   Resp 16   Ht 1.81 m (5' 11.25\")   Wt 91.2 kg (201 lb)   SpO2 97%   BMI 27.84 kg/m          Physical Exam  Constitutional:       General: He is not in acute distress.     Appearance: Normal appearance. He is not ill-appearing, toxic-appearing or diaphoretic.   HENT:      Head: Normocephalic and atraumatic.   Eyes:      Conjunctiva/sclera: Conjunctivae normal.   Cardiovascular:      Rate and Rhythm: Normal rate and regular rhythm.      Heart sounds: Normal " heart sounds.   Pulmonary:      Effort: Pulmonary effort is normal.      Breath sounds: Normal breath sounds.   Skin:     General: Skin is warm and dry.   Neurological:      Mental Status: He is alert and oriented to person, place, and time.   Psychiatric:         Mood and Affect: Mood normal.         Behavior: Behavior normal.         Thought Content: Thought content normal.         Judgment: Judgment normal.       Recent Labs   Lab Test 10/04/23  1450 04/10/23  1602 08/30/22  1437   HGB 15.7  --   --      --   --      --  137   POTASSIUM 4.1  --  4.4   CR 0.91  --  0.92   A1C 7.7* 8.1* 7.5*        Diagnostics:  CBC RESULTS:   Recent Labs   Lab Test 10/04/23  1450   WBC 5.7   RBC 5.92*   HGB 15.7   HCT 47.6   MCV 80   MCH 26.5   MCHC 33.0   RDW 12.6         Lab results above WNL from 10/4/23.   Creatinine stable with lab valuie of 0.91 on 10/4/23.     No EKG required, no history of coronary heart disease, significant arrhythmia, peripheral arterial disease or other structural heart disease.    Revised Cardiac Risk Index (RCRI):  The patient has the following serious cardiovascular risks for perioperative complications:   - No serious cardiac risks = 0 points     RCRI Interpretation: 0 points: Class I (very low risk - 0.4% complication rate)         Signed Electronically by: BRIANNA Woods CNP  Copy of this evaluation report is provided to requesting physician.

## 2023-11-10 ENCOUNTER — ANESTHESIA EVENT (OUTPATIENT)
Dept: SURGERY | Facility: AMBULATORY SURGERY CENTER | Age: 38
End: 2023-11-10
Payer: COMMERCIAL

## 2023-11-13 ENCOUNTER — HOSPITAL ENCOUNTER (OUTPATIENT)
Facility: AMBULATORY SURGERY CENTER | Age: 38
Discharge: HOME | End: 2023-11-13
Attending: OPHTHALMOLOGY
Payer: COMMERCIAL

## 2023-11-13 ENCOUNTER — ANESTHESIA (OUTPATIENT)
Dept: SURGERY | Facility: AMBULATORY SURGERY CENTER | Age: 38
End: 2023-11-13
Payer: COMMERCIAL

## 2023-11-13 VITALS
SYSTOLIC BLOOD PRESSURE: 108 MMHG | HEART RATE: 87 BPM | TEMPERATURE: 97.2 F | DIASTOLIC BLOOD PRESSURE: 67 MMHG | BODY MASS INDEX: 27.72 KG/M2 | RESPIRATION RATE: 16 BRPM | OXYGEN SATURATION: 99 % | WEIGHT: 198 LBS | HEIGHT: 71 IN

## 2023-11-13 DIAGNOSIS — C69.01 MALIGNANT NEOPLASM OF RIGHT CONJUNCTIVA (H): Primary | ICD-10-CM

## 2023-11-13 LAB — GLUCOSE BLDC GLUCOMTR-MCNC: 178 MG/DL (ref 70–99)

## 2023-11-13 PROCEDURE — 65779 COVER EYE W/MEMBRANE SUTURE: CPT | Mod: RT | Performed by: OPHTHALMOLOGY

## 2023-11-13 PROCEDURE — 88341 IMHCHEM/IMCYTCHM EA ADD ANTB: CPT | Mod: 26 | Performed by: OPHTHALMOLOGY

## 2023-11-13 PROCEDURE — 65779 COVER EYE W/MEMBRANE SUTURE: CPT | Mod: RT

## 2023-11-13 PROCEDURE — 68110 EXC LES CONJUNCTIVA <1 CM: CPT | Mod: RT | Performed by: OPHTHALMOLOGY

## 2023-11-13 PROCEDURE — 88304 TISSUE EXAM BY PATHOLOGIST: CPT | Mod: 26 | Performed by: OPHTHALMOLOGY

## 2023-11-13 PROCEDURE — 88342 IMHCHEM/IMCYTCHM 1ST ANTB: CPT | Mod: TC | Performed by: OPHTHALMOLOGY

## 2023-11-13 PROCEDURE — 88342 IMHCHEM/IMCYTCHM 1ST ANTB: CPT | Mod: 26 | Performed by: OPHTHALMOLOGY

## 2023-11-13 PROCEDURE — 82962 GLUCOSE BLOOD TEST: CPT | Performed by: PATHOLOGY

## 2023-11-13 PROCEDURE — 68110 EXC LES CONJUNCTIVA <1 CM: CPT | Mod: RT

## 2023-11-13 DEVICE — EYE IMP AMNIOTIC MEMBRANE 2.5 X2.0 CM SZ A: Type: IMPLANTABLE DEVICE | Site: EYE | Status: FUNCTIONAL

## 2023-11-13 RX ORDER — ONDANSETRON 4 MG/1
4 TABLET, ORALLY DISINTEGRATING ORAL EVERY 30 MIN PRN
Status: DISCONTINUED | OUTPATIENT
Start: 2023-11-13 | End: 2023-11-14 | Stop reason: HOSPADM

## 2023-11-13 RX ORDER — OXYCODONE HYDROCHLORIDE 5 MG/1
5 TABLET ORAL
Status: DISCONTINUED | OUTPATIENT
Start: 2023-11-13 | End: 2023-11-14 | Stop reason: HOSPADM

## 2023-11-13 RX ORDER — LIDOCAINE HYDROCHLORIDE AND EPINEPHRINE 10; 10 MG/ML; UG/ML
INJECTION, SOLUTION INFILTRATION; PERINEURAL PRN
Status: DISCONTINUED | OUTPATIENT
Start: 2023-11-13 | End: 2023-11-13 | Stop reason: HOSPADM

## 2023-11-13 RX ORDER — LIDOCAINE HYDROCHLORIDE 20 MG/ML
INJECTION, SOLUTION INFILTRATION; PERINEURAL PRN
Status: DISCONTINUED | OUTPATIENT
Start: 2023-11-13 | End: 2023-11-13

## 2023-11-13 RX ORDER — ONDANSETRON 2 MG/ML
4 INJECTION INTRAMUSCULAR; INTRAVENOUS EVERY 30 MIN PRN
Status: DISCONTINUED | OUTPATIENT
Start: 2023-11-13 | End: 2023-11-14 | Stop reason: HOSPADM

## 2023-11-13 RX ORDER — DICLOFENAC SODIUM 1 MG/ML
1 SOLUTION/ DROPS OPHTHALMIC
Status: COMPLETED | OUTPATIENT
Start: 2023-11-13 | End: 2023-11-13

## 2023-11-13 RX ORDER — PREDNISOLONE ACETATE 1 %
SUSPENSION, DROPS(FINAL DOSAGE FORM)(ML) OPHTHALMIC (EYE) PRN
Status: DISCONTINUED | OUTPATIENT
Start: 2023-11-13 | End: 2023-11-13 | Stop reason: HOSPADM

## 2023-11-13 RX ORDER — ACETAMINOPHEN 325 MG/1
975 TABLET ORAL ONCE
Status: DISCONTINUED | OUTPATIENT
Start: 2023-11-13 | End: 2023-11-14 | Stop reason: HOSPADM

## 2023-11-13 RX ORDER — OXYCODONE HYDROCHLORIDE 5 MG/1
10 TABLET ORAL
Status: DISCONTINUED | OUTPATIENT
Start: 2023-11-13 | End: 2023-11-14 | Stop reason: HOSPADM

## 2023-11-13 RX ORDER — BALANCED SALT SOLUTION 6.4; .75; .48; .3; 3.9; 1.7 MG/ML; MG/ML; MG/ML; MG/ML; MG/ML; MG/ML
SOLUTION OPHTHALMIC PRN
Status: DISCONTINUED | OUTPATIENT
Start: 2023-11-13 | End: 2023-11-13 | Stop reason: HOSPADM

## 2023-11-13 RX ORDER — SODIUM CHLORIDE, SODIUM LACTATE, POTASSIUM CHLORIDE, CALCIUM CHLORIDE 600; 310; 30; 20 MG/100ML; MG/100ML; MG/100ML; MG/100ML
INJECTION, SOLUTION INTRAVENOUS CONTINUOUS
Status: DISCONTINUED | OUTPATIENT
Start: 2023-11-13 | End: 2023-11-14 | Stop reason: HOSPADM

## 2023-11-13 RX ORDER — ACETAMINOPHEN 325 MG/1
975 TABLET ORAL ONCE
Status: COMPLETED | OUTPATIENT
Start: 2023-11-13 | End: 2023-11-13

## 2023-11-13 RX ORDER — PROPARACAINE HYDROCHLORIDE 5 MG/ML
1 SOLUTION/ DROPS OPHTHALMIC ONCE
Status: COMPLETED | OUTPATIENT
Start: 2023-11-13 | End: 2023-11-13

## 2023-11-13 RX ORDER — LIDOCAINE 40 MG/G
CREAM TOPICAL
Status: DISCONTINUED | OUTPATIENT
Start: 2023-11-13 | End: 2023-11-14 | Stop reason: HOSPADM

## 2023-11-13 RX ORDER — PROPOFOL 10 MG/ML
INJECTION, EMULSION INTRAVENOUS PRN
Status: DISCONTINUED | OUTPATIENT
Start: 2023-11-13 | End: 2023-11-13

## 2023-11-13 RX ORDER — FENTANYL CITRATE 50 UG/ML
50 INJECTION, SOLUTION INTRAMUSCULAR; INTRAVENOUS
Status: DISCONTINUED | OUTPATIENT
Start: 2023-11-13 | End: 2023-11-14 | Stop reason: HOSPADM

## 2023-11-13 RX ADMIN — DICLOFENAC SODIUM 1 DROP: 1 SOLUTION/ DROPS OPHTHALMIC at 07:10

## 2023-11-13 RX ADMIN — PROPOFOL 70 MG: 10 INJECTION, EMULSION INTRAVENOUS at 08:12

## 2023-11-13 RX ADMIN — DICLOFENAC SODIUM 1 DROP: 1 SOLUTION/ DROPS OPHTHALMIC at 07:14

## 2023-11-13 RX ADMIN — LIDOCAINE HYDROCHLORIDE 80 MG: 20 INJECTION, SOLUTION INFILTRATION; PERINEURAL at 08:12

## 2023-11-13 RX ADMIN — SODIUM CHLORIDE, SODIUM LACTATE, POTASSIUM CHLORIDE, CALCIUM CHLORIDE: 600; 310; 30; 20 INJECTION, SOLUTION INTRAVENOUS at 07:13

## 2023-11-13 RX ADMIN — ACETAMINOPHEN 975 MG: 325 TABLET ORAL at 07:01

## 2023-11-13 RX ADMIN — DICLOFENAC SODIUM 1 DROP: 1 SOLUTION/ DROPS OPHTHALMIC at 07:03

## 2023-11-13 RX ADMIN — PROPARACAINE HYDROCHLORIDE 1 DROP: 5 SOLUTION/ DROPS OPHTHALMIC at 07:03

## 2023-11-13 NOTE — OP NOTE
OPERATIVE REPORT    Date of Surgery: 11/13/2023    Pre-Operative Diagnosis:  Conjunctival lesion, right Eye    Post-Operative Diagnosis: Conjunctival lesion, right Eye    Indications: Irritation, concern for malignancy    Procedure: Conjunctival Lesion Excision right eye     AMT placement, right eye using Tisseel glue.    Attending Physician:  Dylan Bell MD    Assistant(s): Kinga Marcial MD    Anesthesia: MAC, Retrobulbar Block.    Complications: None    Specimens: conjunctival tumor    Procedure Description:     The risks, benefits and alternatives of cataract surgery were discussed and consent was obtained.  The patient was identified and marked by the attending surgeon in the pre-operative area. The patient was brought back to the operating and placed under monitored anesthesia care.   The patient received a retrobulbar block. The patient was prepped and draped in the usual standard sterile fashion for opthalmic surgery.  A lid speculum was placed in the eye.  Attention was turned to the medial quadrant.  The lesion was outlined with a marking pen. 2% lidocaine with 1:100,000 epi was injected under the conjunctiva to dissect a plane between tenons capsule and conjunctiva. A no touch technique was used to excise the lesion using Marisol scissors.  Excess tenons was dissected from healthy conjunctiva and excised with care to protect the medial rectus.  The conjunctival defect was measured and then an AMT graft was cut to size. The AMT was then secured with TISSEEL glue using a sandwich technique. Excess glue was removed.  8-0 vicryl suture was used to secure AMT at the limbus using 2 interrupted sutures. The nasal conjunctival defect was also partial closed using 2 interrupted 8-0 vicryl suture. A 20mm Kontour lens was placed on the eye followed by a drop of prednisolone and ofloxacin and a patch and rigid shield.  The patient was brought back to PACU in stable condition and asked to follow up in clinic the  next day.      Kinga Marcial MD  Resident Physician, PGY-3  Department of Ophthalmology

## 2023-11-13 NOTE — ANESTHESIA CARE TRANSFER NOTE
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    Anesthesia Type:   MAC     Note:    Oropharynx: oropharynx clear of all foreign objects and spontaneously breathing  Level of Consciousness: awake  Oxygen Supplementation: room air    Independent Airway: airway patency satisfactory and stable  Dentition: dentition unchanged  Vital Signs Stable: post-procedure vital signs reviewed and stable  Report to RN Given: handoff report given  Patient transferred to: Phase II    Handoff Report: Identifed the Patient, Identified the Reponsible Provider, Reviewed the pertinent medical history, Discussed the surgical course, Reviewed Intra-OP anesthesia mangement and issues during anesthesia, Set expectations for post-procedure period and Allowed opportunity for questions and acknowledgement of understanding      Vitals:  Vitals Value Taken Time   BP     Temp     Pulse 89    Resp 13    SpO2 98%        Electronically Signed By: BRIANNA Robb CRNA  November 13, 2023  8:59 AM

## 2023-11-13 NOTE — ANESTHESIA PREPROCEDURE EVALUATION
"Anesthesia Pre-Procedure Evaluation    Patient: Blanca Zee   MRN: [id number] : 1985        Procedure : Procedure(s):  EXCISION, LESION, CONJUNCTIVA with amniotic membrane transplant right eye          Past Medical History:   Diagnosis Date    Diabetes (H)     High cholesterol     Hypertension       No past surgical history on file.   No Known Allergies   Social History     Tobacco Use    Smoking status: Never    Smokeless tobacco: Never   Substance Use Topics    Alcohol use: Not Currently      Wt Readings from Last 1 Encounters:   23 89.8 kg (198 lb)        Anesthesia Evaluation            ROS/MED HX  ENT/Pulmonary:       Neurologic:       Cardiovascular:     (+) Dyslipidemia - -   -  - -                                      METS/Exercise Tolerance:     Hematologic:       Musculoskeletal:       GI/Hepatic:       Renal/Genitourinary:       Endo:     (+)  type II DM,                    Psychiatric/Substance Use:       Infectious Disease:       Malignancy:       Other:            Physical Exam    Airway        Mallampati: II       Respiratory Devices and Support         Dental       (+) Minor Abnormalities - some fillings, tiny chips      Cardiovascular          Rhythm and rate: regular     Pulmonary           breath sounds clear to auscultation           OUTSIDE LABS:  CBC:   Lab Results   Component Value Date    WBC 5.7 10/04/2023    HGB 15.7 10/04/2023    HCT 47.6 10/04/2023     10/04/2023     BMP:   Lab Results   Component Value Date     10/04/2023     2022    POTASSIUM 4.1 10/04/2023    POTASSIUM 4.4 2022    CHLORIDE 101 10/04/2023    CHLORIDE 102 2022    CO2 27 10/04/2023    CO2 24 2022    BUN 7.4 10/04/2023    BUN 6 (L) 2022    CR 0.91 10/04/2023    CR 0.92 2022     (H) 2023     (H) 10/04/2023     COAGS: No results found for: \"PTT\", \"INR\", \"FIBR\"  POC: No results found for: \"BGM\", \"HCG\", \"HCGS\"  HEPATIC:   Lab Results "   Component Value Date    ALBUMIN 4.6 10/04/2023    PROTTOTAL 7.7 10/04/2023    ALT 24 10/04/2023    AST 24 10/04/2023    ALKPHOS 135 (H) 10/04/2023    BILITOTAL 0.5 10/04/2023     OTHER:   Lab Results   Component Value Date    A1C 7.7 (H) 10/04/2023    ALHAJI 9.8 10/04/2023       Anesthesia Plan    ASA Status:  2    NPO Status:  NPO Appropriate    Anesthesia Type: MAC.     - Reason for MAC: immobility needed, straight local not clinically adequate              Consents    Anesthesia Plan(s) and associated risks, benefits, and realistic alternatives discussed. Questions answered and patient/representative(s) expressed understanding.     - Discussed:     - Discussed with:  Patient            Postoperative Care            Comments:                Rodo Hayden MD

## 2023-11-13 NOTE — ANESTHESIA POSTPROCEDURE EVALUATION
Patient: Blanca Zee    Procedure: Procedure(s):  EXCISION, LESION, CONJUNCTIVA with amniotic membrane transplant right eye       Anesthesia Type:  MAC    Note:  Disposition: Outpatient   Postop Pain Control: Uneventful            Sign Out: Well controlled pain   PONV: No   Neuro/Psych: Uneventful            Sign Out: Acceptable/Baseline neuro status   Airway/Respiratory: Uneventful            Sign Out: Acceptable/Baseline resp. status   CV/Hemodynamics: Uneventful            Sign Out: Acceptable CV status; No obvious hypovolemia; No obvious fluid overload   Other NRE: NONE   DID A NON-ROUTINE EVENT OCCUR?            Last vitals:  Vitals Value Taken Time   /67 11/13/23 0913   Temp 36.2  C (97.2  F) 11/13/23 0858   Pulse 87 11/13/23 0913   Resp 16 11/13/23 0913   SpO2 99 % 11/13/23 0913       Electronically Signed By: Rodo Hayden MD  November 13, 2023  10:52 AM

## 2023-11-13 NOTE — DISCHARGE INSTRUCTIONS
Day of Surgery Instructions: EyeSurgery      Activity:  Please wear your eye shield at night for 2 weeks, then stop. Secure it with tape, forehead to cheek.  Do not rub your eye as this could damage your surgery.   Stay out of constantine and dirty environments while your eye is healing.  Refrain from strenuous activity and try not to bend below the waist for 1 week after surgery.  Do not swim or use a hot tub or a sauna for at least 4 weeks after surgery.  Shower and wash your face as you normally would, but do not rub your eye.  Do not wear eye make-up for 2 weeks after surgery.    Shield:   Please keep your eye shield on at all times until it is removed by our technicians tomorrow in clinic. Today is the only time you will have to wear your eye shield during the day.    Eye Drops:  Do not take any eye drops in your surgical eye until tomorrow unless asked to do otherwise by your doctor.  Bring your eye drops to clinic tomorrow.  Ask your doctor if you should resume taking eye drops that you were taking before surgery, such as glaucoma eye drops.      Eye Drops (starting tomorrow after shield/patch is removed):  VIgamox/Gatiflox/Ofloxacin 1 drop 4 times daily for a month unless otherwise instructed. Refills are not needed.  Prednisolone 1 drop 4 times daily for 1 month, then 1 drop 3 times daily for 1 month, then 1 drop 2 times daily for 1 month, then 1 drop 1 time daily for 1 month, unless otherwise instructed. Shake well. You have refills and will likely need at least one.  Preservative free artificial tears 1 drop as often as needed for comfort. Some patients find these drops to be more soothing when they are chilled. You can store these drops in the refrigerator.  Refresh PM ointment at bedtime, unless otherwise instructed.  Do not use if a contact lens has been placed on your eye.  This medication is available over the counter without a prescription.    Take eye drops 3-5 minutes apart so that they do not wash  each other out. The order of the drops does not matter. Please wash your hands prior to taking the drops. Do not touch the tip of the bottle to your eyelid or any other surface as this may contaminate it. Shake Prednisolone 20 times before using it.    Apply Refresh PM ointment last because the oily consistency can reduce absorption of eye drops. If you have difficulty applying the ointment, try holding the capped tube under warm water or in your hand for a few minutes. This makes the ointment into an oil that is easier to apply. If Refresh PM makes your eyelids crusty in the morning, you can gently wipe your eyelids with a warm, moist towel.    Other Medications:  Resume taking all of your usual medications.  Tylenol one pill every 4-6 hours as needed for pain. Pain medications will reduce the pain that you experience from your eye, but they will not completely eliminate the pain.  Do not combine these medications with alcohol.      What to expect:  All eyes are different, but in general you can expect comfort and vision to improve steadily.  Blurry vision and mild irritation, discomfort, tearing, and redness are normal.  Stinging with eye drops is normal.  If a contact lens was placed on your eye, it is usually removed between 4 and 14 days after surgery.  It is normal for your current pair of glasses or contact lenses to no longer work after you have eye surgery because the surgery changes your prescription.  You will be checked for a new prescription between 4-6 weeks after surgery, or when your eye is fully healed.    Increasing redness, pain, extreme light sensitivity, and decreasing vision are not normal. If any of these symptoms occur, call and speak to the cornea doctor      [facility]  Home Care Following Anesthesia  For 24 hours after surgery:  Get plenty of rest.  A responsible adult must stay with you for at least 24 hours after you leave the surgery center.  Do not  "drive or use heavy equipment.  If you have weakness or tingling, don't drive or use heavy equipment until this feeling goes away.   Do not drink alcohol.   Avoid strenuous or risky activities.  Ask for help when climbing stairs.  You may feel lightheaded.  IF so, sit for a few minutes before standing.  Have someone help you get up.   If you have nausea (feel sick to your stomach): Drink only clear liquids such as apple juice, ginger ale, broth or 7-Up.  Rest may also help.  Be sure to drink enough fluids.  Move to a regular diet as you feel able.   You may have a slight fever.  Call the doctor if your fever is over 100 F (37.7 C) (taken under the tongue) or lasts longer than 24 hours.  You may have a dry mouth, a sore throat, muscle aches or trouble sleeping. These should go away after 24 hours.  Do not make important or legal decisions.   It is recommended to avoid smoking.        Today you received a Marcaine or bupivacaine block to numb the nerves near your surgery site.  This is a block using local anesthetic or \"numbing\" medication injected around the nerves to anesthetize or \"numb\" the area supplied by those nerves.  This block is injected into the muscle layer near your surgical site.  The medication may numb the location where you had surgery for 6-18 hours, but may last up to 24 hours.  If your surgical site is an arm or leg you should be careful with your affected limb, since it is possible to injure your limb without being aware of it due to the numbing.  Until full feeling returns, you should guard against bumping or hitting your limb, and avoid extreme hot or cold temperatures on the skin.  As the block wears off, the feeling will return as a tingling or prickly sensation near your surgical site.  You will experience more discomfort from your incision as the feeling returns.  You may want to take a pain pill (a narcotic or Tylenol if this was prescribed by your surgeon) when you start to experience mild " pain before the pain beccomes more severe.  If your pain medications do not control your pain you should notifiy your surgeon.    Tips for taking pain medications  To get the best pain relief possible, remember these points:  Take pain medications as directed, before pain becomes severe.  Pain medication can upset your stomach: taking it with food may help.  Constipation is a common side effect of pain medication. Drink plenty of  fluids.  Eat foods high in fiber. Take a stool softener if recommended by your doctor or pharmacist.  Do not drink alcohol, drive or operate machinery while taking pain medications.  Ask about other ways to control pain, such as with heat, ice or relaxation.    Tylenol/Acetaminophen Consumption    If you feel your pain relief is insufficient, you may take Tylenol/Acetaminophen in addition to your narcotic pain medication.   Be careful not to exceed 4,000 mg of Tylenol/Acetaminophen in a 24 hour period from all sources.  If you are taking extra strength Tylenol/acetaminophen (500 mg), the maximum dose is 8 tablets in 24 hours.  If you are taking regular strength acetaminophen (325 mg), the maximum dose is 12 tablets in 24 hours.    Call a doctor for any of the following:  Signs of infection (fever, growing tenderness at the surgery site, a large amount of drainage or bleeding, severe pain, foul-smelling drainage, redness, swelling).  It has been over 8 to 10 hours since surgery and you are still not able to urinate (pass water).  Headache for over 24 hours.  Numbness, tingling or weakness the day after surgery (if you had spinal anesthesia).  Signs of Covid-19 infection (temperature over 100 degrees, shortness of breath, cough, loss of taste/smell, generalized body aches, persistent headache, chills, sore throat, nausea/vomiting/diarrhea)  Your doctor is:  Dr. Dylan Bell, Ophthalmology: [phone]                    Or dial [phone] and ask for the resident on call for:   Ophthalmology  For emergency care, call the:  [facility]:  [phone] (TTY for hearing impaired: [phone])

## 2023-11-14 ENCOUNTER — OFFICE VISIT (OUTPATIENT)
Dept: OPHTHALMOLOGY | Facility: CLINIC | Age: 38
End: 2023-11-14
Attending: OPHTHALMOLOGY
Payer: COMMERCIAL

## 2023-11-14 DIAGNOSIS — C69.01 MALIGNANT NEOPLASM OF RIGHT CONJUNCTIVA (H): Primary | ICD-10-CM

## 2023-11-14 ASSESSMENT — CONF VISUAL FIELD
OD_INFERIOR_TEMPORAL_RESTRICTION: 0
OS_INFERIOR_TEMPORAL_RESTRICTION: 0
OS_NORMAL: 1
OD_SUPERIOR_TEMPORAL_RESTRICTION: 0
OS_INFERIOR_NASAL_RESTRICTION: 0
OD_INFERIOR_NASAL_RESTRICTION: 0
OS_SUPERIOR_NASAL_RESTRICTION: 0
METHOD: COUNTING FINGERS
OD_NORMAL: 1
OD_SUPERIOR_NASAL_RESTRICTION: 0
OS_SUPERIOR_TEMPORAL_RESTRICTION: 0

## 2023-11-14 ASSESSMENT — VISUAL ACUITY
OS_SC: 20/20
OD_PH_SC: 20/70
OD_SC+: +1
OD_SC: 20/100
METHOD: SNELLEN - LINEAR

## 2023-11-14 ASSESSMENT — TONOMETRY
OS_IOP_MMHG: 20
IOP_METHOD: ICARE
OD_IOP_MMHG: 19

## 2023-11-14 ASSESSMENT — SLIT LAMP EXAM - LIDS
COMMENTS: NORMAL
COMMENTS: NORMAL

## 2023-11-14 NOTE — PROGRESS NOTES
HPI       Post Op (Ophthalmology) Right Eye    In right eye.  Associated symptoms include Negative for eye pain, tearing, itching and discharge.  Treatments tried include no treatments.  Pain was noted as 0/10. Additional comments: 1 day s/p excision, lesion, conjunctiva with amniotic membrane transplant right eye 11/13/2023.             Comments    Eye meds: none  Slept good last night, no concerns of eyes today.    JEROME Johnson 11/14/2023 12:46 PM            Last edited by Anna Alberto CO on 11/14/2023 12:53 PM.         Review of systems for the eyes was negative other than the pertinent positives/negatives listed in the HPI.      Assessment & Plan    HPI:  Blanca Zee is a 38 year old male who presents for POD1 s/p conj lesion excision OD.    11/14/23: Patient states he slept well overnight with minimal pain. Eye shield/patch fell off on its own.      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  POD1 s/p conj lesion excision 11/13/23  - Good post op appearance today, AMT and BCL in place  - Surg path in process, will follow    Plan:  - Start ofloxacin QID OD  - Start prednisolone QID OD  - Eye shield at bedtime  - Discussed return precautions      Other conditions followed with Dr Awad  #Type 2 diabetes mellitus without complication, without long-term current use of insulin (H)  Diagnosed 2021  Most recent HgBA1c 8.1 on 4/10/23  No background diabetic retinopathy or neovascularization noted on today's exam.  Discussed ocular and systemic benefits of blood pressure and blood sugar control.  Return in 1 year for full exam with dilation or sooner if changes to vision.      #Cortical cataract, both eyes  Mild cataracts are present . No  treatment currently recommended. The patient will monitor for vision changes and contact us with any decrease in vision. Recheck in one year.     Patient discussed with Dr. Bell. Follow up 1 week    Kinga Marcial MD  Resident Physician, PGY-3  Department of Ophthalmology

## 2023-11-14 NOTE — NURSING NOTE
Chief Complaints and History of Present Illnesses   Patient presents with    Post Op (Ophthalmology) Right Eye     1 day s/p excision, lesion, conjunctiva with amniotic membrane transplant right eye 11/13/2023.      Chief Complaint(s) and History of Present Illness(es)       Post Op (Ophthalmology) Right Eye              Laterality: right eye    Associated symptoms: Negative for eye pain, tearing, itching and discharge    Treatments tried: no treatments    Pain scale: 0/10    Comments: 1 day s/p excision, lesion, conjunctiva with amniotic membrane transplant right eye 11/13/2023.              Comments    Eye meds: none  Slept good last night, no concerns of eyes today.    JEROME Johnson 11/14/2023 12:46 PM

## 2023-11-14 NOTE — PATIENT INSTRUCTIONS
- Start ofloxacin 4 times daily in the right eye  - Start prednisolone 4 times daily in the right eye    Day of Surgery Instructions: Eye Surgery      Activity:  Please wear your eye shield at night for 2 weeks, then stop. Secure it with tape, forehead to cheek.  Do not rub your eye as this could damage your surgery.   Stay out of constantine and dirty environments while your eye is healing.  Refrain from strenuous activity and try not to bend below the waist for 1 week after surgery.  Do not swim or use a hot tub or a sauna for at least 4 weeks after surgery.  Shower and wash your face as you normally would, but do not rub your eye.      Eye Drops (starting tomorrow after shield/patch is removed):  VIgamox/Gatiflox/Ofloxacin 1 drop 4 times daily for a month unless otherwise instructed. Refills are not needed.  Prednisolone 1 drop 4 times daily for 1 month, then 1 drop 3 times daily for 1 month, then 1 drop 2 times daily for 1 month, then 1 drop 1 time daily for 1 month, unless otherwise instructed. Shake well. You have refills and will likely need at least one.  Preservative free artificial tears 1 drop as often as needed for comfort. Some patients find these drops to be more soothing when they are chilled. You can store these drops in the refrigerator.    Take eye drops 3-5 minutes apart so that they do not wash each other out. The order of the drops does not matter. Please wash your hands prior to taking the drops. Do not touch the tip of the bottle to your eyelid or any other surface as this may contaminate it. Shake Prednisolone 20 times before using it.      Other Medications:  Resume taking all of your usual medications.  Tylenol one pill every 4-6 hours as needed for pain. Pain medications will reduce the pain that you experience from your eye, but they will not completely eliminate the pain.  Do not combine these medications with alcohol.      What to expect:  All eyes are different, but in general you can expect  comfort and vision to improve steadily.  Blurry vision and mild irritation, discomfort, tearing, and redness are normal.  Stinging with eye drops is normal.  If a contact lens was placed on your eye, it is usually removed between 4 and 14 days after surgery.  It is normal for your current pair of glasses or contact lenses to no longer work after you have eye surgery because the surgery changes your prescription.  You will be checked for a new prescription between 4-6 weeks after surgery, or when your eye is fully healed.    Increasing redness, pain, extreme light sensitivity, and decreasing vision are not normal. If any of these symptoms occur, call and speak to the cornea doctor

## 2023-11-16 LAB
PATH REPORT.COMMENTS IMP SPEC: ABNORMAL
PATH REPORT.COMMENTS IMP SPEC: ABNORMAL
PATH REPORT.COMMENTS IMP SPEC: YES
PATH REPORT.FINAL DX SPEC: ABNORMAL
PATH REPORT.GROSS SPEC: ABNORMAL
PATH REPORT.MICROSCOPIC SPEC OTHER STN: ABNORMAL
PATH REPORT.RELEVANT HX SPEC: ABNORMAL
PHOTO IMAGE: ABNORMAL

## 2023-11-22 ENCOUNTER — OFFICE VISIT (OUTPATIENT)
Dept: OPHTHALMOLOGY | Facility: CLINIC | Age: 38
End: 2023-11-22
Attending: STUDENT IN AN ORGANIZED HEALTH CARE EDUCATION/TRAINING PROGRAM
Payer: COMMERCIAL

## 2023-11-22 DIAGNOSIS — Z98.890 POSTOPERATIVE EYE STATE: ICD-10-CM

## 2023-11-22 DIAGNOSIS — C69.01 MALIGNANT NEOPLASM OF RIGHT CONJUNCTIVA (H): Primary | ICD-10-CM

## 2023-11-22 PROCEDURE — G0463 HOSPITAL OUTPT CLINIC VISIT: HCPCS | Performed by: STUDENT IN AN ORGANIZED HEALTH CARE EDUCATION/TRAINING PROGRAM

## 2023-11-22 PROCEDURE — 99024 POSTOP FOLLOW-UP VISIT: CPT | Performed by: STUDENT IN AN ORGANIZED HEALTH CARE EDUCATION/TRAINING PROGRAM

## 2023-11-22 ASSESSMENT — SLIT LAMP EXAM - LIDS
COMMENTS: NORMAL
COMMENTS: NORMAL

## 2023-11-22 ASSESSMENT — TONOMETRY
IOP_METHOD: ICARE
IOP_METHOD: ICARE
OS_IOP_MMHG: 17
OS_IOP_MMHG: 17
OD_IOP_MMHG: 28
OD_IOP_MMHG: 26

## 2023-11-22 ASSESSMENT — VISUAL ACUITY
OD_SC: 20/20
METHOD: SNELLEN - LINEAR
OS_SC: 20/20
OD_SC+: -1

## 2023-11-22 NOTE — PROGRESS NOTES
HPI       Post Op (Ophthalmology) Right Eye              Laterality: right eye    Associated symptoms: redness.  Negative for eye pain and tearing              Comments    Post op week 1 amniotic membrane graft placement in the right eye.  Vision is still poor in the right eye.  Denies pain.  The eye continues to be red.  Is using the prednisolone drop 4 times a day and ofloxacin 4 times a day in the right eye.  Denies problems getting all the doses in.      Peggy Canales on 11/22/2023 at 9:40 AM            Last edited by Peggy Canales on 11/22/2023  9:40 AM.         Review of systems for the eyes was negative other than the pertinent positives/negatives listed in the HPI.      Assessment & Plan    HPI:  Blanca Zee is a 38 year old male who presents for POD1 s/p conj lesion excision OD.    11/22/23: POW1 right conjunctival excision. No eye pain, vision a bit blurry. No new concerns. Using drops as directed.       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 The horizontal margins in the plane of section are uninvolved. No invasive lesion is identified.  Plan:  - Discussed diagnosis with patient  - Stop ofloxacin QID OD  - Start prednisolone taper 3/2/1 weekly  - No water in the eye   - Removed BCL today and trimmed vicryl tails  - Discussed return precautions      Other conditions followed with Dr Awad  #Type 2 diabetes mellitus without complication, without long-term current use of insulin (H)  Diagnosed 2021  Most recent HgBA1c 8.1 on 4/10/23  No background diabetic retinopathy or neovascularization noted on  today's exam.  Discussed ocular and systemic benefits of blood pressure and blood sugar control.  Return in 1 year for full exam with dilation or sooner if changes to vision.      #Cortical cataract, both eyes  Mild cataracts are present . No treatment currently recommended. The patient will monitor for vision changes and contact us with any decrease in vision. Recheck in one year.     RTC 1 month 12/13/23, VT, sooner if new concerns    Enid Nguyen MD  Cornea and External Disease Fellow  [facility]     Attending Physician Attestation: Complete documentation of historical and exam elements from today's encounter can be found in the full encounter summary report (not reduplicated in this progress note). I personally obtained the chief complaint(s) and history of present illness. I confirmed and edited as necessary the review of systems, past medical/surgical history, family history, social history, and examination findings as documented by others; and I examined the patient myself. I personally reviewed the relevant tests, images, and reports as documented above. I formulated and edited as necessary the assessment and plan and discussed the findings and management plan with the patient and family.   -Enid Nguyen MD

## 2023-11-22 NOTE — NURSING NOTE
Chief Complaints and History of Present Illnesses   Patient presents with    Post Op (Ophthalmology) Right Eye     Chief Complaint(s) and History of Present Illness(es)       Post Op (Ophthalmology) Right Eye              Laterality: right eye    Associated symptoms: redness.  Negative for eye pain and tearing              Comments    Post op week 1 amniotic membrane graft placement in the right eye.  Vision is still poor in the right eye.  Denies pain.  The eye continues to be red.  Is using the prednisolone drop 4 times a day and ofloxacin 4 times a day in the right eye.  Denies problems getting all the doses in.      Peggy Canales on 11/22/2023 at 9:40 AM

## 2023-11-22 NOTE — PATIENT INSTRUCTIONS
Stop ofloxacin (tan cap)    Reduce prednisolone (milky drop) 1 drop 3 times per day for 1 week, then 1 drop 2 times per day for 1 week, then 1 drop once per day for 1 week then stop    No water in the eye

## 2023-11-28 DIAGNOSIS — E11.9 TYPE 2 DIABETES MELLITUS WITHOUT COMPLICATION, WITHOUT LONG-TERM CURRENT USE OF INSULIN (H): ICD-10-CM

## 2023-11-28 RX ORDER — LISINOPRIL 5 MG/1
TABLET ORAL
Qty: 90 TABLET | Refills: 0 | Status: SHIPPED | OUTPATIENT
Start: 2023-11-28 | End: 2024-03-04

## 2023-12-13 ENCOUNTER — OFFICE VISIT (OUTPATIENT)
Dept: OPHTHALMOLOGY | Facility: CLINIC | Age: 38
End: 2023-12-13
Attending: OPHTHALMOLOGY
Payer: COMMERCIAL

## 2023-12-13 DIAGNOSIS — E11.9 TYPE 2 DIABETES MELLITUS WITHOUT RETINOPATHY (H): ICD-10-CM

## 2023-12-13 DIAGNOSIS — H25.013 CORTICAL SENILE CATARACT OF BOTH EYES: ICD-10-CM

## 2023-12-13 DIAGNOSIS — H11.153 PINGUECULA OF BOTH EYES: ICD-10-CM

## 2023-12-13 DIAGNOSIS — C69.01 MALIGNANT NEOPLASM OF RIGHT CONJUNCTIVA (H): Primary | ICD-10-CM

## 2023-12-13 PROCEDURE — 99024 POSTOP FOLLOW-UP VISIT: CPT | Performed by: OPHTHALMOLOGY

## 2023-12-13 PROCEDURE — G0463 HOSPITAL OUTPT CLINIC VISIT: HCPCS | Performed by: OPHTHALMOLOGY

## 2023-12-13 ASSESSMENT — TONOMETRY
OS_IOP_MMHG: 21
OD_IOP_MMHG: 21
IOP_METHOD: ICARE

## 2023-12-13 ASSESSMENT — VISUAL ACUITY
METHOD: SNELLEN - LINEAR
OS_SC: 20/20
OD_SC: 20/20

## 2023-12-13 ASSESSMENT — SLIT LAMP EXAM - LIDS
COMMENTS: NORMAL
COMMENTS: NORMAL

## 2023-12-13 NOTE — PROGRESS NOTES
HPI       Post Op (Ophthalmology) Right Eye    In right eye.  Since onset it is gradually improving.  Associated symptoms include dryness, redness and foreign body sensation.  Negative for eye pain.  Treatments tried include eye drops.  Pain was noted as 0/10. Additional comments: Post Conjunctival Lesion Excision with AMT placement, right eye using Tisseel glue on11/13/2023.               Comments    Patient returns to clinic for a one month post operative right eye exam.  He tells me that his right eye is improving.   He is using Prednisolone acetate twice a day in his right eye.      DEBBIE Loya 10:26 AM  December 13, 2023                  Last edited by Sadia Morales COT on 12/13/2023 10:26 AM.         Interval hx: Patient returns to clinic for a one month post operative right eye exam.  He tells me that his right eye is improving.   He is using Prednisolone acetate twice a day in his right eye.           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 The horizontal margins in the plane of section are uninvolved. No invasive lesion is identified.    12/13/23: POM1 right conjunctival excision.no recurrence. 3 remaining vycril sutures    Plan:  - Discussed diagnosis with patient  -stop treatment  Follow up to check for recurrence  To keep monitoring left eye for signs of progression    Other conditions followed with Dr Awad  #Type 2 diabetes mellitus without complication, without long-term current use of insulin (H)  Diagnosed 2021  Most recent HgBA1c 8.1 on 4/10/23  No background diabetic retinopathy or neovascularization noted on today's exam.  Discussed ocular and systemic benefits of blood pressure and blood  sugar control.  Return in 1 year for full exam with dilation or sooner if changes to vision.      #Cortical cataract, both eyes  Mild cataracts are present . No treatment currently recommended. The patient will monitor for vision changes and contact us with any decrease in vision. Recheck in one year.     RTC 6 month VT, sooner if new concerns    Attending Physician Attestation:  Complete documentation of historical and exam elements from today's encounter can be found in the full encounter summary report (not reduplicated in this progress note).  I personally obtained the chief complaint(s) and history of present illness.  I confirmed and edited as necessary the review of systems, past medical/surgical history, family history, social history, and examination findings as documented by others; and I examined the patient myself.  I personally reviewed the relevant tests, images, and reports as documented above.  I formulated and edited as necessary the assessment and plan and discussed the findings and management plan with the patient and family. - Dylan Bell MD

## 2023-12-13 NOTE — NURSING NOTE
Chief Complaints and History of Present Illnesses   Patient presents with    Post Op (Ophthalmology) Right Eye     Post Conjunctival Lesion Excision with AMT placement, right eye using Tisseel glue on11/13/2023.       Chief Complaint(s) and History of Present Illness(es)       Post Op (Ophthalmology) Right Eye              Laterality: right eye    Course: gradually improving    Associated symptoms: dryness, redness and foreign body sensation.  Negative for eye pain    Treatments tried: eye drops    Pain scale: 0/10    Comments: Post Conjunctival Lesion Excision with AMT placement, right eye using Tisseel glue on11/13/2023.                Comments    Patient returns to clinic for a one month post operative right eye exam.  He tells me that his right eye is improving.   He is using Prednisolone acetate twice a day in his right eye.      Sadia Morales, DEBBIE 10:26 AM  December 13, 2023

## 2024-03-03 DIAGNOSIS — E11.9 TYPE 2 DIABETES MELLITUS WITHOUT COMPLICATION, WITHOUT LONG-TERM CURRENT USE OF INSULIN (H): ICD-10-CM

## 2024-03-04 RX ORDER — LISINOPRIL 5 MG/1
TABLET ORAL
Qty: 90 TABLET | Refills: 1 | Status: SHIPPED | OUTPATIENT
Start: 2024-03-04 | End: 2024-08-19

## 2024-05-23 DIAGNOSIS — E11.9 TYPE 2 DIABETES MELLITUS WITHOUT RETINOPATHY (H): ICD-10-CM

## 2024-05-23 RX ORDER — GLIPIZIDE 10 MG/1
10 TABLET, FILM COATED, EXTENDED RELEASE ORAL DAILY
Qty: 90 TABLET | Refills: 0 | Status: SHIPPED | OUTPATIENT
Start: 2024-05-23 | End: 2024-06-18

## 2024-05-29 DIAGNOSIS — E11.9 TYPE 2 DIABETES MELLITUS WITHOUT COMPLICATION, WITHOUT LONG-TERM CURRENT USE OF INSULIN (H): ICD-10-CM

## 2024-05-29 RX ORDER — METFORMIN HCL 500 MG
1000 TABLET, EXTENDED RELEASE 24 HR ORAL 2 TIMES DAILY WITH MEALS
Qty: 120 TABLET | Refills: 0 | Status: SHIPPED | OUTPATIENT
Start: 2024-05-29 | End: 2024-07-17

## 2024-05-30 NOTE — TELEPHONE ENCOUNTER
Lvm and call back number to schedule diabetic check. 1st attempt    Maureen Orozco MA    5/30/2024  2:40 PM

## 2024-05-30 NOTE — TELEPHONE ENCOUNTER
Short refill done.   Further refills after diabetes follow up due at this time    Please call the patient and schedule VV of diabetes followup with me, which patient is due at this time, to further take care of the medical conditions and medications.OK to use same day slot / double book near another virtual slot in 2-3 days.     Thank you,  Melissa Lam MD on 5/29/2024

## 2024-06-11 ENCOUNTER — VIRTUAL VISIT (OUTPATIENT)
Dept: FAMILY MEDICINE | Facility: CLINIC | Age: 39
End: 2024-06-11
Payer: COMMERCIAL

## 2024-06-11 DIAGNOSIS — E11.9 TYPE 2 DIABETES MELLITUS WITHOUT RETINOPATHY (H): Primary | ICD-10-CM

## 2024-06-11 DIAGNOSIS — C69.01 MALIGNANT NEOPLASM OF RIGHT CONJUNCTIVA (H): ICD-10-CM

## 2024-06-11 PROCEDURE — 99214 OFFICE O/P EST MOD 30 MIN: CPT | Mod: 95 | Performed by: INTERNAL MEDICINE

## 2024-06-11 NOTE — PROGRESS NOTES
Blanca is a 39 year old who is being evaluated via a billable video visit.    How would you like to obtain your AVS? Mail a copy  If the video visit is dropped, the invitation should be resent by: Text to cell phone: [phone]  Will anyone else be joining your video visit? No        Assessment and Plan  1. Type 2 diabetes mellitus without retinopathy (H)  Chronic problem, Uncontrolled. Last A1c in 10/2023 at that time showing 7.7% though mildly improved in the past remaining abnormal.  Last vitamin D deficiency as well as elevated LFTs.  Patient glipizide was adjusted at that time to 10 mg daily which was previously 5 milligrams.  Patient is also on metformin 1000 mg twice daily, gabapentin and lisinopril.  UPDATE - Mildly worsened to 8.8% compared to the past inspite of current Metformin 1000 mg XR twice daily as well as Glipizide 10 mg daily. Recommend to add a 5 mg XR Glipizide in evenings.   - Hemoglobin A1c; Future  - glipiZIDE (GLUCOTROL XL) 5 MG 24 hr tablet; Take 1 tablet (5 mg) by mouth daily In evenings in addition to current 10 mg in morning time  Dispense: 90 tablet; Refill: 1  - glipiZIDE (GLUCOTROL XL) 10 MG 24 hr tablet; Take 1 tablet (10 mg) by mouth daily In mornings in addition to current 5 mg in evenings  Dispense: 90 tablet; Refill: 1      2. Malignant neoplasm of right conjunctiva (H)  Chronic stable, continue to follow Ophthalmology recommendations. Reviewed the last visit with them in 12/2023 and pt does have upcoming appt with them on the same. Pt understood and agreed with the plan         Please note that this note consists of symbols derived from keyboarding, dictation and/or voice recognition software. As a result, there may be errors in the script that have gone undetected. Please consider this when interpreting information found in this chart.    Patient Instructions   As discussed, please make a LAB only appointment for the diabetes check ordered for further adjustment on medications  if needed. No need of fasting.   Return in about 4 months (around 10/10/2024), or if symptoms worsen or fail to improve, for Preventative Visit.    Melissa Lam MD  [facility] KELLY Rios is a 39 year old, presenting for the following health issues:  Diabetes and Recheck Medication        6/11/2024    10:46 AM   Additional Questions   Roomed by Maureen     History of Present Illness       Diabetes:   He presents for follow up of diabetes.  He is checking home blood glucose a few times a month.   He checks blood glucose before meals.  Blood glucose is never over 200 and sometimes under 70. He is aware of hypoglycemia symptoms including shakiness and dizziness.    He has no concerns regarding his diabetes at this time.   He is not experiencing numbness or burning in feet, excessive thirst, blurry vision, weight changes or redness, sores or blisters on feet.           He eats 0-1 servings of fruits and vegetables daily.He consumes 1 sweetened beverage(s) daily.He exercises with enough effort to increase his heart rate 9 or less minutes per day.  He exercises with enough effort to increase his heart rate 3 or less days per week.   He is taking medications regularly.       Medication Followup of metformin  Taking Medication as prescribed: yes  Side Effects:  None  Medication Helping Symptoms:  yes    Last seen patient in October 23 for annual physical at that time, he is here for diabetes follow up .      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 100 each 3    atorvastatin (LIPITOR) 20 MG tablet Take 1 tablet (20 mg) by mouth daily 90 tablet 3    blood glucose (NO BRAND SPECIFIED) test strip Use to test blood sugar 1 times daily or as directed. To accompany: Blood Glucose Monitor Brands: per insurance. 100 strip 0    blood glucose calibration (NO BRAND SPECIFIED) solution To accompany: Blood Glucose Monitor Brands: per insurance. 1 Bottle 3    blood glucose monitoring (NO BRAND SPECIFIED) meter device kit Use to test blood sugar 1 times daily or as directed. Preferred blood glucose meter OR supplies to accompany: Blood Glucose Monitor Brands: per insurance. 1 kit 0    cyclobenzaprine (FLEXERIL) 10 MG tablet Take 1 tablet (10 mg) by mouth 3 times daily as needed for muscle spasms 30 tablet 2    gabapentin (NEURONTIN) 100 MG capsule Take 1 capsule (100 mg) by mouth 3 times daily 90 capsule 1    glipiZIDE (GLUCOTROL XL) 10 MG 24 hr tablet TAKE 1 TABLET(10 MG) BY MOUTH DAILY 90 tablet 0    lisinopril (ZESTRIL) 5 MG tablet TAKE 1 TABLET(5 MG) BY MOUTH DAILY 90 tablet 1    meloxicam (MOBIC) 15 MG tablet Take 1 tablet daily as needed for back pain 30 tablet 1    metFORMIN (GLUCOPHAGE XR) 500 MG 24 hr tablet TAKE 2 TABLETS(1000 MG) BY MOUTH TWICE DAILY WITH MEALS 120 tablet 0    ofloxacin (OCUFLOX) 0.3 % ophthalmic solution Place 1 drop into the right eye 4 times daily 5 mL 1    prednisoLONE acetate (PRED FORTE) 1 % ophthalmic suspension Place 1 drop into the right eye 4 times daily 5 mL 1    thin (NO BRAND SPECIFIED) lancets Use with lanceting device. To accompany: Blood Glucose Monitor Brands: per insurance. 100 each 6     No current facility-administered medications for this  visit.          Review of Systems  Constitutional, HEENT, cardiovascular, pulmonary, GI, , musculoskeletal, neuro, skin, endocrine and psych systems are negative, except as otherwise noted.      Objective           Vitals:  No vitals were obtained today due to virtual visit.    Physical Exam   GENERAL: alert and no distress  EYES: Eyes grossly normal to inspection.  No discharge or erythema, or obvious scleral/conjunctival abnormalities.  RESP: No audible wheeze, cough, or visible cyanosis.    SKIN: Visible skin clear. No significant rash, abnormal pigmentation or lesions.  NEURO: Cranial nerves grossly intact.  Mentation and speech appropriate for age.  PSYCH: Appropriate affect, tone, and pace of words      Video-Visit Details    Type of service:  Video Visit   Originating Location (pt. Location): Home    Distant Location (provider location):  On-site  Platform used for Video Visit: Kimberly  Signed Electronically by: Melissa Lam MD

## 2024-06-11 NOTE — PROGRESS NOTES
"Blanca is a 39 year old who is being evaluated via a billable telephone visit.    What phone number would you like to be contacted at? [phone]   How would you like to obtain your AVS? Jong  Originating Location (pt. Location): {patient location:399122::\"Home\"}  {PROVIDER LOCATION On-site should be selected for visits conducted from your clinic location or adjoining [facility] hospital, academic office, or other nearby [facility] building. Off-site should be selected for all other provider locations, including home:529247}  Distant Location (provider location):  {virtual location provider:998807}    {PROVIDER CHARTING PREFERENCE:098213}    Subjective   Blanca is a 39 year old, presenting for the following health issues:  Diabetes and Recheck Medication        6/11/2024    10:46 AM   Additional Questions   Roomed by Maureen     History of Present Illness       Diabetes:   He presents for follow up of diabetes.  He is checking home blood glucose a few times a month.   He checks blood glucose before meals.  Blood glucose is never over 200 and sometimes under 70. He is aware of hypoglycemia symptoms including shakiness and dizziness.    He has no concerns regarding his diabetes at this time.   He is not experiencing numbness or burning in feet, excessive thirst, blurry vision, weight changes or redness, sores or blisters on feet.           He eats 0-1 servings of fruits and vegetables daily.He consumes 1 sweetened beverage(s) daily.He exercises with enough effort to increase his heart rate 9 or less minutes per day.  He exercises with enough effort to increase his heart rate 3 or less days per week.   He is taking medications regularly.       Medication Followup of metformin  Taking Medication as prescribed: yes  Side Effects:  None  Medication Helping Symptoms:  yes  {additonal problems for provider to add (Optional):273291}    {ROS Picklists (Optional):757157}      Objective           Vitals:  No vitals were obtained today due " to virtual visit.    Physical Exam   General: Alert and no distress //Respiratory: No audible wheeze, cough, or shortness of breath // Psychiatric:  Appropriate affect, tone, and pace of words      {Diagnostic Test Results (Optional):648167}      Phone call duration: *** minutes  Signed Electronically by: Melissa Lam MD  {Email feedback regarding this note to [email or web address]   :[id number]}

## 2024-06-12 ENCOUNTER — OFFICE VISIT (OUTPATIENT)
Dept: OPHTHALMOLOGY | Facility: CLINIC | Age: 39
End: 2024-06-12
Attending: OPHTHALMOLOGY
Payer: COMMERCIAL

## 2024-06-12 DIAGNOSIS — C69.01 MALIGNANT NEOPLASM OF RIGHT CONJUNCTIVA (H): Primary | ICD-10-CM

## 2024-06-12 DIAGNOSIS — H11.153 PINGUECULA OF BOTH EYES: ICD-10-CM

## 2024-06-12 PROCEDURE — G0463 HOSPITAL OUTPT CLINIC VISIT: HCPCS | Performed by: OPHTHALMOLOGY

## 2024-06-12 PROCEDURE — 99213 OFFICE O/P EST LOW 20 MIN: CPT | Performed by: OPHTHALMOLOGY

## 2024-06-12 ASSESSMENT — TONOMETRY
OS_IOP_MMHG: 26
OD_IOP_MMHG: 21
OD_IOP_MMHG: 22
OS_IOP_MMHG: 18
IOP_METHOD: ICARE
IOP_METHOD: TONOPEN

## 2024-06-12 ASSESSMENT — VISUAL ACUITY
OS_SC+: -2
OS_SC: 20/20
OD_SC: 20/20
METHOD: SNELLEN - LINEAR

## 2024-06-12 ASSESSMENT — SLIT LAMP EXAM - LIDS
COMMENTS: NORMAL
COMMENTS: NORMAL

## 2024-06-12 NOTE — PROGRESS NOTES
HPI       Follow Up    In both eyes.  Associated symptoms include Negative for flashes and floaters.  Treatments tried include no treatments.  Pain was noted as 0/10. Additional comments: Follow up cornea visit.             Comments    The patient notes he is doing well.  He has stable vision.  ASHLEY Samuels COA 10:03 AM 06/12/2024            Last edited by Farrah Leahy COA on 6/12/2024 10:03 AM.      Interval hx 06/12/2024  Chief Complaint(s) and History of Present Illness(es)       Follow Up    In both eyes.  Associated symptoms include Negative for flashes and floaters.  Treatments tried include no treatments.  Pain was noted as 0/10. Additional comments: Follow up cornea visit.             Comments    The patient notes he is doing well.  He has stable vision.  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 The horizontal margins in the plane of section are uninvolved. No invasive lesion is identified.    12/13/23: POM1 right conjunctival excision.no recurrence. 3 remaining vycril sutures  6/12/24: no recurrence of the lesion clear cornea, left eye stable look    Plan:  No recurrence  Keep watching for another 6M    Other conditions followed with Dr Awad  #Type 2 diabetes mellitus without complication, without long-term current use of insulin (H)  Diagnosed 2021  Most recent HgBA1c 8.1 on 4/10/23  No background diabetic retinopathy or neovascularization noted on today's exam.  Discussed ocular and systemic benefits of blood pressure and blood sugar control.  Return in 1 year for full exam with dilation or sooner if changes to vision.       #Cortical cataract, both eyes  Mild cataracts are present . No treatment currently recommended. The patient will monitor for vision changes and contact us with any decrease in vision. Recheck in one year.     RTC 6 month VTD, sooner if new concerns    Attending Physician Attestation:  Complete documentation of historical and exam elements from today's encounter can be found in the full encounter summary report (not reduplicated in this progress note).  I personally obtained the chief complaint(s) and history of present illness.  I confirmed and edited as necessary the review of systems, past medical/surgical history, family history, social history, and examination findings as documented by others; and I examined the patient myself.  I personally reviewed the relevant tests, images, and reports as documented above.  I formulated and edited as necessary the assessment and plan and discussed the findings and management plan with the patient and family. - Dylan Bell MD

## 2024-06-12 NOTE — NURSING NOTE
Chief Complaints and History of Present Illnesses   Patient presents with    Follow Up     Follow up cornea visit.     Chief Complaint(s) and History of Present Illness(es)       Follow Up              Laterality: both eyes    Associated symptoms: Negative for flashes and floaters    Treatments tried: no treatments    Pain scale: 0/10    Comments: Follow up cornea visit.              Comments    The patient notes he is doing well.  He has stable vision.  Farrah Leahy, COA, COA 10:03 AM 06/12/2024

## 2024-06-18 ENCOUNTER — LAB (OUTPATIENT)
Dept: LAB | Facility: CLINIC | Age: 39
End: 2024-06-18
Payer: COMMERCIAL

## 2024-06-18 DIAGNOSIS — E11.9 TYPE 2 DIABETES MELLITUS WITHOUT RETINOPATHY (H): ICD-10-CM

## 2024-06-18 LAB — HBA1C MFR BLD: 8.8 % (ref 0–5.6)

## 2024-06-18 PROCEDURE — 36415 COLL VENOUS BLD VENIPUNCTURE: CPT

## 2024-06-18 PROCEDURE — 83036 HEMOGLOBIN GLYCOSYLATED A1C: CPT

## 2024-06-18 RX ORDER — GLIPIZIDE 5 MG/1
5 TABLET, FILM COATED, EXTENDED RELEASE ORAL DAILY
Qty: 90 TABLET | Refills: 1 | Status: SHIPPED | OUTPATIENT
Start: 2024-06-18

## 2024-06-18 RX ORDER — GLIPIZIDE 10 MG/1
10 TABLET, FILM COATED, EXTENDED RELEASE ORAL DAILY
Qty: 90 TABLET | Refills: 1 | Status: SHIPPED | OUTPATIENT
Start: 2024-06-18

## 2024-06-19 ENCOUNTER — TELEPHONE (OUTPATIENT)
Dept: FAMILY MEDICINE | Facility: CLINIC | Age: 39
End: 2024-06-19
Payer: COMMERCIAL

## 2024-06-19 NOTE — TELEPHONE ENCOUNTER
----- Message from Melissa Lam sent at 6/18/2024 11:43 PM CDT -----  Your diabetes levels are mildly worsened compared to the past inspite of your current Metformin 1000 mg XR twice daily as well as Glipizide 10 mg daily. Recommend to add a 5 mg Glipizide in evenings.     Pt  to schedule Annual physical due in 10/4/2024 for rechecking labs with above changes and do further recommendations.      Thank you  Melissa Lam MD on 6/18/2024

## 2024-06-19 NOTE — TELEPHONE ENCOUNTER
Triage Patient Outreach    Attempt # 1    Was call answered?  No.  Left voicemail to return call to Triage at [facility]    Mary Gonzalez RN

## 2024-06-20 ENCOUNTER — APPOINTMENT (OUTPATIENT)
Dept: INTERPRETER SERVICES | Facility: CLINIC | Age: 39
End: 2024-06-20
Payer: COMMERCIAL

## 2024-06-20 NOTE — TELEPHONE ENCOUNTER
Spoke to patient and gave him the lab results and provider comment comments. Scheduled appointment.       Mary Gonzalez RN  [facility]

## 2024-07-17 DIAGNOSIS — E11.9 TYPE 2 DIABETES MELLITUS WITHOUT COMPLICATION, WITHOUT LONG-TERM CURRENT USE OF INSULIN (H): ICD-10-CM

## 2024-07-17 RX ORDER — METFORMIN HCL 500 MG
1000 TABLET, EXTENDED RELEASE 24 HR ORAL 2 TIMES DAILY WITH MEALS
Qty: 120 TABLET | Refills: 5 | Status: SHIPPED | OUTPATIENT
Start: 2024-07-17

## 2024-08-17 DIAGNOSIS — E11.9 TYPE 2 DIABETES MELLITUS WITHOUT COMPLICATION, WITHOUT LONG-TERM CURRENT USE OF INSULIN (H): ICD-10-CM

## 2024-08-19 RX ORDER — LISINOPRIL 5 MG/1
TABLET ORAL
Qty: 90 TABLET | Refills: 1 | Status: SHIPPED | OUTPATIENT
Start: 2024-08-19

## 2024-08-19 NOTE — TELEPHONE ENCOUNTER
Prescription approved per [facility] Refill Protocol.  Ashley Fuentes RN  [facility]

## 2024-09-30 ENCOUNTER — OFFICE VISIT (OUTPATIENT)
Dept: OPHTHALMOLOGY | Facility: CLINIC | Age: 39
End: 2024-09-30
Payer: COMMERCIAL

## 2024-09-30 DIAGNOSIS — D09.21 SQUAMOUS CELL CARCINOMA IN SITU (SCCIS) OF RIGHT CONJUNCTIVA: Primary | ICD-10-CM

## 2024-09-30 DIAGNOSIS — H25.013 CORTICAL SENILE CATARACT OF BOTH EYES: ICD-10-CM

## 2024-09-30 DIAGNOSIS — H52.03 HYPEROPIA OF BOTH EYES: ICD-10-CM

## 2024-09-30 DIAGNOSIS — E11.9 TYPE 2 DIABETES MELLITUS WITHOUT RETINOPATHY (H): ICD-10-CM

## 2024-09-30 PROCEDURE — 92014 COMPRE OPH EXAM EST PT 1/>: CPT | Performed by: OPHTHALMOLOGY

## 2024-09-30 ASSESSMENT — CONF VISUAL FIELD
OD_NORMAL: 1
OS_SUPERIOR_TEMPORAL_RESTRICTION: 0
OS_INFERIOR_TEMPORAL_RESTRICTION: 0
OD_SUPERIOR_NASAL_RESTRICTION: 0
OS_SUPERIOR_NASAL_RESTRICTION: 0
OD_INFERIOR_NASAL_RESTRICTION: 0
OD_INFERIOR_TEMPORAL_RESTRICTION: 0
OS_NORMAL: 1
OS_INFERIOR_NASAL_RESTRICTION: 0
METHOD: COUNTING FINGERS
OD_SUPERIOR_TEMPORAL_RESTRICTION: 0

## 2024-09-30 ASSESSMENT — VISUAL ACUITY
METHOD_MR_RETINOSCOPY: 1
OD_SC: 20/20
OS_SC: 20/20
METHOD: SNELLEN - LINEAR

## 2024-09-30 ASSESSMENT — CUP TO DISC RATIO
OS_RATIO: 0.4
OD_RATIO: 0.4

## 2024-09-30 ASSESSMENT — REFRACTION_MANIFEST
OS_SPHERE: +0.50
OS_CYLINDER: SPHERE
OD_SPHERE: +1.00
OD_CYLINDER: SPHERE

## 2024-09-30 ASSESSMENT — SLIT LAMP EXAM - LIDS
COMMENTS: NORMAL
COMMENTS: NORMAL

## 2024-09-30 ASSESSMENT — TONOMETRY
OS_IOP_MMHG: 21
IOP_METHOD: ICARE
OD_IOP_MMHG: 21

## 2024-09-30 NOTE — PROGRESS NOTES
HPI       Eye Exam For Diabetes    In both eyes.  Since onset it is stable.  Associated symptoms include Negative for eye pain, flashes and floaters.  Treatments tried include no treatments.             Comments    Blanca Zee is a(n) 39 year old male who presents for a diabetic exam. Since last exam, vision is about the same. No lubricating drops. No flashes and floaters. No eye pain.     Lab Results       Component                Value               Date                       A1C                      8.8                 06/18/2024                 A1C                      7.7                 10/04/2023                 A1C                      8.1                 04/10/2023                 A1C                      7.5                 08/30/2022                 A1C                      7.3                 10/20/2021                 A1C                      10.2                03/09/2021              Lino Nieves COT 10:07 AM September 30, 2024               Last edited by Lino Nieves on 9/30/2024 10:08 AM.         Review of systems for the eyes was negative other than the pertinent positives/negatives listed in the HPI.      Assessment & Plan    HPI:  Blanca Zee is a 39 year old male with history of T2DM, HTN, HLD, OSSN right eye s/p excision presents for annual dilated fundus exam. No acute complaints today.      POHx: OSSN  PMHx: T2DM  Current Medications:   Current Outpatient Medications   Medication Sig Dispense Refill    alcohol swab prep pads Use to swab area of injection/yazmin as directed. 100 each 3    atorvastatin (LIPITOR) 20 MG tablet Take 1 tablet (20 mg) by mouth daily 90 tablet 3    blood glucose (NO BRAND SPECIFIED) test strip Use to test blood sugar 1 times daily or as directed. To accompany: Blood Glucose Monitor Brands: per insurance. 100 strip 0    blood glucose calibration (NO BRAND SPECIFIED) solution To accompany: Blood Glucose Monitor Brands: per insurance. 1 Bottle 3    blood glucose  monitoring (NO BRAND SPECIFIED) meter device kit Use to test blood sugar 1 times daily or as directed. Preferred blood glucose meter OR supplies to accompany: Blood Glucose Monitor Brands: per insurance. 1 kit 0    cyclobenzaprine (FLEXERIL) 10 MG tablet Take 1 tablet (10 mg) by mouth 3 times daily as needed for muscle spasms 30 tablet 2    gabapentin (NEURONTIN) 100 MG capsule Take 1 capsule (100 mg) by mouth 3 times daily 90 capsule 1    glipiZIDE (GLUCOTROL XL) 10 MG 24 hr tablet Take 1 tablet (10 mg) by mouth daily In mornings in addition to current 5 mg in evenings 90 tablet 1    glipiZIDE (GLUCOTROL XL) 5 MG 24 hr tablet Take 1 tablet (5 mg) by mouth daily In evenings in addition to current 10 mg in morning time 90 tablet 1    lisinopril (ZESTRIL) 5 MG tablet TAKE 1 TABLET(5 MG) BY MOUTH DAILY 90 tablet 1    meloxicam (MOBIC) 15 MG tablet Take 1 tablet daily as needed for back pain 30 tablet 1    metFORMIN (GLUCOPHAGE XR) 500 MG 24 hr tablet TAKE 2 TABLETS(1000 MG) BY MOUTH TWICE DAILY WITH MEALS 120 tablet 5    thin (NO BRAND SPECIFIED) lancets Use with lanceting device. To accompany: Blood Glucose Monitor Brands: per insurance. 100 each 6    ofloxacin (OCUFLOX) 0.3 % ophthalmic solution Place 1 drop into the right eye 4 times daily 5 mL 1    prednisoLONE acetate (PRED FORTE) 1 % ophthalmic suspension Place 1 drop into the right eye 4 times daily 5 mL 1     No current facility-administered medications for this visit.     FHx: denies family history of ocular conditions   PSHx: OSSN S/p excision 11/13/2023 ([facility])       Current Eye Medications:      Assessment & Plan:  (E11.9) Type 2 diabetes mellitus without retinopathy (H)  (primary encounter diagnosis)  (E11.9) Type 2 diabetes mellitus without complication, without long-term current use of insulin (H)  Diagnosed 2021  Most recent HgBA1c 8.8 on 6/18/24  No background diabetic retinopathy or neovascularization noted on today's exam.  Discussed ocular and  systemic benefits of blood pressure and blood sugar control.  Return in 1 year for full exam with dilation or sooner if changes to vision.      (D09.21) 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   S/p excision 11/13/2023 ([facility])  Doing great without evidence of recurrence    Cortical cataract, both eyes  Mild cataracts are present . No treatment currently recommended. The patient will monitor for vision changes and contact us with any decrease in vision. Recheck in one year.     (H52.03) Hyperopia of both eyes  Patient has minimal hyperopia that does not require treatment at this time.         Return in about 1 year (around 9/30/2025) for Annual Visit-v/t/d/MRx.        Emile Awad MD     Attending Physician Attestation:  Complete documentation of historical and exam elements from today's encounter can be found in the full encounter summary report (not reduplicated in this progress note).  I personally obtained the chief complaint(s) and history of present illness.  I confirmed and edited as necessary the review of systems, past medical/surgical history, family history, social history, and examination findings as documented by others; and I examined the patient myself.  I personally reviewed the relevant tests, images, and reports as documented above.  I formulated and edited as necessary the assessment and plan and discussed the findings and management plan with the patient and family. - Emile Awad MD

## 2024-10-21 PROBLEM — D09.21: Status: ACTIVE | Noted: 2024-10-21

## 2024-10-21 PROBLEM — H25.013 CORTICAL SENILE CATARACT OF BOTH EYES: Status: ACTIVE | Noted: 2024-10-21

## 2024-10-22 ENCOUNTER — OFFICE VISIT (OUTPATIENT)
Dept: FAMILY MEDICINE | Facility: CLINIC | Age: 39
End: 2024-10-22
Payer: COMMERCIAL

## 2024-10-22 VITALS
HEART RATE: 77 BPM | SYSTOLIC BLOOD PRESSURE: 124 MMHG | BODY MASS INDEX: 27.36 KG/M2 | WEIGHT: 202 LBS | OXYGEN SATURATION: 96 % | HEIGHT: 72 IN | DIASTOLIC BLOOD PRESSURE: 82 MMHG | TEMPERATURE: 97.1 F

## 2024-10-22 DIAGNOSIS — E11.9 TYPE 2 DIABETES MELLITUS WITHOUT RETINOPATHY (H): ICD-10-CM

## 2024-10-22 DIAGNOSIS — H25.013 CORTICAL SENILE CATARACT OF BOTH EYES: ICD-10-CM

## 2024-10-22 DIAGNOSIS — D09.21 SQUAMOUS CELL CARCINOMA IN SITU (SCCIS) OF RIGHT CONJUNCTIVA: ICD-10-CM

## 2024-10-22 DIAGNOSIS — E78.5 DYSLIPIDEMIA: ICD-10-CM

## 2024-10-22 DIAGNOSIS — K21.9 GASTROESOPHAGEAL REFLUX DISEASE, UNSPECIFIED WHETHER ESOPHAGITIS PRESENT: ICD-10-CM

## 2024-10-22 DIAGNOSIS — E55.9 VITAMIN D DEFICIENCY: ICD-10-CM

## 2024-10-22 DIAGNOSIS — Z00.00 ROUTINE GENERAL MEDICAL EXAMINATION AT A HEALTH CARE FACILITY: Primary | ICD-10-CM

## 2024-10-22 DIAGNOSIS — E11.9 TYPE 2 DIABETES MELLITUS WITHOUT COMPLICATION, WITHOUT LONG-TERM CURRENT USE OF INSULIN (H): ICD-10-CM

## 2024-10-22 DIAGNOSIS — E78.1 HYPERTRIGLYCERIDEMIA: ICD-10-CM

## 2024-10-22 LAB
ALBUMIN SERPL BCG-MCNC: 4.3 G/DL (ref 3.5–5.2)
ALP SERPL-CCNC: 119 U/L (ref 40–150)
ALT SERPL W P-5'-P-CCNC: 18 U/L (ref 0–70)
ANION GAP SERPL CALCULATED.3IONS-SCNC: 15 MMOL/L (ref 7–15)
AST SERPL W P-5'-P-CCNC: 27 U/L (ref 0–45)
BILIRUB SERPL-MCNC: 0.4 MG/DL
BUN SERPL-MCNC: 7.5 MG/DL (ref 6–20)
CALCIUM SERPL-MCNC: 9.5 MG/DL (ref 8.8–10.4)
CHLORIDE SERPL-SCNC: 101 MMOL/L (ref 98–107)
CHOLEST SERPL-MCNC: 225 MG/DL
CREAT SERPL-MCNC: 0.91 MG/DL (ref 0.67–1.17)
CREAT UR-MCNC: 80.4 MG/DL
EGFRCR SERPLBLD CKD-EPI 2021: >90 ML/MIN/1.73M2
ERYTHROCYTE [DISTWIDTH] IN BLOOD BY AUTOMATED COUNT: 13 % (ref 10–15)
EST. AVERAGE GLUCOSE BLD GHB EST-MCNC: 194 MG/DL
FASTING STATUS PATIENT QL REPORTED: YES
FASTING STATUS PATIENT QL REPORTED: YES
GLUCOSE SERPL-MCNC: 181 MG/DL (ref 70–99)
HBA1C MFR BLD: 8.4 % (ref 0–5.6)
HCO3 SERPL-SCNC: 22 MMOL/L (ref 22–29)
HCT VFR BLD AUTO: 42.2 % (ref 40–53)
HDLC SERPL-MCNC: 29 MG/DL
HGB BLD-MCNC: 14.2 G/DL (ref 13.3–17.7)
LDLC SERPL CALC-MCNC: ABNORMAL MG/DL
LDLC SERPL DIRECT ASSAY-MCNC: 135 MG/DL
MCH RBC QN AUTO: 26.9 PG (ref 26.5–33)
MCHC RBC AUTO-ENTMCNC: 33.6 G/DL (ref 31.5–36.5)
MCV RBC AUTO: 80 FL (ref 78–100)
MICROALBUMIN UR-MCNC: <12 MG/L
MICROALBUMIN/CREAT UR: NORMAL MG/G{CREAT}
NONHDLC SERPL-MCNC: 196 MG/DL
PLATELET # BLD AUTO: 277 10E3/UL (ref 150–450)
POTASSIUM SERPL-SCNC: 4.1 MMOL/L (ref 3.4–5.3)
PROT SERPL-MCNC: 7.1 G/DL (ref 6.4–8.3)
RBC # BLD AUTO: 5.28 10E6/UL (ref 4.4–5.9)
SODIUM SERPL-SCNC: 138 MMOL/L (ref 135–145)
TRIGL SERPL-MCNC: 416 MG/DL
VIT D+METAB SERPL-MCNC: 13 NG/ML (ref 20–50)
WBC # BLD AUTO: 3.4 10E3/UL (ref 4–11)

## 2024-10-22 PROCEDURE — 80053 COMPREHEN METABOLIC PANEL: CPT | Performed by: INTERNAL MEDICINE

## 2024-10-22 PROCEDURE — 82570 ASSAY OF URINE CREATININE: CPT | Performed by: INTERNAL MEDICINE

## 2024-10-22 PROCEDURE — 99214 OFFICE O/P EST MOD 30 MIN: CPT | Mod: 25 | Performed by: INTERNAL MEDICINE

## 2024-10-22 PROCEDURE — 99395 PREV VISIT EST AGE 18-39: CPT | Performed by: INTERNAL MEDICINE

## 2024-10-22 PROCEDURE — 82043 UR ALBUMIN QUANTITATIVE: CPT | Performed by: INTERNAL MEDICINE

## 2024-10-22 PROCEDURE — 83721 ASSAY OF BLOOD LIPOPROTEIN: CPT | Mod: 59 | Performed by: INTERNAL MEDICINE

## 2024-10-22 PROCEDURE — 36415 COLL VENOUS BLD VENIPUNCTURE: CPT | Performed by: INTERNAL MEDICINE

## 2024-10-22 PROCEDURE — 99207 PR FOOT EXAM NO CHARGE: CPT | Mod: 25 | Performed by: INTERNAL MEDICINE

## 2024-10-22 PROCEDURE — 82306 VITAMIN D 25 HYDROXY: CPT | Performed by: INTERNAL MEDICINE

## 2024-10-22 PROCEDURE — 80061 LIPID PANEL: CPT | Performed by: INTERNAL MEDICINE

## 2024-10-22 PROCEDURE — 83036 HEMOGLOBIN GLYCOSYLATED A1C: CPT | Performed by: INTERNAL MEDICINE

## 2024-10-22 PROCEDURE — 85027 COMPLETE CBC AUTOMATED: CPT | Performed by: INTERNAL MEDICINE

## 2024-10-22 RX ORDER — GLIPIZIDE 10 MG/1
10 TABLET, FILM COATED, EXTENDED RELEASE ORAL 2 TIMES DAILY
Qty: 180 TABLET | Refills: 1 | Status: SHIPPED | OUTPATIENT
Start: 2024-10-22 | End: 2024-10-23

## 2024-10-22 RX ORDER — METFORMIN HYDROCHLORIDE 500 MG/1
1000 TABLET, EXTENDED RELEASE ORAL 2 TIMES DAILY WITH MEALS
Qty: 120 TABLET | Refills: 5 | Status: SHIPPED | OUTPATIENT
Start: 2024-10-22

## 2024-10-22 SDOH — HEALTH STABILITY: PHYSICAL HEALTH: ON AVERAGE, HOW MANY DAYS PER WEEK DO YOU ENGAGE IN MODERATE TO STRENUOUS EXERCISE (LIKE A BRISK WALK)?: 3 DAYS

## 2024-10-22 ASSESSMENT — SOCIAL DETERMINANTS OF HEALTH (SDOH): HOW OFTEN DO YOU GET TOGETHER WITH FRIENDS OR RELATIVES?: THREE TIMES A WEEK

## 2024-10-22 ASSESSMENT — PAIN SCALES - GENERAL: PAINLEVEL: NO PAIN (0)

## 2024-10-22 NOTE — PROGRESS NOTES
Preventive Care Visit  [facility] KELLY Lam MD, Internal Medicine  Oct 22, 2024            Assessment and Plan  1. Routine general medical examination at a health care facility    Last seen patient in June 2024 for diabetes follow-up and no changes at, does have risk factors of malignant lesion of right conjunctiva following ophthalmology.  Reviewed recent ophthalmology visit on September 30, 2024.    Last A1C in 6/2024 remaining uncontrolled at 8.8% though have been improving from 10.1% 3 years back - inspite of current medications  metformin 1000 mg twice daily, glipizide 10 mg every morning and 5 mg every afternoon.    Does have elevated alkaline phosphatase in the last lab work done in October 2023, dyslipidemia with HDL low though LDL at goal on current Lipitor, normal CBC at that time.  Will recheck at this time.      - REVIEW OF HEALTH MAINTENANCE PROTOCOL ORDERS  - HEMOGLOBIN A1C; Future  - Lipid panel reflex to direct LDL Non-fasting; Future  - Albumin Random Urine Quantitative with Creat Ratio; Future  - metFORMIN (GLUCOPHAGE XR) 500 MG 24 hr tablet; Take 2 tablets (1,000 mg) by mouth 2 times daily (with meals).  Dispense: 120 tablet; Refill: 5  - Comprehensive metabolic panel (BMP + Alb, Alk Phos, ALT, AST, Total. Bili, TP); Future  - CBC with platelets; Future  - HEMOGLOBIN A1C  - Lipid panel reflex to direct LDL Non-fasting  - Albumin Random Urine Quantitative with Creat Ratio  - Comprehensive metabolic panel (BMP + Alb, Alk Phos, ALT, AST, Total. Bili, TP)  - CBC with platelets    2. Type 2 diabetes mellitus without complication, without long-term current use of insulin (H)  Uncontrolled with A1C checked today remaining at 8.4% compared to past 8.8 though pt states he has been compliant on the medications as prescribed. Will consider adjusting his Glipizide further to 10 mg BID. Pt will be notified through RN call on the changes and emphasized on diabetes follow up.    - HEMOGLOBIN A1C; Future  - metFORMIN (GLUCOPHAGE XR) 500 MG 24 hr tablet; Take 2 tablets (1,000 mg) by mouth 2 times daily (with meals).  Dispense: 120 tablet; Refill: 5  - FOOT EXAM  - HEMOGLOBIN A1C  - glipiZIDE (GLUCOTROL XL) 10 MG 24 hr tablet; Take 1 tablet (10 mg) by mouth 2 times daily. Dispense: 180 tablet; Refill: 1    3. Type 2 diabetes mellitus without retinopathy (H)  - HEMOGLOBIN A1C; Future  - Lipid panel reflex to direct LDL Non-fasting; Future  - Albumin Random Urine Quantitative with Creat Ratio; Future  - FOOT EXAM  - HEMOGLOBIN A1C  - Lipid panel reflex to direct LDL Non-fasting  - Albumin Random Urine Quantitative with Creat Ratio  - glipiZIDE (GLUCOTROL XL) 10 MG 24 hr tablet; Take 1 tablet (10 mg) by mouth 2 times daily. In mornings in addition to current 5 mg in evenings  Dispense: 180 tablet; Refill: 1    4. 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 Cortical senile cataract of both eyes  - CBC with platelets; Future  - CBC with platelets    6. Gastroesophageal reflux disease, unspecified whether esophagitis present  New problem, which pt endorses has been having intermittent flareups. Never got worked up or treated for this in the past . Will check for H.pylori and start on PPI emperically. Pt understood and agreed with the plan   - Comprehensive metabolic panel (BMP + Alb, Alk Phos, ALT, AST, Total. Bili, TP); Future  - CBC with platelets; Future  - Helicobacter pylori Antigen Stool; Future  - omeprazole (PRILOSEC) 20 MG DR capsule; Take 1 capsule (20 mg) by mouth daily.  Dispense: 30 capsule; Refill: 2  - Comprehensive metabolic panel (BMP + Alb, Alk Phos, ALT, AST, Total. Bili, TP)  - CBC with platelets  - Helicobacter pylori Antigen Stool    7. Vitamin D deficiency  Uncontrolled, sent in high dose to pharmacy  - Vitamin D deficiency screening; Future  - vitamin D2 (ERGOCALCIFEROL) 58417 units (1250 mcg) capsule; Take 1 capsule (50,000 Units) by mouth once a week.  " Dispense: 12 capsule; Refill: 0    8. Hypertriglyceridemia  9. Dyslipidemia  Uncontrolled, with TGLs in 400s . Will increase the dose of pt Lipitor to maximum dose for improvement.   - atorvastatin (LIPITOR) 80 MG tablet; Take 1 tablet (80 mg) by mouth daily.  Dispense: 90 tablet; Refill: 1           Please note that this note consists of symbols derived from keyboarding, dictation and/or voice recognition software. As a result, there may be errors in the script that have gone undetected. Please consider this when interpreting information found in this chart.    Patient Instructions   As discussed , please do fasting labs placed.     Refills will be taken care.     =============================    Patient Education  Talada Daryeelka Ka   Hortaga ah  Giovanni waa talo guud oo aan inta badan bixino si aan dadka uga caawino inay caafimaad qabaan. Kooxdaada daryeelka ayaa laga yaabaa inay kuu hayaan talo kuu gaar ah. Fadlan regan hadal kooxdaada daryeelka baahiyahaaga daryeelka ee ka hortaga.  Hab Nololeedka  Samee jimicsi ugu yaraan 150 daqiiqo todobaad kasta (30 daqiiqo maalintii, 5 maalmood todobaadkii).  Samee hawlaha xoojiya murqaha 2 maalmood todobaadkii. Aileenni waxay kaa caawinayaan xakamaynta miisaankaaga iyo ka hortaga cudurada.  Lama ogola sigaar cabista  Xiro muraayadaha qorraxda celiya si aad uga hortagto kansarka maqaarka.  Gurigaaga ha laga abundio gaaska radon 2 ilaa 5-tii sanaba mar. Radon waa gaas aan midab lahayn, oo aan ur lahayn oo wax yeeli rohit sambabadaada. Si aad wax badan uga ogaato, aad [email or web address].mn. oo raadi \"Radon in Homes.\"  Hay qoryaha iyagoo aan rasaas ku jirin oo ku xir goob badqab leh sida khasnadda badqab leh ama isticmaal khaanada qoryaha, oo qari furayaasha. Markasta si goaylin ah ugu quful khaanad rasaasta. Si aad wax badan uga ogaato, booqo dps.mn.gov oo raadi \"safe gun storage.\"  Nafaqada  Cun 5 cunto ama ka badan oo khudaar iyo herrera ah maalin kasta.  Isku day rootiga qamadiga ka " samaysan, bariiska buniga ah iyo baastada (badalkii rootiga cad, bariiska, iyo baasto).  Hel calcium iyo vitamin D kugu filan. Shaileshi calaamadda cuntooyinka oo ujeedo 100% ee RDA (kaalmada maalinlaha ah ee lagu taliyay).  Baaritaano joogta ah  Samee baaritaanka ilkaha iyo nadiifinta 6 bilood kasta.  Arag kooxdaada daryeelka caafimaadka sanad kasta si aad ugala hadasho:  Isbadal kasta oo ku yimaadda caafimaadkaaga.  Daawooyin kasta oo kooxdaada daryeelka kuu qoreen.  Daryeelka ka hortagga, qorshaynta dhalmada qoyska, iyo siyaabaha looga hortago cudurada daba dheeraada.  Talaalada (talaalo)   Talaalka HPV (ilaa da'da 26), haddii aadan waligaa hore u qaadanin.  Talaalaruna santamariaarshawga B (ilaa da'da 59),haddi aanad hore u qaadanin.  talaalaruna COVID-19: Qaado surendra [facility].  Surendra martínezbka: qaado surendra ordaz sannad kasta.  Surendra a: Qaado 10-ki sanaba mar.  Talaalka jpukiitada, cagaarshawa A, iyo Tallaalka RSV: Weydii kooxdaada daryeelka haddii aad u baahan tahay kuwaas oo ku salaysan khatarta aad ku jirto.  Talaalka busbusaruna (loogu talagaly da'da 50 iyo ka weyn).  Baaritaanada guud ee caafimaadka  Baaritaanka sokorowga:  Laga bilaabo da'da 35, Iska baar sokorowga ugu yaraan 3 sanaba mar.  Haddii aad ka michael tahay da'da 36, waydii kooxdaada daryeelka haddii ay tahay in [facility].  Baaritaanka [facility]: Da'da 39, bilow inaad iska bamahsao [facility] 5 sanaba mar, ama marar badan haddii lagu taliyey.  Baaritaanka Stephenna[facility] ([facility]): Da'da 51,Waydii kooxdaada daryeelka haddii ay tahay in [facility] baaritaanka [facility]).  Marleny C: Stacey gilmoreu yarachoco [facility] Hospitalda.  Baaritaanka god ku yaala Xididka Dhiiga ka Qaada Wadnaha: Xiomara hadal dhakhtarkaaga baaritaankchoco haddii aad:  Weligaa Sigaar ma cabtay; oo  Aadiga ma lab tahay; oo  da'da u dhaxayso 66 iyo 76.  Cudurada galmada [facility]  Kahor da'da 24:  Weydii kooxdaada daryeelka haddii  ay tahay in lag baaro Cudurada galmada lagu regan qaado ([facility]).  Kadib da'da 24: Iska baar Cudurada galmada lagu regan qaado ([facility]) haddii aad halis ugu jirto. Aad halis ugu jirto Cudurada galmada lagu regan qaado ([facility]Is) (oo ay ku jiraan HIV) haddii:  Hadii aad galmo la samaysay abbie qof ka badan.  Aadan isticmaalin cinjirka galmada wakhti kasta.  Adiga ama lamaanahaaga laga helay cudur lagu regan qaado galmada.  Hadii aad halis ugu jirto HIV, wax ka waydii daawada PrEP si aad uga hortagto HIV.  Iska baar HIV ugu yaraan abbie mar noloshaada, haddii aad halis ugu jirto HIV iyo haddii kale.  Baaritaanada [place]ans[place]a  Baaritaanada [place] ee Xubinta Taranka [facility]: Haddii aad dumar tahay, bilow inaad si joogto ah u hesho baaritaanka kansarka qeybta hore ee ilmo xubinta taranka da'da 21. Inta badan dadka sameeya baaritaanada caadiga ah ee leh natiijooyinka caadiga ah waxay joogsan karaan da'da 66 kadib. Midan regan hadal dhakhtarkaaga.  Baaritaanka kansarka naasaha (baaritaanka sawiritaanka ee kansarka naasaha): Haddii aad naaso leedahay, bilaw inaad ka baarto naasahaaga kansarka naasaha si joogto ahda'da 40. Kani waa baaritaan raajo oo lagu baaro kansarka naasaha.  Baaritaanka [place] estelaid-debbiea мария: Waa muhiim in la bilaabo baaritaanka kansarka xiid-maha waaweyn da'da 45.  Samee baaritaanka [facility] malawadka 10-ki sanaba mar (ama in ka badan haddii aad halis ugu jirto) [place], weydii bixiyahaaga baaritaanada saxarada sida imtixaanka FIT sanad walba ama baaritaanka Cologuard 3-dii sanaba mar.  Si aad wax badan uga barato sakshiiyaemilyada jose juan beckhamo: [email or web address].  Caawimaada goZakiaan aster small: bit.ly/zv93498.  [facility] Hospitaladi marYakima [facility] Memorial Hospitalka raga: Hadii aad raji tahay aad jirto da'da 55 ilaa 69, ka codso daryeel bixiyahaaga haddii aad ka faa'iidaysan lahayd baaritaanka [facility] Hospitaladi marYakima Valley Memorial Hospitalka ee sannadkiiba mar.  Baaritaanka [facility] Sambabada: Hadii aad hada sigaar cabto ama aad horaan u  cabaysay oo aad jirto da'da 50 ilaa 80, ka codso kooxdaada daryeelka haddii baaritaanka kansarka sambabada socda uu kugu habboon yabrookey.    Ujeeddooyin yassinein oo kaliya. Ma aha inay beddel u noqoto talada brucetagarrickkaaga. Xuquuqda daabacaadda   2023 [facility]. Dhamaan xuquuqdu way dhowrschoco beltrany. Waxaa caafimaad ahaan jose u eegay [facility] Barnaamijka Turjumaada [employer] [id number] - REV 04/24.     Return in about 6 months (around 4/22/2025), or if symptoms worsen or fail to improve, for diabetes, video visit.    Melissa Lam MD  [facility] KELLY Rios is a 39 year old, presenting for the following:  Physical (Fasting)        10/22/2024     7:03 AM   Additional Questions   Roomed by Patti CESPEDES        Health Care Directive  Patient does not have a Health Care Directive or Living Will: Discussed advance care planning with patient; however, patient declined at this time.    HPI    Diabetes Follow-up    How often are you checking your blood sugar? A few times a week  What time of day are you checking your blood sugars (select all that apply)?  After meals  Have you had any blood sugars above 200?  Yes sometimes  Have you had any blood sugars below 70?  No  What symptoms do you notice when your blood sugar is low?  None  What concerns do you have today about your diabetes? None   Do you have any of these symptoms? (Select all that apply)  No numbness or tingling in feet.  No redness, sores or blisters on feet.  No complaints of excessive thirst.  No reports of blurry vision.  No significant changes to weight.          Hyperlipidemia Follow-Up    Are you regularly taking any medication or supplement to lower your cholesterol?   Yes- atorvastatin  Are you having muscle aches or other side effects that you think could be caused by your cholesterol lowering medication?  No    Hypertension Follow-up    Do you check your blood pressure regularly outside of the  clinic? No   Are you following a low salt diet? No  Are your blood pressures ever more than 140 on the top number (systolic) OR more   than 90 on the bottom number (diastolic), for example 140/90? No    BP Readings from Last 2 Encounters:   10/22/24 124/82   11/13/23 108/67     Hemoglobin A1C (%)   Date Value   10/22/2024 8.4 (H)   06/18/2024 8.8 (H)   03/09/2021 10.2 (H)     LDL Cholesterol Calculated (mg/dL)   Date Value   10/04/2023 72   08/30/2022 85   03/01/2021 142 (H)           10/22/2024   General Health   How would you rate your overall physical health? Excellent   Feel stress (tense, anxious, or unable to sleep) Not at all            10/22/2024   Nutrition   Three or more servings of calcium each day? (!) I DON'T KNOW   Diet: Diabetic    I don't know   How many servings of fruit and vegetables per day? (!) 2-3   How many sweetened beverages each day? 0-1       Multiple values from one day are sorted in reverse-chronological order         10/22/2024   Exercise   Days per week of moderate/strenous exercise 3 days            10/22/2024   Social Factors   Frequency of gathering with friends or relatives Three times a week   Worry food won't last until get money to buy more No   Food not last or not have enough money for food? No   Do you have housing? (Housing is defined as stable permanent housing and does not include staying ouside in a car, in a tent, in an abandoned building, in an overnight shelter, or couch-surfing.) No   Are you worried about losing your housing? No   Lack of transportation? No   Unable to get utilities (heat,electricity)? No   Want help with housing or utility concern? No      (!) HOUSING CONCERN PRESENT      10/22/2024   Dental   Dentist two times every year? (!) NO            10/22/2024   TB Screening   Were you born outside of the [place]? Yes              Today's PHQ-2 Score:       6/11/2024    10:51 AM   PHQ-2 ( 1999 [employer])   Q1: Little interest or pleasure in doing things 0   Q2:  Feeling down, depressed or hopeless 0   PHQ-2 Score 0   Q1: Little interest or pleasure in doing things Not at all   Q2: Feeling down, depressed or hopeless Not at all   PHQ-2 Score 0         10/22/2024   Substance Use   Alcohol more than 3/day or more than 7/wk No   Do you use any other substances recreationally? No        Social History     Tobacco Use    Smoking status: Never    Smokeless tobacco: Never   Vaping Use    Vaping status: Never Used   Substance Use Topics    Alcohol use: Not Currently    Drug use: Not Currently           10/22/2024   STI Screening   New sexual partner(s) since last STI/HIV test? 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;  Surgeon: Dylan Bell MD;  Location: [facility] OR     Lab work is in process  Labs reviewed in EPIC  BP Readings from Last 3 Encounters:   10/22/24 124/82   11/13/23 108/67   11/09/23 125/81    Wt Readings from Last 3 Encounters:   10/22/24 91.6 kg (202 lb)   11/13/23 89.8 kg (198 lb)   11/09/23 91.2 kg (201 lb)                  Patient Active Problem List   Diagnosis    CARDIOVASCULAR SCREENING; LDL GOAL LESS THAN 160    Diabetes mellitus, type 2 (H)    Type 2 diabetes mellitus without retinopathy (H)    Pinguecula of both eyes    Malignant neoplasm of right conjunctiva (H)    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    Cortical senile cataract of both eyes     Past Surgical History:   Procedure Laterality Date    EXCISE LESION CONJUNCTIVAL Right  11/13/2023    Procedure: EXCISION, LESION, 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 100 each 3    atorvastatin (LIPITOR) 20 MG tablet Take 1 tablet (20 mg) by mouth daily 90 tablet 3    blood glucose (NO BRAND SPECIFIED) test strip Use to test blood sugar 1 times daily or as directed. To accompany: Blood Glucose Monitor Brands: per insurance. 100 strip 0    blood glucose calibration (NO BRAND SPECIFIED) solution To accompany: Blood Glucose Monitor Brands: per insurance. 1 Bottle 3    blood glucose monitoring (NO BRAND SPECIFIED) meter device kit Use to test blood sugar 1 times daily or as directed. Preferred blood glucose meter OR supplies to accompany: Blood Glucose Monitor Brands: per insurance. 1 kit 0    cyclobenzaprine (FLEXERIL) 10 MG tablet Take 1 tablet (10 mg) by mouth 3 times daily as needed for muscle spasms 30 tablet 2    gabapentin (NEURONTIN) 100 MG capsule Take 1 capsule (100 mg) by mouth 3 times daily 90 capsule 1    glipiZIDE (GLUCOTROL XL) 10 MG 24 hr tablet Take 1 tablet (10 mg) by mouth daily In mornings in addition to current 5 mg in evenings 90 tablet 1    glipiZIDE (GLUCOTROL XL) 5 MG 24 hr tablet Take 1 tablet (5 mg) by mouth daily In evenings in addition to current 10 mg in morning time 90 tablet 1    lisinopril (ZESTRIL) 5 MG tablet TAKE 1 TABLET(5 MG) BY MOUTH DAILY 90 tablet 1    meloxicam (MOBIC) 15 MG tablet Take 1 tablet daily as needed for back pain 30 tablet 1    metFORMIN (GLUCOPHAGE XR) 500 MG 24 hr tablet Take 2  "tablets (1,000 mg) by mouth 2 times daily (with meals). 120 tablet 5    ofloxacin (OCUFLOX) 0.3 % ophthalmic solution Place 1 drop into the right eye 4 times daily 5 mL 1    omeprazole (PRILOSEC) 20 MG DR capsule Take 1 capsule (20 mg) by mouth daily. 30 capsule 2    prednisoLONE acetate (PRED FORTE) 1 % ophthalmic suspension Place 1 drop into the right eye 4 times daily 5 mL 1    thin (NO BRAND SPECIFIED) lancets Use with lanceting device. To accompany: Blood Glucose Monitor Brands: per insurance. 100 each 6     No Known Allergies  Recent Labs   Lab Test 10/22/24  0736 06/18/24  1334 10/04/23  1450 04/10/23  1602 08/30/22  1437 07/27/21  1048 03/09/21  0918 03/01/21  1059   A1C 8.4* 8.8* 7.7*   < > 7.5*   < > 10.2*  --    LDL  --   --  72  --  85  --   --  142*   HDL  --   --  30*  --  33*  --   --  31*   TRIG  --   --  122  --  209*  --   --  360*   ALT  --   --  24  --  25  --  29 22   CR  --   --  0.91  --  0.92  --  0.90 0.84   GFRESTIMATED  --   --  >90  --  >90  --  >90 >90   GFRESTBLACK  --   --   --   --   --   --  >90 >90   POTASSIUM  --   --  4.1  --  4.4  --  4.6 4.0    < > = values in this interval not displayed.          Review of Systems  Constitutional, HEENT, cardiovascular, pulmonary, GI, , musculoskeletal, neuro, skin, endocrine and psych systems are negative, except as otherwise noted.     Objective    Exam  /82 (BP Location: Left arm, Patient Position: Sitting, Cuff Size: Adult Regular)   Pulse 77   Temp 97.1  F (36.2  C) (Temporal)   Ht 1.83 m (6' 0.05\")   Wt 91.6 kg (202 lb)   SpO2 96%   BMI 27.36 kg/m     Estimated body mass index is 27.36 kg/m  as calculated from the following:    Height as of this encounter: 1.83 m (6' 0.05\").    Weight as of this encounter: 91.6 kg (202 lb).    Physical Exam  GENERAL: alert and no distress  EYES: Eyes grossly normal to inspection, PERRL and conjunctivae and sclerae normal  HENT: ear canals and TM's normal, nose and mouth without ulcers or " lesions  NECK: no adenopathy, no asymmetry, masses, or scars  RESP: lungs clear to auscultation - no rales, rhonchi or wheezes  CV: regular rate and rhythm, normal S1 S2, no S3 or S4, no murmur, click or rub, no peripheral edema  ABDOMEN: soft, nontender, no hepatosplenomegaly, no masses and bowel sounds normal  MS: no gross musculoskeletal defects noted, no edema  SKIN: no suspicious lesions or rashes  NEURO: Normal strength and tone, mentation intact and speech normal  PSYCH: mentation appears normal, affect normal/bright    FOOT exam : Sensations on monofilament exam intact bilaterally. No ulcers or macerations on the foot.    Signed Electronically by: Melissa Lam MD

## 2024-10-22 NOTE — PATIENT INSTRUCTIONS
"As discussed , please do fasting labs placed.     Refills will be taken care.     =============================    Patient Education   Talada Daryeelka Ka   Hortaga ah  Giovanni waa talo guud oo aan inta badan bixino si aan dadka uga caawino inay caafimaad qabaan. Kooxdaada daryeelka ayaa laga yaabaa inay kuu hayaan talo kuu gaar ah. Fadlan regan hadal kooxdaada daryeelka baahiyahaaga daryeelka ee ka hortaga.  Hab Nololeedka  Samee jimicsi ugu yaraan 150 daqiiqo todobaad kasta (30 daqiiqo maalintii, 5 maalmood todobaadkii).  Samee hawlaha xoojiya murqaha 2 maalmood todobaadkii. Kuwani waxay kaa caawinayaan xakamaynta miisaankaaga iyo ka hortaga cudurada.  Lama ogola sigaar cabista  Xiro muraayadaha qorraxda celiya si aad uga hortagto kansarka maqaarka.  Gurigaaga ha laga abundio gaaska radon 2 ilaa 5-tii sanaba mar. Radon waa gaas aan midab lahayn, oo aan ur lahayn oo wax yeeli rohit sambabadaada. Si aad wax badan uga ogaato, aad [email or web address] oo raadi \"Radon in Homes.\"  Hay qoryaha iyagoo aan rasaas ku jirin oo ku xir goob badqab leh sida khasnadda badqab leh ama isticmaal khaanada qoryaha, oo qari furayaasha. Markasta si gooni ah ugu quful khaanad rasaasta. Si aad wax badan uga ogaato, booqo [email or web address] oo raadi \"safe gun storage.\"  Nafaqada  Cun 5 cunto ama ka badan oo khudaar iyo herrera ah maalin kasta.  Isku day rootiga qamadiga ka samaysan, bariiska buniga ah iyo baastada (badalkii rootiga cad, bariiska, iyo baasto).  Hel calcium iyo vitamin D kugu filan. Hubi calaamadda cuntooyinka oo ujeedo 100% ee RDA (kaalmada maalinlaha ah ee lagu talhoang).  Baaritaano joogta ah  Samee baaritaanka ilkaha iyo nadiifinta 6 bilood kasta.  Arag kooxdaada daryeelka caafimaadka sanad kasta si aad ugala hadasho:  Isbadal kasta oo ku yimaadda caafimaadkaaga.  Daianoobriannan kasta oo kooxdaada emilioyemarioka kuu qoreen.  Daryeelka ka hortagga, qorshaynta dhalmada qoyska, iyo siyaabaha looga hortago cudurada daba dheeraada.  Talaalada (kia) "   Ravindrazanelaruna HPV (ilaa da'da 26), haddii aadan waligaa hore u qaadanin.  Nissaaruna jennhawga B (ilaa da'da 59),haddi aanad hore u qaadanin.  surendra COVID-19: Qaado surendra marka ay dhamaato.  Nissaaruna martínezbaruna: qaado nissaaruna kowalskigabka sannad kasta.  Nissaaruna SerranoCritical access hospitalgelacio: Qaado 10-ki sanaba mar.  Ravindrazaneewa trammellukiitada, hinaarshawa A, iyo [place] RSV: Weydii kooxdaada daryeelka haddii aad u baahan tahay kuwaas oo ku salaysan khatarta aad ku jirto.  Ravindrazaneka busbusaruna (loogu talagaly da'da 51 iyo ka weyn).  [facility]aritaanada [facility]  Baaritaanka soSouth Shore Hospitalga:  Laga bilaabo da'da 35, Iska kristian sokorowga ugu yaraan 3 sanaba mar.  Haddii aad ka michael tahay da'da 35, waydii kooxdaada daryeelka haddii ay tahay in [facility].  Baaritaanka [facility]: Da'da 39, bilow inaad iska baMcIndoe Fallso Northwestern Medical Centerolka 5 sanaba mar, ama marar badchoco haddii lagu taliyey.  Baaritaanka Cufnaanta Lafta (DEXA): Da'da 51,Waydii kooxdaada daryeelka haddii ay tahay in [facility] baaritaanka jiMiddletown State Hospitalnaanta lafa).  Jennhowga C: Iska baar ugu yaraan abbie mar noloshaada.  Baaritaanka god ku yaala Xididka Dhiiga ka Qaada Wadnaha: Xiomara hadal dhakhtarkaaga baaritaankan haddii aad:  Weligaa Sigaar ma cabtay; oo  Aadiga ma lab tahay; oo  da'da u dhaxayso 66 iyo 76.  [facility] xiomara qaado (STIs)  Kahor da'da 24:  Weydii kooxdaada jayson haddii ay arminhay in [facility] baaro [facility] xiomara qaado (STIs).  Kadib da'da 24: Iska baar [facility] xiomara qaado (STIs) haddii aad halis ugu jirto. Aad halis ugu jirto [facility] xiomara qaado (STIs) (oo ay ku jiraan HIV) haddii:  Hadii aad galmo la samaysay abbie qof ka badan.  Aadan isticmaalin cinjirka galmada wakhshelley kasta.  Adiga ama lamaanahaaga laga helaSt. Joseph's Hospitalr [facility] xiomara qaado [facility].  Hadii aad halis ugu jirto HIV, wax ka waydii daawada PrEP si aad uga hortagto HIV.  Iska baar HIV ugu yaraan abbie mar noloshaada, haddii aad halis ugu jirto HIV iyo haddii  kale.  Baaritaanada [facility]  Baaritaanada [facility] ee Xubinta Taranka [facility]: Haddii aad dumar tahay, bilow inaad si joogto ah u hesho baaritaanka [facility] qeybta hore ee ilmo xubinta taranka da'da 21. Inta badan dadka sameeya baaritaanada caadiga ah ee leh natiijooyinka caadiga ah waxay joogsan karaan da'da 66 kadib. Midan regan hadal [facility].  Baaritaanka [facility] (baaritaanka sawiritaanka ee [facility]): Haddii aad naaso leedahay, bilaw inaad ka baarto naasahaaga [facility] naaha si joogto ahda'da 40. Kani waa baaritaan raajo oo lagu baaro [facility].  Baaritaanka [facility] xiid-maha waaweyn: Waa muhiim in la bilaabo [facility] xiid-[facility] waawey da'da 45.  Samee baaritaanka [facility] Centerawadka 10-ki sanaba mar (ama in ka badan haddii aad halis ugu jirto) [place], weydii bixiyahaaga baaritaanada saxarada sida imtixaanka FIT sanad walba ama baaritaanka Cologuard 3-dii sanaba mar.  Si aad wax badan uga barato ikhtiyaaradaada chapincito booqo: [email or web address].  Magaly gojose juan carvalhoo: [email or web address].  Baaritaanka [facility] Hospitaladi marPeaceHealth United General Medical Centerka raga: Hadii aad raji tahay aad jirto da'da 56 ilaa 69, ka codso daryeel bixiyahaaga haddii aad ka faa'iidaysan lahayd michaelaritaanka [facility] kaadi mareenaruna ee sannadkiiba mar.  aritaanka [facility]: Hadii aad hada sigaar cabto ama aad horaan u cabaysay oo aad jirto da'da 50 ilaa 80, ka codso kooxdaada daryeelka haddii baaritaanka [facility] Hospitala socda uu kugu habboon yahasaul.    Ujeeddooyin wargelin oo kaliya. Ma aha inay beddel u noqoto talada Critical access hospitaljosé miguel. Xuquuqda rhiannoncaevettea 2023 [facility]. [facility] Hospitalchoco gonzalezuqantionette ibanez. Waxaa caafimaad ahaan jose u eegay [facility] [employer] [id number] - REV 04/24.

## 2024-10-23 ENCOUNTER — TELEPHONE (OUTPATIENT)
Dept: FAMILY MEDICINE | Facility: CLINIC | Age: 39
End: 2024-10-23
Payer: COMMERCIAL

## 2024-10-23 RX ORDER — GLIPIZIDE 10 MG/1
10 TABLET, FILM COATED, EXTENDED RELEASE ORAL 2 TIMES DAILY
Qty: 180 TABLET | Refills: 1 | Status: SHIPPED | OUTPATIENT
Start: 2024-10-23

## 2024-10-23 RX ORDER — ATORVASTATIN CALCIUM 80 MG/1
80 TABLET, FILM COATED ORAL DAILY
Qty: 90 TABLET | Refills: 1 | Status: SHIPPED | OUTPATIENT
Start: 2024-10-23

## 2024-10-23 RX ORDER — ERGOCALCIFEROL 1.25 MG/1
50000 CAPSULE, LIQUID FILLED ORAL WEEKLY
Qty: 12 CAPSULE | Refills: 0 | Status: SHIPPED | OUTPATIENT
Start: 2024-10-23

## 2024-10-23 NOTE — TELEPHONE ENCOUNTER
----- Message from Melissa Lam sent at 10/23/2024 12:15 AM CDT -----  Your Cholesterol levels Triglycerides are severely abnormal which may cause complications of pancreatitis I have adjusted the dose of your current lipitor to a higher dose for improvement.     Your diabetes levels are remaining abnormal inspite of current medications you are using. Will increase the dose of your Glipizide 10 mg twice daily in addition to current Metformin for improvement. Also placed referral to diabetic educator to follow with you closely for normalization in your next follow up.     Your Vitamin D levels are abnormally low, Sent high dose Vitamin D 50,000 units once a week to your pharmacy for 3 months. After you complete the 3 months course resume back to Over the Counter Vitamin D 2000 units daily.    Your white cell counts are mildly low for unspecified reasons. Will need follow up in yearly labs for making sure no worsening.     All your OTHER labs normal, you may see some highlighted which do not have Clinical significance.    Please let me know if you have any questions.    Melissa Lam MD on 10/23/2024

## 2024-10-23 NOTE — TELEPHONE ENCOUNTER
Called patient with an . Provider's message was relayed to patient. He stated understanding and had no further questions.    Gaby ALANIS RN  [facility] Triage Team

## 2024-12-17 NOTE — PATIENT INSTRUCTIONS
As discussed, please make a LAB only appointment for the diabetes check ordered for further adjustment on medications if needed. No need of fasting.    36.8

## 2024-12-23 ENCOUNTER — OFFICE VISIT (OUTPATIENT)
Dept: OPHTHALMOLOGY | Facility: CLINIC | Age: 39
End: 2024-12-23
Attending: OPHTHALMOLOGY
Payer: COMMERCIAL

## 2024-12-23 DIAGNOSIS — D09.21 SQUAMOUS CELL CARCINOMA IN SITU (SCCIS) OF RIGHT CONJUNCTIVA: Primary | ICD-10-CM

## 2024-12-23 DIAGNOSIS — H11.153 PINGUECULA OF BOTH EYES: ICD-10-CM

## 2024-12-23 PROCEDURE — G0463 HOSPITAL OUTPT CLINIC VISIT: HCPCS | Performed by: OPHTHALMOLOGY

## 2024-12-23 ASSESSMENT — CONF VISUAL FIELD
OD_SUPERIOR_NASAL_RESTRICTION: 0
OS_SUPERIOR_TEMPORAL_RESTRICTION: 0
OS_SUPERIOR_NASAL_RESTRICTION: 0
OD_INFERIOR_NASAL_RESTRICTION: 0
OD_NORMAL: 1
OS_INFERIOR_TEMPORAL_RESTRICTION: 0
OD_INFERIOR_TEMPORAL_RESTRICTION: 0
OS_INFERIOR_NASAL_RESTRICTION: 0
METHOD: COUNTING FINGERS
OS_NORMAL: 1
OD_SUPERIOR_TEMPORAL_RESTRICTION: 0

## 2024-12-23 ASSESSMENT — TONOMETRY
OD_IOP_MMHG: 19
IOP_METHOD: TONOPEN
OS_IOP_MMHG: 18

## 2024-12-23 ASSESSMENT — VISUAL ACUITY
OS_SC: 20/20
METHOD: SNELLEN - LINEAR
OD_SC: 20/20

## 2024-12-23 ASSESSMENT — SLIT LAMP EXAM - LIDS
COMMENTS: NORMAL
COMMENTS: NORMAL

## 2024-12-23 NOTE — PROGRESS NOTES
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  Associated symptoms include Negative for eye pain, flashes, floaters and discharge.  Treatments tried include no treatments.             Comments    Pt reports stable vision without other complaints.     Last BS was 220 with food.    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 The horizontal margins in the plane of section are uninvolved. No invasive lesion is identified.    12/13/23: POM1 right conjunctival excision.no recurrence. 3 remaining vycril sutures  6/12/24: no recurrence of the lesion clear cornea, left eye stable look  12/23/24: no recurrence of the lesion clear cornea, left eye stable look      Plan:  No recurrence  Yearly exam locally     Other conditions followed with Dr Awad  #Type 2 diabetes mellitus without complication, without long-term current use of insulin (H)  Diagnosed 2021  Most recent HgBA1c 8.1 on 4/10/23  No background diabetic retinopathy or neovascularization noted on today's exam.  Discussed ocular and systemic benefits of blood pressure and blood sugar control.  Return in 1 year for full exam with  dilation or sooner if changes to vision.      #NS cataract, both eyes  Mild cataracts are present . No treatment currently recommended. The patient will monitor for vision changes and contact us with any decrease in vision. Recheck in one year.     RTC yearly VTD with Dr Delmi Briscoe MD  Cornea and External Disease Fellow  [facility]  Attending Physician Attestation:  Complete documentation of historical and exam elements from today's encounter can be found in the full encounter summary report (not reduplicated in this progress note).  I personally obtained the chief complaint(s) and history of present illness.  I confirmed and edited as necessary the review of systems, past medical/surgical history, family history, social history, and examination findings as documented by others; and I examined the patient myself.  I personally reviewed the relevant tests, images, and reports as documented above.  I formulated and edited as necessary the assessment and plan and discussed the findings and management plan with the patient and family. - Dylan Bell MD

## 2024-12-23 NOTE — NURSING NOTE
Chief Complaints and History of Present Illnesses   Patient presents with    Follow Up     Chief Complaint(s) and History of Present Illness(es)       Follow Up              Course: stable    Associated symptoms: Negative for eye pain, flashes, floaters and discharge    Treatments tried: no treatments              Comments    Pt reports stable vision without other complaints.     Last BS was 220 with food.    Lab Results       Component                Value               Date                       A1C                      8.4                 10/22/2024                 A1C                      8.8                 06/18/2024                 A1C                      7.7                 10/04/2023          Brooke Ramirez OA 10:32 AM December 23, 2024

## 2025-03-19 DIAGNOSIS — E11.9 TYPE 2 DIABETES MELLITUS WITHOUT COMPLICATION, WITHOUT LONG-TERM CURRENT USE OF INSULIN (H): ICD-10-CM

## 2025-03-20 RX ORDER — LISINOPRIL 5 MG/1
TABLET ORAL
Qty: 90 TABLET | Refills: 0 | Status: SHIPPED | OUTPATIENT
Start: 2025-03-20

## 2025-03-21 NOTE — TELEPHONE ENCOUNTER
Refill done. Future refills after follow up .     Please call the patient and schedule VV of diabetes follow up with me, which patient is due on 4/22/2025 , to further take care of the medical conditions and medications. OK to use same day slot / 8 AM on Tuesdays/Fridays.    Thank you,  Melissa Lam MD on 3/20/2025

## 2025-04-22 ENCOUNTER — VIRTUAL VISIT (OUTPATIENT)
Dept: FAMILY MEDICINE | Facility: CLINIC | Age: 40
End: 2025-04-22
Payer: COMMERCIAL

## 2025-04-22 DIAGNOSIS — E11.9 TYPE 2 DIABETES MELLITUS WITHOUT RETINOPATHY (H): ICD-10-CM

## 2025-04-22 DIAGNOSIS — E78.5 DYSLIPIDEMIA: ICD-10-CM

## 2025-04-22 DIAGNOSIS — G83.4 CAUDA EQUINA SYNDROME (H): ICD-10-CM

## 2025-04-22 DIAGNOSIS — C69.01 MALIGNANT NEOPLASM OF RIGHT CONJUNCTIVA (H): ICD-10-CM

## 2025-04-22 DIAGNOSIS — E11.9 TYPE 2 DIABETES MELLITUS WITHOUT COMPLICATION, WITHOUT LONG-TERM CURRENT USE OF INSULIN (H): Primary | ICD-10-CM

## 2025-04-22 DIAGNOSIS — E55.9 VITAMIN D DEFICIENCY: ICD-10-CM

## 2025-04-22 PROCEDURE — 98006 SYNCH AUDIO-VIDEO EST MOD 30: CPT | Performed by: INTERNAL MEDICINE

## 2025-04-22 NOTE — PATIENT INSTRUCTIONS
As discussed, please do the lab only appointment in fasting for getting the lab work done which I placed given your uncontrolled diabetes and uncontrolled cholesterol with the recent adjustments in medications.  Further recommendations after I see the lab results.

## 2025-04-22 NOTE — PROGRESS NOTES
Blanca is a 40 year old who is being evaluated via a billable video visit.    How would you like to obtain your AVS? MyChart  If the video visit is dropped, the invitation should be resent by: Text to cell phone: [phone]  Will anyone else be joining your video visit? No          Assessment and Plan  1. Type 2 diabetes mellitus without complication, without long-term current use of insulin (H) (Primary)  2. Type 2 diabetes mellitus without retinopathy (H)  Chronic problem, uncontrolled at 8.4% compared to the past 8.8%.  Patient was adjusted on his dose of glipizide to 10 mg twice daily and continued metformin 1000 mg twice daily.  Patient states that he has been very much compliant with all his medications without missing the dosages, does states that if BG remaining at 140 sometimes.  Will recheck labs at this time and further titration.  Patient is willing to try newer diabetic medications though they are not very cost effective which he understands.  - Lipid panel reflex to direct LDL Fasting; Future  - Comprehensive metabolic panel (BMP + Alb, Alk Phos, ALT, AST, Total. Bili, TP); Future  - HEMOGLOBIN A1C; Future    3. Dyslipidemia  Uncontrolled with triglycerides remaining at 400s in the last check, patient was started on Lipitor high-dose at 80 mg daily, will recheck patient lab work and do further recommendations and titration of dosage if needed.  Patient understands the plan.  - Comprehensive metabolic panel (BMP + Alb, Alk Phos, ALT, AST, Total. Bili, TP); Future  - Lipid panel reflex to direct LDL Fasting; Future    4. Vitamin D deficiency  Recent completion of high-dose vitamin D, patient has been taking over-the-counter.  Given the uncontrolled diabetes will recheck his vitamin D and further recommend on next steps.   - Vitamin D deficiency screening; Future    5. Malignant neoplasm of right conjunctiva (H)  6. Cauda equina syndrome (H)  Chronic stable conditions, patient denies any acute concerns  at this time.  Following up with respective specialist.       The longitudinal plan of care for the diagnosis(es)/condition(s) as documented were addressed during this visit. Due to the added complexity in care, I will continue to support Blanca in the subsequent management and with ongoing continuity of care.      Please note that this note consists of symbols derived from keyboarding, dictation and/or voice recognition software. As a result, there may be errors in the script that have gone undetected. Please consider this when interpreting information found in this chart.    Patient Instructions   As discussed, please do the lab only appointment in fasting for getting the lab work done which I placed given your uncontrolled diabetes and uncontrolled cholesterol with the recent adjustments in medications.  Further recommendations after I see the lab results.      Return in about 6 months (around 10/22/2025), or if symptoms worsen or fail to improve, for diabetes.    Melissa Lam MD  [facility] HealthVAL[facility] Rios is a 40 year old, presenting for the following health issues:  Diabetes        4/22/2025     3:53 PM   Additional Questions   Roomed by Maureen FAJARDO      Diabetes Follow-up    How often are you checking your blood sugar? A few times a week  What time of day are you checking your blood sugars (select all that apply)?  Before and after meals  Have you had any blood sugars above 200?  Yes   Have you had any blood sugars below 70?  No  What symptoms do you notice when your blood sugar is low?  None  What concerns do you have today about your diabetes? None   Do you have any of these symptoms? (Select all that apply)  No numbness or tingling in feet.  No redness, sores or blisters on feet.  No complaints of excessive thirst.  No reports of blurry vision.  No significant changes to weight.      BP Readings from Last 2 Encounters:   10/22/24 124/82   11/13/23 108/67      Hemoglobin A1C (%)   Date Value   10/22/2024 8.4 (H)   06/18/2024 8.8 (H)   03/09/2021 10.2 (H)     LDL Cholesterol Calculated   Date Value   10/22/2024      Comment:     Cannot estimate LDL when triglyceride exceeds 400 mg/dL   10/04/2023 72 mg/dL   03/01/2021 142 mg/dL (H)     LDL Cholesterol Direct (mg/dL)   Date Value   10/22/2024 135 (H)         How many servings of fruits and vegetables do you eat daily?  2-3  On average, how many sweetened beverages do you drink each day (Examples: soda, juice, sweet tea, etc.  Do NOT count diet or artificially sweetened beverages)?   1  How many days per week do you exercise enough to make your heart beat faster? 3 or less  How many minutes a day do you exercise enough to make your heart beat faster? 9 or less  How many days per week do you miss taking your medication? 0      Last seen patient in October 2020 for for annual physical at that time, she is here for the follow-up of diabetes.  Last A1c checked in October 2024 showing uncontrolled at 8.4% similar to that seen in the past at 8.8%.  Patient glipizide was adjusted to 10 mg twice daily and metformin remaining the same at 1000 mg twice daily.  He is here for recheck.       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 100 each 3    atorvastatin (LIPITOR) 80 MG tablet Take 1 tablet (80 mg) by mouth daily. 90 tablet 1    blood glucose (NO BRAND SPECIFIED) test strip Use to test blood sugar 1 times daily or as directed. To accompany: Blood Glucose Monitor Brands: per insurance. 100 strip 0    blood glucose calibration (NO BRAND SPECIFIED) solution To accompany: Blood Glucose Monitor Brands: per insurance. 1 Bottle 3    blood glucose monitoring (NO BRAND SPECIFIED) meter device kit Use to test blood sugar 1 times daily or as directed. Preferred blood glucose meter OR supplies to accompany: Blood Glucose Monitor Brands: per insurance. 1 kit 0    cyclobenzaprine (FLEXERIL) 10 MG tablet Take 1 tablet (10 mg) by mouth 3 times daily as needed for muscle spasms 30 tablet 2    gabapentin (NEURONTIN) 100 MG capsule Take 1 capsule (100 mg) by mouth 3 times daily 90 capsule 1    glipiZIDE (GLUCOTROL XL) 10 MG 24 hr tablet Take 1 tablet (10 mg) by mouth 2 times daily. 180 tablet 1    lisinopril (ZESTRIL) 5 MG tablet TAKE 1 TABLET(5 MG) BY MOUTH DAILY 90 tablet 0    meloxicam (MOBIC) 15 MG tablet Take 1 tablet daily as needed for back pain 30 tablet 1    metFORMIN (GLUCOPHAGE XR) 500 MG 24 hr tablet Take 2 tablets (1,000 mg) by mouth 2 times daily (with meals). 120 tablet 5    thin (NO BRAND SPECIFIED) lancets Use with lanceting device. To accompany: Blood Glucose Monitor Brands: per insurance. 100 each 6    ofloxacin (OCUFLOX) 0.3 % ophthalmic solution Place 1 drop into the right eye 4 times daily (Patient not taking: Reported on 4/22/2025) 5 mL 1    omeprazole (PRILOSEC) 20 MG DR capsule Take 1 capsule (20 mg) by mouth daily. (Patient not taking: Reported on 4/22/2025) 30 capsule 2    prednisoLONE acetate (PRED FORTE) 1 % ophthalmic suspension Place 1 drop into the right eye 4 times daily (Patient not taking: Reported on 4/22/2025) 5 mL 1    vitamin D2 (ERGOCALCIFEROL) 80707 units (1250 mcg) capsule Take 1 capsule  (50,000 Units) by mouth once a week. (Patient not taking: Reported on 4/22/2025) 12 capsule 0     No current facility-administered medications for this visit.          Review of Systems  Constitutional, HEENT, cardiovascular, pulmonary, GI, , musculoskeletal, neuro, skin, endocrine and psych systems are negative, except as otherwise noted.      Objective           Vitals:  No vitals were obtained today due to virtual visit.    Physical Exam   GENERAL: alert and no distress  EYES: Eyes grossly normal to inspection.  No discharge or erythema, or obvious scleral/conjunctival abnormalities.  RESP: No audible wheeze, cough, or visible cyanosis.    SKIN: Visible skin clear. No significant rash, abnormal pigmentation or lesions.  NEURO: Cranial nerves grossly intact.  Mentation and speech appropriate for age.  PSYCH: Appropriate affect, tone, and pace of words      Video-Visit Details    Type of service:  Video Visit   Originating Location (pt. Location): Home    Distant Location (provider location):  On-site  Platform used for Video Visit: Kimberly  Signed Electronically by: Melissa Lam MD

## 2025-04-28 ENCOUNTER — LAB (OUTPATIENT)
Dept: LAB | Facility: CLINIC | Age: 40
End: 2025-04-28
Payer: COMMERCIAL

## 2025-04-28 DIAGNOSIS — E55.9 VITAMIN D DEFICIENCY: ICD-10-CM

## 2025-04-28 DIAGNOSIS — E78.5 DYSLIPIDEMIA: ICD-10-CM

## 2025-04-28 DIAGNOSIS — E11.9 TYPE 2 DIABETES MELLITUS WITHOUT COMPLICATION, WITHOUT LONG-TERM CURRENT USE OF INSULIN (H): ICD-10-CM

## 2025-04-28 DIAGNOSIS — E11.9 TYPE 2 DIABETES MELLITUS WITHOUT RETINOPATHY (H): ICD-10-CM

## 2025-04-28 LAB
ALBUMIN SERPL BCG-MCNC: 4.8 G/DL (ref 3.5–5.2)
ALP SERPL-CCNC: 158 U/L (ref 40–150)
ALT SERPL W P-5'-P-CCNC: 30 U/L (ref 0–70)
ANION GAP SERPL CALCULATED.3IONS-SCNC: 11 MMOL/L (ref 7–15)
AST SERPL W P-5'-P-CCNC: 28 U/L (ref 0–45)
BILIRUB SERPL-MCNC: 0.5 MG/DL
BUN SERPL-MCNC: 6.4 MG/DL (ref 6–20)
CALCIUM SERPL-MCNC: 10 MG/DL (ref 8.8–10.4)
CHLORIDE SERPL-SCNC: 100 MMOL/L (ref 98–107)
CHOLEST SERPL-MCNC: 142 MG/DL
CREAT SERPL-MCNC: 0.86 MG/DL (ref 0.67–1.17)
EGFRCR SERPLBLD CKD-EPI 2021: >90 ML/MIN/1.73M2
EST. AVERAGE GLUCOSE BLD GHB EST-MCNC: 252 MG/DL
FASTING STATUS PATIENT QL REPORTED: YES
FASTING STATUS PATIENT QL REPORTED: YES
GLUCOSE SERPL-MCNC: 183 MG/DL (ref 70–99)
HBA1C MFR BLD: 10.4 % (ref 0–5.6)
HCO3 SERPL-SCNC: 29 MMOL/L (ref 22–29)
HDLC SERPL-MCNC: 30 MG/DL
LDLC SERPL CALC-MCNC: 63 MG/DL
NONHDLC SERPL-MCNC: 112 MG/DL
POTASSIUM SERPL-SCNC: 4.7 MMOL/L (ref 3.4–5.3)
PROT SERPL-MCNC: 7.5 G/DL (ref 6.4–8.3)
SODIUM SERPL-SCNC: 140 MMOL/L (ref 135–145)
TRIGL SERPL-MCNC: 243 MG/DL
VIT D+METAB SERPL-MCNC: 28 NG/ML (ref 20–50)

## 2025-04-28 PROCEDURE — 82306 VITAMIN D 25 HYDROXY: CPT

## 2025-04-28 PROCEDURE — 36415 COLL VENOUS BLD VENIPUNCTURE: CPT

## 2025-04-28 PROCEDURE — 80053 COMPREHEN METABOLIC PANEL: CPT

## 2025-04-28 PROCEDURE — 80061 LIPID PANEL: CPT

## 2025-04-28 PROCEDURE — 83036 HEMOGLOBIN GLYCOSYLATED A1C: CPT

## 2025-04-29 ENCOUNTER — TELEPHONE (OUTPATIENT)
Dept: FAMILY MEDICINE | Facility: CLINIC | Age: 40
End: 2025-04-29
Payer: COMMERCIAL

## 2025-04-29 NOTE — TELEPHONE ENCOUNTER
----- Message from Melissa Lam sent at 4/28/2025  9:18 PM CDT -----  Your diabetes remaining uncontrolled and worsening compared to the past inspite of current medications - Metformin 1000 mg twice daily ( I am sure you are taking them as prescribed regularly )    Went ahead and changed it to Janumet - as discussed and added a low dose glipizide for normalization. Please follow up with diabetic educator on the referral placed again which was placed in the past too - for close follow up and further titration of medications >. Before we recheck in your diabetes follow up with me in your 3 months recheck before next steps.     Thank you  Melissa Lam MD on 4/28/2025

## 2025-04-29 NOTE — TELEPHONE ENCOUNTER
Spoke to patient and relayed provider message. Patient verbalized understanding of the message. No questions at this time. Gave A1C value and discussed that metformin was discontinued and replaced by janumet.         Used  services.       April Chaves RN

## 2025-04-29 NOTE — TELEPHONE ENCOUNTER
Melissa Lam MD  4/28/2025 10:21 PM CDT       Your Cholesterol triglycerides are abnormal due to uncontrolled diabetes. Please follow the instructions already given for improvement.     Your one of the liver enzyme is abnormally high most likely fatty liver. Please work on normalization of diabetes. Will need follow up    Melissa Lam MD  4/28/2025  9:18 PM CDT       Your diabetes remaining uncontrolled and worsening compared to the past inspite of current medications - Metformin 1000 mg twice daily ( I am sure you are taking them as prescribed regularly )     Went ahead and changed it to Janumet - as discussed and added a low dose glipizide for normalization. Please follow up with diabetic educator on the referral placed again which was placed in the past too - for close follow up and further titration of medications >. Before we recheck in your diabetes follow up with me in your 3 months recheck before next steps.     Thank you  Melissa Lam MD on 4/28/2025

## 2025-05-07 ENCOUNTER — VIRTUAL VISIT (OUTPATIENT)
Dept: EDUCATION SERVICES | Facility: CLINIC | Age: 40
End: 2025-05-07
Attending: INTERNAL MEDICINE
Payer: COMMERCIAL

## 2025-05-07 DIAGNOSIS — E11.9 TYPE 2 DIABETES MELLITUS WITHOUT COMPLICATION, WITHOUT LONG-TERM CURRENT USE OF INSULIN (H): ICD-10-CM

## 2025-05-07 PROCEDURE — 98967 PH1 ASSMT&MGMT NQHP 11-20: CPT | Mod: 95 | Performed by: NUTRITIONIST

## 2025-05-07 NOTE — LETTER
5/7/2025         RE: Blanca Zee  [address]  [place] [address]        Dear Colleague,    Thank you for referring your patient, Blanca Zee, to the [facility]. Please see a copy of my visit note below.    Diabetes Self-Management Education & Support    Presents for: Individual review    Type of service:  Video Visit     If the video visit is dropped, the video visit invitation should be resent by: Text to cell phone: [phone]    Originating Location (pt. Location): Other Parking Lot  Distant Location (provider location): [facility]  Mode of Communication:  Video Conference via [employer]    Video Start Time: 1:14 PM  Video End Time (time video stopped): 1:31 PM    How would patient like to obtain AVS? Mail a copy      Assessment  Blanca reports that he is monitoring blood glucose levels 1-2 times/day and for the past week, fasting blood sugar readings in the 150's and 2 hours after meals in the 130's.  He has not picked-up Janumet yet from pharmacy but states he will do so today and start taking.  Review of diet shows 2 meals with possible high carbohydrate intake with oatmeal/breads/noodles/rice, he is agreeable to watch portions and try not to exceed more than one fist full at each meal.  He plans to return to gym next weekend and states no issue with achieving exercise recommendation.      Patient's most recent   Lab Results   Component Value Date    A1C 10.4 04/28/2025    A1C 10.2 03/09/2021     is not meeting goal of <7.0    Diabetes knowledge and skills assessment:   Patient is knowledgeable in diabetes management concepts related to: Healthy Eating, Being Active, Monitoring, Taking Medication, Problem Solving, and Reducing Risks    Based on learning assessment above, most appropriate setting for further diabetes education would be: Individual setting.    Care Plan and Education Provided:  Healthy Eating:  "Balanced meals, Plate planning method, and Portion control  Being Active: Amount recommended (150 minutes moderate or 75 minutes vigorous activity and 2-3 days strength training per week)  Taking Medication: When to take medication(s)    Patient verbalized understanding of diabetes self-management education concepts discussed, opportunities for ongoing education and support, and recommendations provided today.    Plan    1) Pick-up Janumet  mg from pharmacy and start taking twice daily with meals  2) Start back to gym and aim for >30 minutes of cardio 5 days/week and 2-3 days/week some resistance/strength exercises  3) Make follow-up appointment if blood glucose levels fasting are not less than 130 mg/dL after starting new medication    Topics to cover at upcoming visits: Healthy Coping    See Care Plan for co-developed, patient-state behavior change goals.    Education Materials Provided:  No new materials provided today      Subjective/Objective  Blanca is an 40 year old, presenting for the following diabetes education related to: Individual review  Accompanied by: Self  Focus of Visit: Patient Unsure  Diabetes type: Type 2  Disease course: Improving  How confident are you filling out medical forms by yourself:: Not Assessed  Transportation concerns: No  Difficulty affording diabetes medication?: No  Difficulty affording diabetes testing supplies?: No  Other concerns: English as a second language  Cultural Influences/Ethnic Background:  Not  or     Diabetes Symptoms & Complications:  Diabetes Related Symptoms: None  Disease course: Improving  Complications assessed today?: No    Patient Problem List and Family Medical History reviewed for relevant medical history, current medical status, and diabetes risk factors.    Vitals:  There were no vitals taken for this visit.  Estimated body mass index is 27.36 kg/m  as calculated from the following:    Height as of 10/22/24: 1.83 m (6' 0.05\").    " Weight as of 10/22/24: 91.6 kg (202 lb).   Last 3 BP:   BP Readings from Last 3 Encounters:   10/22/24 124/82   11/13/23 108/67   11/09/23 125/81       History   Smoking Status     Never   Smokeless Tobacco     Never       Labs:  Lab Results   Component Value Date    A1C 10.4 04/28/2025    A1C 10.2 03/09/2021     Lab Results   Component Value Date     04/28/2025     11/13/2023     08/30/2022     03/09/2021     Lab Results   Component Value Date    LDL 63 04/28/2025     03/01/2021     HDL Cholesterol   Date Value Ref Range Status   03/01/2021 31 (L) >39 mg/dL Final     Direct Measure HDL   Date Value Ref Range Status   04/28/2025 30 (L) >=40 mg/dL Final     GFR Estimate   Date Value Ref Range Status   04/28/2025 >90 >60 mL/min/1.73m2 Final     Comment:     eGFR calculated using 2021 CKD-EPI equation.   03/09/2021 >90 >60 mL/min/[1.73_m2] Final     Comment:     Non  GFR Calc  Starting 12/18/2018, serum creatinine based estimated GFR (eGFR) will be   calculated using the Chronic Kidney Disease Epidemiology Collaboration   (CKD-EPI) equation.       GFR Estimate If Black   Date Value Ref Range Status   03/09/2021 >90 >60 mL/min/[1.73_m2] Final     Comment:      GFR Calc  Starting 12/18/2018, serum creatinine based estimated GFR (eGFR) will be   calculated using the Chronic Kidney Disease Epidemiology Collaboration   (CKD-EPI) equation.       Lab Results   Component Value Date    CR 0.86 04/28/2025    CR 0.90 03/09/2021     Lab Results   Component Value Date    MICROL <12.0 10/22/2024    UMALCR  10/22/2024      Comment:      Unable to calculate, urine albumin and/or urine creatinine is outside detectable limits.  Microalbuminuria is defined as an albumin:creatinine ratio of 17 to 299 for males and 25 to 299 for females. A ratio of albumin:creatinine of 300 or higher is indicative of overt proteinuria.  Due to biologic variability, positive results should  be confirmed by a second, first-morning random or 24-hour timed urine specimen. If there is discrepancy, a third specimen is recommended. When 2 out of 3 results are in the microalbuminuria range, this is evidence for incipient nephropathy and warrants increased efforts at glucose control, blood pressure control, and institution of therapy with an angiotensin-converting-enzyme (ACE) inhibitor (if the patient can tolerate it).      UCRR 80.4 10/22/2024           5/7/2025   Healthy Eating   Healthy Eating Assessed Today Yes   Cultural/Christianity diet restrictions? Yes   Do you have any food allergies or intolerances? No   Meal planning/habits None   Who cooks/prepares meals for you? Self   Who purchases food in  your home? Self   How many times a week on average do you eat food made away from home (restaurant/take-out)? 1       cucumber   Meals include Breakfast;Lunch   Breakfast oatmeal or 1-2 slices injrera   Lunch 2 slices injera with soup (meat/veg) OR fish/chicken with rice and vegetables   Dinner skips   Snacks miguelina or avocado   Beverages Water   Has patient met with a dietitian in the past? Yes         5/7/2025   Being Active   Being Active Assessed Today Yes   Exercise: Currently not exercising       plans to go back to gym this weekend         5/7/2025   Monitoring   Monitoring Assessed Today Yes   Did patient bring glucose meter to appointment?  Yes   Blood Glucose Meter Reli-On   Times checking blood sugar at home (number) 2   Times checking blood sugar at home (per) Day   Blood glucose trend Decreasing     Diabetes Medication(s)       Sulfonylureas       glipiZIDE (GLUCOTROL XL) 10 MG 24 hr tablet Take 1 tablet (10 mg) by mouth 2 times daily.     glipiZIDE (GLUCOTROL XL) 2.5 MG 24 hr tablet Take 1 tablet (2.5 mg) by mouth daily.       Antidiabetic Combinations       sitagliptin-metFORMIN (JANUMET)  MG tablet Take 1 tablet by mouth 2 times daily (with meals).              5/7/2025   Taking  Medications   Taking Medication Assessed Today Yes   Current Treatments Oral Medication (taken by mouth)   Problems taking diabetes medications regularly? --        hasn't started Trevonumet will  today         5/7/2025   Problem Solving   Problem Solving Assessed Today Yes   Is the patient at risk for hypoglycemia? Yes   Hypoglycemia Frequency Never   Hypoglycemia None   Hypoglycemia Complications None           5/7/2025   Reducing Risks   Reducing Risks Assessed Today Yes   Diabetes Risks Ethnicity;Hyperlipidemia;Age over 45 years   CAD Risks Dyslipidemia;Diabetes Mellitus;Male sex   Has dilated eye exam at least once a year? Yes   Sees dentist every 6 months? Yes   Feet checked by healthcare provider in the last year? Yes       Anastacia Da Silva RDN, TAB, [facility]   Time Spent: 17 minutes  Encounter Type: Individual    Any diabetes medication dose changes were made via the [facility] Standing Orders under the patient's referring provider.

## 2025-05-07 NOTE — PATIENT INSTRUCTIONS
Son Rios,    Here are some things that we discussed today.      Goals for Diabetes Care:    1. Eat 3 balanced meals each day -  Aim to eat a balanced plate - half your plate fruits/veggies, 1/4 the plate protein and 1/4 plate starch (about one fist full of rice, potato, pasta)    2. Check blood sugars 1-2 times each day at varying times   Blood Glucose Targets:   1. Fasting and before meal target is 80 - 130 mg/dl   2. 2 hours after a meal target is < 180 mg/dl  Always remember to bring meter and/or log book to all appointments.    3. Activity really helps improve blood sugars. Try to Incorporate 30 minutes activity into each day - does not need to be all at one time & walking counts!    4. Treating low BG. Rule of 15 = BG 50-70 mg/dL = 15 gram carb (4 oz juice, 4 glucose tabs, OR 4 oz pop), then recheck BG in 15 minutes. If still low repeat. (If BG <50 mg/dL = 8 oz pop/juice or 8 glucose tabs).    5. Take diabetes medications as prescribed   -Pick-up Janumet  mg from pharmacy and start taking twice daily with meals  -Continue with Glipizide XL 2.5 mg once daily.       Follow up with your Diabetic Educator to assess BG targets and need for modifications to medications and/or lifestyle.    Call with any questions.  Thank you!  Anastacia Da Silva, RD, LD, [facility]   Certified Diabetes Care &   [facility]  Triage [phone] or Scheduling [phone]

## 2025-06-02 ENCOUNTER — NURSE TRIAGE (OUTPATIENT)
Dept: FAMILY MEDICINE | Facility: CLINIC | Age: 40
End: 2025-06-02

## 2025-06-02 ENCOUNTER — VIRTUAL VISIT (OUTPATIENT)
Dept: FAMILY MEDICINE | Facility: CLINIC | Age: 40
End: 2025-06-02
Payer: COMMERCIAL

## 2025-06-02 DIAGNOSIS — M54.16 LUMBAR RADICULOPATHY: ICD-10-CM

## 2025-06-02 DIAGNOSIS — M48.062 SPINAL STENOSIS OF LUMBAR REGION WITH NEUROGENIC CLAUDICATION: Primary | ICD-10-CM

## 2025-06-02 PROCEDURE — 98006 SYNCH AUDIO-VIDEO EST MOD 30: CPT | Performed by: PHYSICIAN ASSISTANT

## 2025-06-02 RX ORDER — HYDROCODONE BITARTRATE AND ACETAMINOPHEN 5; 325 MG/1; MG/1
1-2 TABLET ORAL EVERY 4 HOURS PRN
Qty: 15 TABLET | Refills: 0 | Status: SHIPPED | OUTPATIENT
Start: 2025-06-02

## 2025-06-02 ASSESSMENT — ENCOUNTER SYMPTOMS: LEG PAIN: 1

## 2025-06-02 NOTE — PATIENT INSTRUCTIONS
-Narcotic medications can be addicting and therefore are used for short term pain control only.  They are not intended for long-term use.    -Take them only for severe pain as needed.    -Never mix with alcohol.    -Do not drive after taking this medication.    -No refills without an office visit. No replacements will be given.    -Keep these medications kept in a safe location.  You are responsible for them.  -Do not give them to anyone else.  They are prescribed to you only.   If you have [email or web address] access here are some tips for appropriate use of [employer]:      If you are having a medical emergency, call 911.       [email or web address] messages are intended for quick communication and do not replace the need for visits that allow for two-way conversations. Visits are used for clarification, documentation, review, medical decision making, education and answering questions resulting in the best and safest care for you.    A new concern not brought up at a recent visit will require a separate in person or virtual visit and will not be addressed through a [email or web address] message.    Acceptable uses of [employer] messages would include simple questions or clarification questions about your last visit or After Visit Summary (AVS).       If you need me to complete any forms or letters, adjust, modify or prescribe new prescriptions, assess and respond to new, worsening, or unimproved symptoms, or need a follow up action not discussed in your After Visit Summary (AVS), this will require you to schedule a visit with a provider (in person, video, E-visit, urgent care, emergency room) to receive the best and safe care.    If you are unsure of which type of visit is appropriate for your particular concern, this would be appropriate to send as a [email or web address] question.       Medical messages you send become part of your official medical record and should contain only medically related content.       Lab results and X-Ray results will appear in your results as  soon as they are available on your [employer]-- even before I see them. Myself (or a covering provider if I am out of office), will review and provide specific comments for your results on your [employer] within 3-7 business days.  It is not necessary to separately message a provider before this time. We will try and contact you by phone for more urgent results.    -If you do not have an [employer] account, test/radiology results will arrive by mail within 1-2 weeks.       E-visits or messages are not reviewed over the weekend.      If you have a billing concern or questions, please call billing at [phone].

## 2025-06-02 NOTE — TELEPHONE ENCOUNTER
"Looks like phone visit for \"leg pain\" was made via conversation with a  at 1:40 today.    Patient's PCP is out of [place].    I called and spoke to patient, he says he used to have leg pain on the right, now is on the left.   He says he was diagnosed with sciatica on the right side in the past and he thinks this is the problem again but on the left.    See triage below:    SEE IN OFFICE OR VIDEO VISIT TODAY OR TOMORROW:  * You need to be examined or have a video telemedicine visit.  * PCP OFFICE VISIT: Let me give you an appointment.  * TRIAGER OPTION - MAKE VIDEO VISIT APPOINTMENT: I am going to set up a video telemedicine visit for you.    Patient is already scheduled for a phone visit, changed that to video visit.    Sadia HANKS RN  [facility] Triage      Reason for Disposition   Patient wants to be seen    Additional Information   Negative: Looks like a broken bone or dislocated joint (e.g., crooked or deformed)   Negative: Sounds like a life-threatening emergency to the triager   Negative: Followed a hip injury   Negative: Followed a knee injury   Negative: Followed an ankle injury   Negative: Back pain radiating (shooting) into leg(s)   Negative: Foot pain is main symptom   Negative: Ankle pain is main symptom   Negative: Knee pain is main symptom   Negative: Leg swelling is main symptom   Negative: Chest pain   Negative: Difficulty breathing   Negative: Entire foot is cool or blue in comparison to other side   Negative: Unable to walk   Negative: Fever and red area (or area very tender to touch)   Negative: Swollen joint and fever   Negative: Thigh or calf pain in only one leg and present > 1 hour   Negative: Thigh, calf, or ankle swelling in only one leg   Negative: Thigh, calf, or ankle swelling in both legs, but one side is definitely more swollen   Negative: History of prior 'blood clot' in leg or lungs (i.e., deep vein thrombosis, pulmonary embolism)   Negative: History of " "inherited increased risk of blood clots (e.g., factor 5 Leiden, antithrombin 3, protein C or protein S deficiency, prothrombin mutation)   Negative: Major surgery in past month   Negative: Hip or leg fracture (broken bone) in past month (or had cast on leg or ankle in past month)   Negative: Illness requiring prolonged bedrest in past month (e.g., immobilization, long hospital stay)   Negative: Long-distance travel in past month (e.g., car, bus, train, plane; with trip lasting 6 or more hours)   Negative: Cancer treatment in past six months (or has cancer now)   Negative: Patient sounds very sick or weak to the triager   Negative: SEVERE pain (e.g., excruciating, unable to do any normal activities)   Negative: Cast on leg or ankle and now has increasing pain   Negative: Red area or streak > 2 inches (or 5 cm)   Negative: Painful rash with multiple small blisters grouped together (i.e., dermatomal distribution or 'band' or 'stripe')   Negative: Looks like a boil, infected sore, deep ulcer, or other infected rash (spreading redness, pus)   Negative: Localized rash is very painful (no fever)    Answer Assessment - Initial Assessment Questions  1. ONSET: \"When did the pain start?\"       7 days ago  2. LOCATION: \"Where is the pain located?\"        Entire leg  3. PAIN: \"How bad is the pain?\"    (Scale 1-10; or mild, moderate, severe)      6  4. WORK OR EXERCISE: \"Has there been any recent work or exercise that involved this part of the body?\"       Unsure, no fall or injury  5. CAUSE: \"What do you think is causing the leg pain?\"      unsure  6. OTHER SYMPTOMS: \"Do you have any other symptoms?\" (e.g., chest pain, back pain, breathing difficulty, swelling, rash, fever, numbness, weakness)      no  7. PREGNANCY: \"Is there any chance you are pregnant?\" \"When was your last menstrual period?\"      N/a    Protocols used: Leg Pain-A-[place]    "

## 2025-06-02 NOTE — PROGRESS NOTES
Blanca is a 40 year old who is being evaluated via a billable video visit.    How would you like to obtain your AVS? Mail a copy  If the video visit is dropped, the invitation should be resent by: Text to cell phone: [phone]  Will anyone else be joining your video visit? No      Assessment & Plan     Spinal stenosis of lumbar region with neurogenic claudication  No red flag symptoms  To ER with worsening symptoms or loss of b/b continence or numbness in groin.   Schedule, MRI, injection, and with spine due to long history of this  See patient instructions below for more plan.  Continue ibuprofen 600 mg every 8 hours  - Spine  Referral; Future    Lumbar radiculopathy    - HYDROcodone-acetaminophen (NORCO) 5-325 MG tablet; Take 1-2 tablets by mouth every 4 hours as needed for pain.  - Pain Management  Referral; Future  - MR Lumbar Spine w/o Contrast; Future  - Spine  Referral; Future    I will f/u wtih MRI    Patient Instructions   -Narcotic medications can be addicting and therefore are used for short term pain control only.  They are not intended for long-term use.    -Take them only for severe pain as needed.    -Never mix with alcohol.    -Do not drive after taking this medication.    -No refills without an office visit. No replacements will be given.    -Keep these medications kept in a safe location.  You are responsible for them.  -Do not give them to anyone else.  They are prescribed to you only.   If you have [employer] access here are some tips for appropriate use of [employer]:      If you are having a medical emergency, call 911.       [employer] messages are intended for quick communication and do not replace the need for visits that allow for two-way conversations. Visits are used for clarification, documentation, review, medical decision making, education and answering questions resulting in the best and safest care for you.    A new concern not brought up at a recent visit will  require a separate in person or virtual visit and will not be addressed through a [employer] message.    Acceptable uses of MyChart messages would include simple questions or clarification questions about your last visit or After Visit Summary (AVS).       If you need me to complete any forms or letters, adjust, modify or prescribe new prescriptions, assess and respond to new, worsening, or unimproved symptoms, or need a follow up action not discussed in your After Visit Summary (AVS), this will require you to schedule a visit with a provider (in person, video, E-visit, urgent care, emergency room) to receive the best and safe care.    If you are unsure of which type of visit is appropriate for your particular concern, this would be appropriate to send as a [employer] question.       Medical messages you send become part of your official medical record and should contain only medically related content.       Lab results and X-Ray results will appear in your results as soon as they are available on your MyChart-- even before I see them. Myself (or a covering provider if I am out of office), will review and provide specific comments for your results on your MyChart within 3-7 business days.  It is not necessary to separately message a provider before this time. We will try and contact you by phone for more urgent results.    -If you do not have an [employer] account, test/radiology results will arrive by mail within 1-2 weeks.       E-visits or messages are not reviewed over the weekend.      If you have a billing concern or questions, please call billing at [phone].       The longitudinal plan of care for the diagnosis(es)/condition(s) as documented were addressed during this visit. Due to the added complexity in care, I will continue to support Blanca in the subsequent management and with ongoing continuity of care.      Subjective   Blanca is a 40 year old, presenting for the following health issues:  Leg Pain         "6/2/2025     2:20 PM   Additional Questions   Roomed by Pt Completed E-check in via [email or web address]     Leg Pain    History of Present Illness       Reason for visit:  Leg pain   He is taking medications regularly.      Per triage note: \"used to have leg pain on the right, now is on the left. He says he was diagnosed with sciatica on the right side in the past and he thinks this is the problem again but on the left.\"      Patient's last mri copy from 2023:  IMPRESSION:   1. New large superiorly extending right central disc extrusion at L5-S1 with severe spinal canal stenosis and effacement of the right lateral recess with impingement of the descending right cauda equina nerve roots.  2. Other increased degenerative findings as above.    Report per radiology.    I ordered and independently reviewed the imaging study above; the results were discussed with the patient.    Assessment:   Right lumbar radiculopathy with compression of cauda equina nerves.    Plan:   Educated the patient regarding his condition and management. After a discussion, given the cauda equina involvement, I did page Neurosurgery and spoke with Tuyet Zavala PA-C who was in agreement with conservative management. We will set him up for a TFESI targeted right S1 and I will have him follow up in Spine a week after that injection. If he were to develop any sort of bowel or bladder symptoms prior to that, he should let us know immediately. The patient verbalized understanding.     Scribe Disclosure:  I, Brandy Singletary, am serving as a scribe to document services personally performed by Nick Goodrich MD at this visit, based upon the provider's statements to me. All documentation has been reviewed by the aforementioned provider prior to being entered into the official medical record.     Portions of this medical record were completed by a scribe. UPON MY REVIEW AND AUTHENTICATION BY ELECTRONIC SIGNATURE, this confirms (a) I performed the applicable " clinical services, and (b) the record is accurate.     Nick Goodrich MD         Patient had injection 2023 and pain improved but now it is back just the other side per patient. Wondering about another injection. Last mri was over 2 years ago.   Specific injury?--- NO  Onset--- 7 days  Worsening symptoms?--- same  Location of pain--- left back into left leg until ankle, not in foot.   Positions that make it worse--- sitting, standing, anything better? --- no  Red flags such as fevers, loss of bowel or bladder continence, saddle numbness, or cancer history?  --- NO  Radiation of pain? --- YES  Leg weakness?--- NO  Numbness or tingling?--- NO  Medications tried---- ibuprofen helps but when runs out pain comes back again    History of back problems or surgery--- no    [place] in past year.       Objective           Vitals:  No vitals were obtained today due to virtual visit.    Physical Exam   GENERAL: alert and no distress  EYES: Eyes grossly normal to inspection.  No discharge or erythema, or obvious scleral/conjunctival abnormalities.  RESP: No audible wheeze, cough, or visible cyanosis.    SKIN: Visible skin clear. No significant rash, abnormal pigmentation or lesions.  NEURO: Cranial nerves grossly intact.  Mentation and speech appropriate for age.  PSYCH: Appropriate affect, tone, and pace of words        Video-Visit Details    Type of service:  Video Visit   Originating Location (pt. Location): Home    Distant Location (provider location):  On-site  Platform used for Video Visit: Deric  Signed Electronically by: Lana Peck PA-C

## 2025-06-02 NOTE — TELEPHONE ENCOUNTER
Please triage phone appt just made for my 230, it may be something he needs to go to urgent care for. Lana Peck PA-C

## 2025-06-03 ENCOUNTER — PATIENT OUTREACH (OUTPATIENT)
Dept: CARE COORDINATION | Facility: CLINIC | Age: 40
End: 2025-06-03
Payer: COMMERCIAL

## 2025-06-04 ENCOUNTER — ANCILLARY PROCEDURE (OUTPATIENT)
Dept: MRI IMAGING | Facility: CLINIC | Age: 40
End: 2025-06-04
Attending: PHYSICIAN ASSISTANT
Payer: COMMERCIAL

## 2025-06-04 DIAGNOSIS — M54.16 LUMBAR RADICULOPATHY: ICD-10-CM

## 2025-06-04 PROCEDURE — 72148 MRI LUMBAR SPINE W/O DYE: CPT | Mod: TC | Performed by: RADIOLOGY

## 2025-06-05 ENCOUNTER — RESULTS FOLLOW-UP (OUTPATIENT)
Dept: FAMILY MEDICINE | Facility: CLINIC | Age: 40
End: 2025-06-05

## 2025-06-05 NOTE — TELEPHONE ENCOUNTER
SPINE PATIENTS - NEW PROTOCOL PREVISIT     RECORDS RECEVEIVED FROM: Referred by Lana Peck   REASON FOR VISIT: spinal stenosis of lumbar region   PROVIDER: Brodie   DATE OF APPT: 06/12/2025     NOTES (FOR ALL VISITS) STATUS DETAILS   OFFICE NOTE from referring provider  Referral and notes in chart   OFFICE NOTE from other specialist     DISCHARGE SUMMARY from hospital     DISCHARGE REPORT from ER     OPERATIVE REPORT     EMG REPORT     Injection     Physical therapy       IMAGING  (FOR ALL VISITS) STATUS DETAILS   MRI (HEAD, NECK, SPINE) internal MRI Lumbar 06/04/2025   XRAY (SPINE) *NEUROSURGERY*     CT (HEAD, NECK, SPINE)          Does patient have C2C?  Year last updated Action     YES   []      Please update at appointment if outdated more than 5 years       NO     [x]   N/A   Please complete C2C at appointment

## 2025-06-09 ENCOUNTER — PATIENT OUTREACH (OUTPATIENT)
Dept: CARE COORDINATION | Facility: CLINIC | Age: 40
End: 2025-06-09
Payer: COMMERCIAL

## 2025-06-12 ENCOUNTER — PRE VISIT (OUTPATIENT)
Dept: NEUROSURGERY | Facility: CLINIC | Age: 40
End: 2025-06-12

## 2025-06-16 ENCOUNTER — OFFICE VISIT (OUTPATIENT)
Dept: PHYSICAL MEDICINE AND REHAB | Facility: CLINIC | Age: 40
End: 2025-06-16
Attending: PHYSICIAN ASSISTANT
Payer: COMMERCIAL

## 2025-06-16 VITALS
HEIGHT: 72 IN | BODY MASS INDEX: 26.14 KG/M2 | WEIGHT: 193 LBS | SYSTOLIC BLOOD PRESSURE: 155 MMHG | HEART RATE: 129 BPM | DIASTOLIC BLOOD PRESSURE: 104 MMHG | RESPIRATION RATE: 16 BRPM | OXYGEN SATURATION: 96 %

## 2025-06-16 DIAGNOSIS — M48.062 SPINAL STENOSIS OF LUMBAR REGION WITH NEUROGENIC CLAUDICATION: ICD-10-CM

## 2025-06-16 DIAGNOSIS — M54.16 LUMBAR RADICULOPATHY: ICD-10-CM

## 2025-06-16 DIAGNOSIS — R29.898 LEFT LEG WEAKNESS: Primary | ICD-10-CM

## 2025-06-16 PROCEDURE — 3077F SYST BP >= 140 MM HG: CPT | Performed by: NURSE PRACTITIONER

## 2025-06-16 PROCEDURE — 99204 OFFICE O/P NEW MOD 45 MIN: CPT | Performed by: NURSE PRACTITIONER

## 2025-06-16 PROCEDURE — 1125F AMNT PAIN NOTED PAIN PRSNT: CPT | Performed by: NURSE PRACTITIONER

## 2025-06-16 PROCEDURE — 3080F DIAST BP >= 90 MM HG: CPT | Performed by: NURSE PRACTITIONER

## 2025-06-16 RX ORDER — HYDROCODONE BITARTRATE AND ACETAMINOPHEN 5; 325 MG/1; MG/1
1-2 TABLET ORAL EVERY 4 HOURS PRN
Qty: 10 TABLET | Refills: 0 | Status: SHIPPED | OUTPATIENT
Start: 2025-06-16

## 2025-06-16 RX ORDER — NAPROXEN 500 MG/1
500 TABLET ORAL 2 TIMES DAILY WITH MEALS
Qty: 10 TABLET | Refills: 0 | Status: SHIPPED | OUTPATIENT
Start: 2025-06-16

## 2025-06-16 ASSESSMENT — PAIN SCALES - GENERAL: PAINLEVEL_OUTOF10: SEVERE PAIN (7)

## 2025-06-16 NOTE — LETTER
"6/16/2025       RE: Blanca Zee  [address]  [place] [address]     Dear Colleague,    Thank you for referring your patient, Blanca Zee, to the [facility] [place] at [facility]. Please see a copy of my visit note below.    Today's Date: 6/16/2025     Patient seen at the request of Lana Peck for an opinion and evaluation of Spinal stenosis of lumbar region with neurogenic claudication.      HISTORY OF PRESENT ILLNESS:  Blanca Zee is a 40 year old male who presents with a chief complaint of pain.      He was seen today in the clinic with  assistance.     He reports a work-related lifting injury in 2009.  He says that he was diagnosed with a disc herniation.  He underwent 2 steroid injections and the pain resolved.  He had recurrent pain in 2022.  There was a MRI of the lumbar spine done at [facility] 5/10/2023 which demonstrated L5-S1 severe spinal canal stenosis.  He states that at the time there was a plan to have another steroid injection, but says that due to miscommunication with scheduling he did not have the injection done.  He says that he took medication (?Norco) and the pain improved.    Today, he reports recurrent pain.  The pain started about 4 weeks ago without any known injury.  This episode of pain has been different because the pain used to affect the RLE, and now the LLE is affected.      Today, he describes  -pain radiating from the left hip to left posterolateral lower leg, stops before the ankle  -limping gait    He states that Norco has been controlling the pain, but he has recurrent pain as soon as the Norco \"wears off\".    Aggravating factors include: standing, exercise  Relieving factors include: sitting, medication    Today, he rates the pain 7/10.  Patient states sleep is affected.    He denies falls, bowel or bladder incontinence, " or saddle anesthesia.       PRIOR INJURIES/TREATMENT:   Ice/Heat: None  Brace: None  Physical Therapy:   No recent PT       - Current Pain Medications -   Norco 5-325 mg, 1-2 tablets  Tylenol PRN      - Prior/Trialed Pain Medications -   Gabapentin 100 mg TID  Cyclobenzaprine      Prior Procedures:  Date    Procedure   Improvement (%)  2010 or 2011 Epidural Steroid injection ([facility])              Prior Related Surgery: none     Other (acupuncture, OMT, CMM, TENS, DME, etc.): none    Specialists Seen - (with most recent, available notes and clinic visits reviewed)   1. Sports medicine / [facility] 2023  2. [facility]    IMAGING - reviewed   MR LUMBAR SPINE W/O CONTRAST  LOCATION: [facility]  DATE: 6/4/2025     FINDINGS:  5 nonrib-bearing lumbar vertebral bodies.     Alignment: Straightening of the normal lumbar lordosis.  Vertebrae: Normal vertebral body heights. No discernible acute fracture.  Marrow signal: Type II Modic changes at L4-L5 and L5-S1.     Paraspinal soft tissue: Within normal limits.     Intrathecal contents: Conus medullaris terminates appropriately at L1. Normal morphology of the cauda equina. No discernible intrathecal mass or epidural fluid collection.     T12-L1: Normal disc. Normal facets. No spinal canal stenosis. No foraminal stenosis.     L1-L2: Normal disc. Normal facets. No spinal canal stenosis. No foraminal stenosis.     L2-L3: Normal disc. Normal facets. No spinal canal stenosis. No foraminal stenosis.     L3-L4: Normal disc. Mild facet arthropathy. No spinal canal stenosis. No foraminal stenosis.     L4-L5: Left central extrusion which measures 13 x 10 mm in the sagittal plane. Osteophytic ridging. Mild facet arthropathy. Moderate spinal canal stenosis with compression of the left lateral recess. No foraminal stenosis.     L5-S1: Right central protrusion with osteophytic ridging. Mild facet arthropathy. Compression of the right lateral recess  without central spinal canal stenosis. No foraminal stenosis                                                                   IMPRESSION:  1.  Degenerative findings throughout the lumbar spine with multilevel discogenic endplate changes.  2.  L4-L5 left central extrusion which compresses the left lateral recess (descending left L5 nerve root) and causes moderate spinal canal stenosis.  3.  L5-S1 right central protrusion which compresses the right lateral recess (descending right S1 nerve root).    Review Of Systems:  I am responding to those symptoms which are directly relevant to the specific indication for my consultation. I recommend that the patient follow up with their primary or referring provider to pursue any other symptoms which may be of concern.       Medical History:  He  has a past medical history of Diabetes (H), High cholesterol, and Hypertension.     He  has a past surgical history that includes Excise lesion conjunctival (Right, 11/13/2023).    Family History  His family history is not on file.     Social History:  Work: , sitting most of the day, no lifting involved  Current living situation: Lives with room mate. Performs ADLs/IADLs independently.  He  reports that he has never smoked. He has never been exposed to tobacco smoke. He has never used smokeless tobacco. He reports that he does not currently use alcohol. He reports that he does not currently use drugs.        Current Medications:   He has a current medication list which includes the following prescription(s): alcohol swab, atorvastatin, blood glucose, blood glucose calibration, blood glucose monitoring, cyclobenzaprine, gabapentin, glipizide, glipizide, hydrocodone-acetaminophen, lisinopril, meloxicam, sitagliptin-metformin, thin, and vitamin d2.     Allergies:   No Known Allergies    PHYSICAL EXAMINATION:  BP (!) 155/104 (BP Location: Left arm, Patient Position: Sitting)   Pulse (!) 129   Resp 16   Ht 1.829 m (6')   Wt 87.5  kg (193 lb)   SpO2 96%   BMI 26.18 kg/m     --CONSTITUTIONAL: Vital signs as above. No acute distress. The patient is well nourished and well groomed.  --PSYCHIATRIC: The patient is awake, alert, oriented to person, place, time and answering questions appropriately with clear speech. Appropriate mood and affect   --SKIN:  Posterior torso is clean, dry, intact without rashes.   --RESPIRATORY: Normal rhythm and effort. No abnormal accessory muscle breathing patterns noted.   --GROSS MOTOR: Easily arises from a seated position.  Standing at the counter for stability, he is able to balance on his heels and toes.  Standing at the counter for stability, is able to stand on one leg and perform 5 reps lifting onto toes on both sides but has significant difficulty on the left side.  --STANDING EXAMINATION: Antalgic gait.  Limping.  Normal lumbar lordosis noted.  Lateral shift.   --LOWER EXTREMITY MOTOR TESTING:  Plantar flexion left 5/5, right 5/5   Dorsiflexion left 4+/5, right 5/5   Great toe MTP extension left 4+/5, right 5/5  Knee flexion left 5/5, right 5/5  Knee extension left 5/5, right 5/5   Hip flexion left 5/5, right 5/5  --MUSCULOSKELETAL: Lumbar spine inspection reveals no evidence of deformity. Range of motion is limited in lumbar flexion, extension, lateral rotation and elicits pain. No tenderness to palpation lumbar spine. Straight leg raise positive on left, negative on the right.  Facet loading maneuvers are positive bilaterally .  --SACROILIAC JOINT: No pain with palpation of SI joint.   --NEUROLOGIC: 2/4 patellar and achilles reflexes bilaterally.  Sensation to light touch is intact in the bilateral L4, L5, and S1 dermatomes. No clonus.    --VASCULAR:  Warm lower limbs bilaterally. There is no pitting edema of the bilateral lower extremities.       ASSESSMENT:  Blanca Zee is a pleasant 40 year old male who presents with left lower extremity pain and weakness including left-sided mild foot drop and  EHL weakness    # Lumbar spinal canal stenosis: Moderate on L4-5 with left lateral recess stenosis, L5-S1 right lateral recess stenosis  # Lumbar spondylosis    Complicating comorbities include:  # Type 2 DM with Hgb A1C 10.4% on 4/28/2025        PLAN:  -MRI of the lumbar spine was reviewed in detail with the patient today, with the spinal canal stenosis/lateral recess stenosis at the L4-5 level likely causing the pain and weakness he describes  -Add referral for PT   -DM is poorly controlled- last A1c was 10.4.  We reviewed that to be eligible for an epidural steroid injection the hemoglobin A1c would need to be 8.5 or lower.  Will recheck the hemoglobin A1c after the visit since he has made DM medication changes in the last 2 months  -He declined a trial of gabapentin  -Add naproxen, 500 mg, BID x5 days to be taken with food  -Refilled Norco, 10 tablets, 0 refills, and reviewed that we do not manage opioid pain medications long-term in this clinic.   - Given the severe pain and left lower extremity weakness a referral was added for spine surgery consultation  - Red flags reviewed in detail with the patient today  - RTC TBD, pending labs as above      Ready to learn, no apparent learning barriers.  Education provided on treatment plan according to patient's preferred learning style.  Patient verbalizes understanding.   __________________________________  Ofe Bullard NP  Physical Medicine & Rehabilitation        52 minutes spent by me on the date of the encounter doing chart review, history and exam, documentation and further activities per the note       Again, thank you for allowing me to participate in the care of your patient.      Sincerely,    BRIANNA Forman CNP

## 2025-06-16 NOTE — PROGRESS NOTES
"Today's Date: 6/16/2025     Patient seen at the request of Lana Peck for an opinion and evaluation of Spinal stenosis of lumbar region with neurogenic claudication.      HISTORY OF PRESENT ILLNESS:  Blanca Zee is a 40 year old male who presents with a chief complaint of pain.      He was seen today in the clinic with  assistance.     He reports a work-related lifting injury in 2009.  He says that he was diagnosed with a disc herniation.  He underwent 2 steroid injections and the pain resolved.  He had recurrent pain in 2022.  There was a MRI of the lumbar spine done at [facility] 5/10/2023 which demonstrated L5-S1 severe spinal canal stenosis.  He states that at the time there was a plan to have another steroid injection, but says that due to miscommunication with scheduling he did not have the injection done.  He says that he took medication (?Norco) and the pain improved.    Today, he reports recurrent pain.  The pain started about 4 weeks ago without any known injury.  This episode of pain has been different because the pain used to affect the RLE, and now the LLE is affected.      Today, he describes  -pain radiating from the left hip to left posterolateral lower leg, stops before the ankle  -limping gait    He states that Norco has been controlling the pain, but he has recurrent pain as soon as the Norco \"wears off\".    Aggravating factors include: standing, exercise  Relieving factors include: sitting, medication    Today, he rates the pain 7/10.  Patient states sleep is affected.    He denies falls, bowel or bladder incontinence, or saddle anesthesia.       PRIOR INJURIES/TREATMENT:   Ice/Heat: None  Brace: None  Physical Therapy:   No recent PT       - Current Pain Medications -   Norco 5-325 mg, 1-2 tablets  Tylenol PRN      - Prior/Trialed Pain Medications -   Gabapentin 100 mg TID  Cyclobenzaprine      Prior Procedures:  Date    Procedure   Improvement (%)  2010 or " 2011 Epidural Steroid injection ([facility])              Prior Related Surgery: none     Other (acupuncture, OMT, CMM, TENS, DME, etc.): none    Specialists Seen - (with most recent, available notes and clinic visits reviewed)   1. Sports medicine / [facility] 2023  2. [facility]    IMAGING - reviewed   MR LUMBAR SPINE W/O CONTRAST  LOCATION: [facility]  DATE: 6/4/2025     FINDINGS:  5 nonrib-bearing lumbar vertebral bodies.     Alignment: Straightening of the normal lumbar lordosis.  Vertebrae: Normal vertebral body heights. No discernible acute fracture.  Marrow signal: Type II Modic changes at L4-L5 and L5-S1.     Paraspinal soft tissue: Within normal limits.     Intrathecal contents: Conus medullaris terminates appropriately at L1. Normal morphology of the cauda equina. No discernible intrathecal mass or epidural fluid collection.     T12-L1: Normal disc. Normal facets. No spinal canal stenosis. No foraminal stenosis.     L1-L2: Normal disc. Normal facets. No spinal canal stenosis. No foraminal stenosis.     L2-L3: Normal disc. Normal facets. No spinal canal stenosis. No foraminal stenosis.     L3-L4: Normal disc. Mild facet arthropathy. No spinal canal stenosis. No foraminal stenosis.     L4-L5: Left central extrusion which measures 13 x 10 mm in the sagittal plane. Osteophytic ridging. Mild facet arthropathy. Moderate spinal canal stenosis with compression of the left lateral recess. No foraminal stenosis.     L5-S1: Right central protrusion with osteophytic ridging. Mild facet arthropathy. Compression of the right lateral recess without central spinal canal stenosis. No foraminal stenosis                                                                   IMPRESSION:  1.  Degenerative findings throughout the lumbar spine with multilevel discogenic endplate changes.  2.  L4-L5 left central extrusion which compresses the left lateral recess (descending left L5 nerve root)  and causes moderate spinal canal stenosis.  3.  L5-S1 right central protrusion which compresses the right lateral recess (descending right S1 nerve root).    Review Of Systems:  I am responding to those symptoms which are directly relevant to the specific indication for my consultation. I recommend that the patient follow up with their primary or referring provider to pursue any other symptoms which may be of concern.       Medical History:  He  has a past medical history of Diabetes (H), High cholesterol, and Hypertension.     He  has a past surgical history that includes Excise lesion conjunctival (Right, 11/13/2023).    Family History  His family history is not on file.     Social History:  Work: , sitting most of the day, no lifting involved  Current living situation: Lives with room mate. Performs ADLs/IADLs independently.  He  reports that he has never smoked. He has never been exposed to tobacco smoke. He has never used smokeless tobacco. He reports that he does not currently use alcohol. He reports that he does not currently use drugs.        Current Medications:   He has a current medication list which includes the following prescription(s): alcohol swab, atorvastatin, blood glucose, blood glucose calibration, blood glucose monitoring, cyclobenzaprine, gabapentin, glipizide, glipizide, hydrocodone-acetaminophen, lisinopril, meloxicam, sitagliptin-metformin, thin, and vitamin d2.     Allergies:   No Known Allergies    PHYSICAL EXAMINATION:  BP (!) 155/104 (BP Location: Left arm, Patient Position: Sitting)   Pulse (!) 129   Resp 16   Ht 1.829 m (6')   Wt 87.5 kg (193 lb)   SpO2 96%   BMI 26.18 kg/m     --CONSTITUTIONAL: Vital signs as above. No acute distress. The patient is well nourished and well groomed.  --PSYCHIATRIC: The patient is awake, alert, oriented to person, place, time and answering questions appropriately with clear speech. Appropriate mood and affect   --SKIN:  Posterior torso is  clean, dry, intact without rashes.   --RESPIRATORY: Normal rhythm and effort. No abnormal accessory muscle breathing patterns noted.   --GROSS MOTOR: Easily arises from a seated position.  Standing at the counter for stability, he is able to balance on his heels and toes.  Standing at the counter for stability, is able to stand on one leg and perform 5 reps lifting onto toes on both sides but has significant difficulty on the left side.  --STANDING EXAMINATION: Antalgic gait.  Limping.  Normal lumbar lordosis noted.  Lateral shift.   --LOWER EXTREMITY MOTOR TESTING:  Plantar flexion left 5/5, right 5/5   Dorsiflexion left 4+/5, right 5/5   Great toe MTP extension left 4+/5, right 5/5  Knee flexion left 5/5, right 5/5  Knee extension left 5/5, right 5/5   Hip flexion left 5/5, right 5/5  --MUSCULOSKELETAL: Lumbar spine inspection reveals no evidence of deformity. Range of motion is limited in lumbar flexion, extension, lateral rotation and elicits pain. No tenderness to palpation lumbar spine. Straight leg raise positive on left, negative on the right.  Facet loading maneuvers are positive bilaterally .  --SACROILIAC JOINT: No pain with palpation of SI joint.   --NEUROLOGIC: 2/4 patellar and achilles reflexes bilaterally.  Sensation to light touch is intact in the bilateral L4, L5, and S1 dermatomes. No clonus.    --VASCULAR:  Warm lower limbs bilaterally. There is no pitting edema of the bilateral lower extremities.       ASSESSMENT:  Blanca Zee is a pleasant 40 year old male who presents with left lower extremity pain and weakness including left-sided mild foot drop and EHL weakness    # Lumbar spinal canal stenosis: Moderate on L4-5 with left lateral recess stenosis, L5-S1 right lateral recess stenosis  # Lumbar spondylosis    Complicating comorbities include:  # Type 2 DM with Hgb A1C 10.4% on 4/28/2025        PLAN:  -MRI of the lumbar spine was reviewed in detail with the patient today, with the spinal  canal stenosis/lateral recess stenosis at the L4-5 level likely causing the pain and weakness he describes  -Add referral for PT   -DM is poorly controlled- last A1c was 10.4.  We reviewed that to be eligible for an epidural steroid injection the hemoglobin A1c would need to be 8.5 or lower.  Will recheck the hemoglobin A1c after the visit since he has made DM medication changes in the last 2 months  -He declined a trial of gabapentin  -Add naproxen, 500 mg, BID x5 days to be taken with food  -Refilled Norco, 10 tablets, 0 refills, and reviewed that we do not manage opioid pain medications long-term in this clinic.   - Given the severe pain and left lower extremity weakness a referral was added for spine surgery consultation  - Red flags reviewed in detail with the patient today  - RTC TBD, pending labs as above      Ready to learn, no apparent learning barriers.  Education provided on treatment plan according to patient's preferred learning style.  Patient verbalizes understanding.   __________________________________  Ofe Bullard NP  Physical Medicine & Rehabilitation        52 minutes spent by me on the date of the encounter doing chart review, history and exam, documentation and further activities per the note

## 2025-06-16 NOTE — PATIENT INSTRUCTIONS
It was a pleasure seeing you in the clinic today.  As discussed, we made the following updates to your plan of care:    An order for physical therapy was added today. Physical therapy schedulers should call you in the next few days to schedule an appointment. You may also call [phone] to arrange an appointment at any of the [facility] outpatient physical therapy locations.  It will be very important for you to do the physical therapy exercises on a regular basis to decrease your pain and prevent future flares of pain.     Continue to work with your primary doctor on managing your diabetes. To be eligible for a steroid injection, the hemoglobin A1C needs to be 8.5 or lower.      An order for labs was added to recheck the hemoglobin A1c. The lab is located on the first floor [facility]. Our staff can help you schedule a same-day appointment today, or you can call [phone] (Option 1) to schedule an appointment for a later time.      Please take naproxen two times per day for 5 days, with food. This is a NSAID medication to help with pain and inflammation. Take the medication with a full glass of water and food.   You may take 600 mg Ibuprofen three times a day for 5-7 days. Should be taken with food. Long term use increases risk of stomach irritation, bleeding, or ulcer formation. Return to emergency department if blood in stools, dark black tarry stools, etc.   Do NOT take this as the same time as other NSAID medications (for example: Advil, Ibuprofen, Aleve, or Naproxen).  NSAIDs have risks if taken long-term, including: gastrointestinal irritation, kidney dysfunction, and cardiovascular effects.    Hydrocodone with Tylenol was refilled today (refills = 0).  We do not manage narcotic pain medications long-term in this clinic.. This is an opioid pain medication. Please lock this medication up when you are not taking it. Do not share this medication with other people. Do not  increase the dose without permission from your physician. Do not drink alcohol while you take this medication as this can lead to death. Do not take other pain medications or sedating medications without approval from your physician or this can also lead to death. Please call if you have any questions on how to take this medication.       Spine surgery referral was added.  You will receive a call to schedule the appointment.    -If you develop new/severe pain, numbness, or tingling, lose control of bowel or bladder, numbness between the legs/groin area, falls, or new weakness, go to the emergency department right away          Thank you,  Ofe Bullard NP  Physical Medicine and Rehabilitation, Medical Spine  [facility] clinic Phone: [phone]  [facility] Fax: [phone]

## 2025-06-19 DIAGNOSIS — E11.9 TYPE 2 DIABETES MELLITUS WITHOUT COMPLICATION, WITHOUT LONG-TERM CURRENT USE OF INSULIN (H): ICD-10-CM

## 2025-06-19 RX ORDER — LISINOPRIL 5 MG/1
5 TABLET ORAL DAILY
Qty: 90 TABLET | Refills: 0 | Status: SHIPPED | OUTPATIENT
Start: 2025-06-19

## 2025-06-20 NOTE — TELEPHONE ENCOUNTER
Refill done.    Please assist pt to schedule for annual physical on 10/22/25 , Future refills after physical.    Thank you,  Melissa Lam MD on 6/19/2025

## 2025-07-06 ENCOUNTER — OFFICE VISIT (OUTPATIENT)
Dept: URGENT CARE | Facility: URGENT CARE | Age: 40
End: 2025-07-06
Payer: COMMERCIAL

## 2025-07-06 VITALS
OXYGEN SATURATION: 97 % | WEIGHT: 181.2 LBS | SYSTOLIC BLOOD PRESSURE: 142 MMHG | DIASTOLIC BLOOD PRESSURE: 89 MMHG | TEMPERATURE: 98.1 F | BODY MASS INDEX: 24.54 KG/M2 | HEIGHT: 72 IN | HEART RATE: 116 BPM | RESPIRATION RATE: 21 BRPM

## 2025-07-06 DIAGNOSIS — R11.2 NAUSEA AND VOMITING, UNSPECIFIED VOMITING TYPE: ICD-10-CM

## 2025-07-06 DIAGNOSIS — R10.11 RUQ ABDOMINAL PAIN: Primary | ICD-10-CM

## 2025-07-06 PROCEDURE — 99214 OFFICE O/P EST MOD 30 MIN: CPT | Performed by: FAMILY MEDICINE

## 2025-07-06 PROCEDURE — 3079F DIAST BP 80-89 MM HG: CPT | Performed by: FAMILY MEDICINE

## 2025-07-06 PROCEDURE — 3077F SYST BP >= 140 MM HG: CPT | Performed by: FAMILY MEDICINE

## 2025-07-06 NOTE — PROGRESS NOTES
Urgent Care Clinic Visit    Chief Complaint   Patient presents with    Abdominal Pain     Patient presents with complain of ongoing vomiting, low appetite, nausea, bloated, RUQ pain, and fatigue for the last week.                7/6/2025    11:16 AM   Additional Questions   Roomed by Charan Plasencia MA   Accompanied by Self

## 2025-07-06 NOTE — PROGRESS NOTES
"SUBJECTIVE  HPI: Blanca Zee is a 40 year old male  who presents with the CC of abdominal/pelvic pain.   Pain is located in the RUQ area, with radiation to None    The pain is characterized as \"pain\".    Pain has been present for 1 week(s) and is fluctuating.     EXACERBATING FACTORS: food.   RELIEVING FACTORS: NEGATIVE.    ASSOCIATED SX: nausea and vomiting.     Past Medical History:   Diagnosis Date    Diabetes (H)     High cholesterol     Hypertension      No Known Allergies  Social History     Tobacco Use    Smoking status: Never     Passive exposure: Never    Smokeless tobacco: Never   Substance Use Topics    Alcohol use: Not Currently       ROS:CONSTITUTIONAL:NEGATIVE for fever, chills, change in weight    EXAMINATION:  BP (!) 142/89   Pulse 116   Temp 98.1  F (36.7  C) (Oral)   Resp 21   Ht 1.816 m (5' 11.5\")   Wt 82.2 kg (181 lb 3.2 oz)   SpO2 97%   BMI 24.92 kg/m  GENERAL APPEARANCE: healthy, alert and no distress  ABDOMEN: tenderness moderate RUQ      ICD-10-CM    1. RUQ abdominal pain  R10.11       2. Nausea and vomiting, unspecified vomiting type  R11.2         Pt will go through ED for w/up RUQ abd pain     "

## 2025-08-03 ENCOUNTER — HEALTH MAINTENANCE LETTER (OUTPATIENT)
Age: 40
End: 2025-08-03

## 2025-08-13 ENCOUNTER — VIRTUAL VISIT (OUTPATIENT)
Dept: FAMILY MEDICINE | Facility: CLINIC | Age: 40
End: 2025-08-13
Payer: COMMERCIAL

## 2025-08-13 DIAGNOSIS — E78.5 DYSLIPIDEMIA: ICD-10-CM

## 2025-08-13 DIAGNOSIS — E11.9 TYPE 2 DIABETES MELLITUS WITHOUT RETINOPATHY (H): Primary | ICD-10-CM

## 2025-08-13 DIAGNOSIS — I10 ESSENTIAL HYPERTENSION: ICD-10-CM

## 2025-08-13 PROCEDURE — 98006 SYNCH AUDIO-VIDEO EST MOD 30: CPT | Performed by: INTERNAL MEDICINE

## 2025-08-13 PROCEDURE — 1126F AMNT PAIN NOTED NONE PRSNT: CPT | Mod: 95 | Performed by: INTERNAL MEDICINE

## 2025-08-13 RX ORDER — GLIPIZIDE 2.5 MG/1
2.5 TABLET, EXTENDED RELEASE ORAL DAILY
Qty: 90 TABLET | Refills: 0 | Status: SHIPPED | OUTPATIENT
Start: 2025-08-13

## 2025-08-31 DIAGNOSIS — E11.9 TYPE 2 DIABETES MELLITUS WITHOUT COMPLICATION, WITHOUT LONG-TERM CURRENT USE OF INSULIN (H): ICD-10-CM

## 2025-09-02 RX ORDER — SITAGLIPTIN AND METFORMIN HYDROCHLORIDE 1000; 50 MG/1; MG/1
1 TABLET, FILM COATED ORAL 2 TIMES DAILY WITH MEALS
Qty: 180 TABLET | Refills: 0 | Status: SHIPPED | OUTPATIENT
Start: 2025-09-02

## (undated) DEVICE — ESU CORD BIPOLAR GREEN 10-4000

## (undated) DEVICE — GLOVE BIOGEL PI ULTRATOUCH G SZ 7.5 42175

## (undated) DEVICE — EYE TIP BIPOLAR 18GA ERASER 221250

## (undated) DEVICE — EYE DRSG PAD OVAL

## (undated) DEVICE — PEN MARKING SKIN

## (undated) DEVICE — LINEN TOWEL PACK X5 5464

## (undated) DEVICE — PACK CATARACT CUSTOM ASC SEY15CPUMC

## (undated) DEVICE — EYE SHIELD PLASTIC

## (undated) DEVICE — SOL WATER IRRIG 500ML BOTTLE 2F7113

## (undated) DEVICE — EYE PREP BETADINE 5% SOLUTION 30ML 0065-0411-30

## (undated) DEVICE — EYE SPONGE SPEAR WECK CEL 0008685

## (undated) DEVICE — SU SILK 6-0 S-14DA 18" 1780G

## (undated) DEVICE — ADH TISSEEL FIBRIN SEALANT 2ML

## (undated) RX ORDER — ACETAMINOPHEN 325 MG/1
TABLET ORAL
Status: DISPENSED
Start: 2023-11-13

## (undated) RX ORDER — PROPOFOL 10 MG/ML
INJECTION, EMULSION INTRAVENOUS
Status: DISPENSED
Start: 2023-11-13